# Patient Record
Sex: MALE | Race: WHITE | Employment: UNEMPLOYED | URBAN - NONMETROPOLITAN AREA
[De-identification: names, ages, dates, MRNs, and addresses within clinical notes are randomized per-mention and may not be internally consistent; named-entity substitution may affect disease eponyms.]

---

## 2017-02-10 ENCOUNTER — TELEPHONE (OUTPATIENT)
Dept: FAMILY MEDICINE CLINIC | Age: 47
End: 2017-02-10

## 2017-04-17 ENCOUNTER — TELEPHONE (OUTPATIENT)
Dept: FAMILY MEDICINE CLINIC | Age: 47
End: 2017-04-17

## 2017-04-18 ENCOUNTER — TELEPHONE (OUTPATIENT)
Dept: FAMILY MEDICINE CLINIC | Age: 47
End: 2017-04-18

## 2017-04-21 ENCOUNTER — TELEPHONE (OUTPATIENT)
Dept: FAMILY MEDICINE CLINIC | Age: 47
End: 2017-04-21

## 2017-04-24 ENCOUNTER — OFFICE VISIT (OUTPATIENT)
Dept: FAMILY MEDICINE CLINIC | Age: 47
End: 2017-04-24

## 2017-04-24 VITALS
SYSTOLIC BLOOD PRESSURE: 106 MMHG | HEART RATE: 105 BPM | DIASTOLIC BLOOD PRESSURE: 70 MMHG | OXYGEN SATURATION: 98 % | TEMPERATURE: 98.8 F

## 2017-04-24 DIAGNOSIS — K21.9 CHRONIC GERD: Primary | ICD-10-CM

## 2017-04-24 DIAGNOSIS — R60.9 DEPENDENT EDEMA: ICD-10-CM

## 2017-04-24 PROBLEM — G83.10 MONOPLEGIA OF LOWER EXTREMITY (HCC): Status: ACTIVE | Noted: 2017-02-04

## 2017-04-24 PROBLEM — N31.9 NEUROGENIC BLADDER: Status: ACTIVE | Noted: 2017-02-09

## 2017-04-24 PROBLEM — S27.1XXA HEMOTHORAX, TRAUMATIC: Status: ACTIVE | Noted: 2017-02-04

## 2017-04-24 PROBLEM — S42.109A CLOSED FRACTURE OF SCAPULA: Status: ACTIVE | Noted: 2017-02-04

## 2017-04-24 PROBLEM — S27.0XXA PNEUMOTHORAX, CLOSED, TRAUMATIC: Status: ACTIVE | Noted: 2017-02-04

## 2017-04-24 PROBLEM — S42.023A CLAVICULAR FRACTURE, CLOSED, SHAFT: Status: ACTIVE | Noted: 2017-02-04

## 2017-04-24 PROBLEM — S22.49XA CLOSED FRACTURE OF MULTIPLE RIBS: Status: ACTIVE | Noted: 2017-02-04

## 2017-04-24 PROBLEM — S24.101A: Status: ACTIVE | Noted: 2017-02-09
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
## 2017-04-24 PROBLEM — K59.2 NEUROGENIC BOWEL: Status: ACTIVE | Noted: 2017-02-09

## 2017-04-24 PROBLEM — I60.9 HEMORRHAGE INTO SUBARACHNOID SPACE OF NEURAXIS (HCC): Status: ACTIVE | Noted: 2017-03-08

## 2017-04-24 PROBLEM — S22.009A FRACTURE OF THORACIC SPINE (HCC): Status: ACTIVE | Noted: 2017-02-04

## 2017-04-24 LAB — D DIMER: 942 NG/ML DDU (ref 0–229)

## 2017-04-24 PROCEDURE — 99203 OFFICE O/P NEW LOW 30 MIN: CPT | Performed by: FAMILY MEDICINE

## 2017-04-24 PROCEDURE — 36415 COLL VENOUS BLD VENIPUNCTURE: CPT | Performed by: FAMILY MEDICINE

## 2017-04-24 RX ORDER — ESCITALOPRAM OXALATE 10 MG/1
10 TABLET ORAL DAILY
COMMUNITY
End: 2017-06-14 | Stop reason: SDUPTHER

## 2017-04-24 RX ORDER — PANTOPRAZOLE SODIUM 40 MG/1
40 TABLET, DELAYED RELEASE ORAL DAILY
Qty: 30 TABLET | Refills: 5 | Status: SHIPPED | OUTPATIENT
Start: 2017-04-24 | End: 2017-06-19

## 2017-04-24 RX ORDER — BISACODYL 10 MG
10 SUPPOSITORY, RECTAL RECTAL
COMMUNITY
Start: 2017-02-13 | End: 2017-07-17 | Stop reason: SDUPTHER

## 2017-04-24 RX ORDER — OXYCODONE HYDROCHLORIDE 5 MG/1
5-10 TABLET ORAL
COMMUNITY
Start: 2017-02-13 | End: 2018-06-26 | Stop reason: ALTCHOICE

## 2017-04-24 RX ORDER — POLYETHYLENE GLYCOL 3350 17 G/17G
17 POWDER, FOR SOLUTION ORAL
COMMUNITY
Start: 2017-02-13 | End: 2017-05-10 | Stop reason: ALTCHOICE

## 2017-04-24 ASSESSMENT — PATIENT HEALTH QUESTIONNAIRE - PHQ9
1. LITTLE INTEREST OR PLEASURE IN DOING THINGS: 1
2. FEELING DOWN, DEPRESSED OR HOPELESS: 1
SUM OF ALL RESPONSES TO PHQ9 QUESTIONS 1 & 2: 2
3. TROUBLE FALLING OR STAYING ASLEEP: 0
SUM OF ALL RESPONSES TO PHQ QUESTIONS 1-9: 2

## 2017-04-26 ENCOUNTER — TELEPHONE (OUTPATIENT)
Dept: FAMILY MEDICINE CLINIC | Age: 47
End: 2017-04-26

## 2017-04-27 ENCOUNTER — TELEPHONE (OUTPATIENT)
Dept: FAMILY MEDICINE CLINIC | Age: 47
End: 2017-04-27

## 2017-04-27 ENCOUNTER — HOSPITAL ENCOUNTER (OUTPATIENT)
Dept: ULTRASOUND IMAGING | Age: 47
Discharge: HOME | End: 2017-04-27
Attending: FAMILY MEDICINE | Admitting: FAMILY MEDICINE

## 2017-04-27 DIAGNOSIS — R60.9 DEPENDENT EDEMA: ICD-10-CM

## 2017-05-01 ENCOUNTER — TELEPHONE (OUTPATIENT)
Dept: FAMILY MEDICINE CLINIC | Age: 47
End: 2017-05-01

## 2017-05-10 ENCOUNTER — OFFICE VISIT (OUTPATIENT)
Dept: FAMILY MEDICINE CLINIC | Age: 47
End: 2017-05-10

## 2017-05-10 VITALS — HEART RATE: 122 BPM | SYSTOLIC BLOOD PRESSURE: 116 MMHG | OXYGEN SATURATION: 98 % | DIASTOLIC BLOOD PRESSURE: 68 MMHG

## 2017-05-10 DIAGNOSIS — I82.401 ACUTE DEEP VEIN THROMBOSIS (DVT) OF RIGHT LOWER EXTREMITY, UNSPECIFIED VEIN (HCC): Primary | ICD-10-CM

## 2017-05-10 PROCEDURE — 99213 OFFICE O/P EST LOW 20 MIN: CPT | Performed by: FAMILY MEDICINE

## 2017-05-10 RX ORDER — DIAZEPAM 2 MG/1
2 TABLET ORAL NIGHTLY PRN
COMMUNITY
End: 2017-06-14 | Stop reason: SDUPTHER

## 2017-05-10 RX ORDER — OXYBUTYNIN CHLORIDE 5 MG/1
5 TABLET ORAL 3 TIMES DAILY
COMMUNITY
End: 2017-06-19 | Stop reason: SDUPTHER

## 2017-05-10 RX ORDER — SENNA AND DOCUSATE SODIUM 50; 8.6 MG/1; MG/1
1 TABLET, FILM COATED ORAL DAILY
COMMUNITY
End: 2017-06-14 | Stop reason: SDUPTHER

## 2017-05-10 ASSESSMENT — ENCOUNTER SYMPTOMS
CHEST TIGHTNESS: 0
ANAL BLEEDING: 0
SHORTNESS OF BREATH: 0
BLOOD IN STOOL: 0

## 2017-05-16 ENCOUNTER — TELEPHONE (OUTPATIENT)
Dept: FAMILY MEDICINE CLINIC | Age: 47
End: 2017-05-16

## 2017-05-16 DIAGNOSIS — G82.20 PARAPLEGIA FOLLOWING SPINAL CORD INJURY (HCC): ICD-10-CM

## 2017-05-16 DIAGNOSIS — S24.101A T1-T6 LEVEL SPINAL CORD INJURY (HCC): Primary | ICD-10-CM

## 2017-05-17 ENCOUNTER — TELEPHONE (OUTPATIENT)
Dept: FAMILY MEDICINE CLINIC | Age: 47
End: 2017-05-17

## 2017-05-22 ENCOUNTER — TELEPHONE (OUTPATIENT)
Dept: FAMILY MEDICINE CLINIC | Age: 47
End: 2017-05-22

## 2017-06-05 ENCOUNTER — TELEPHONE (OUTPATIENT)
Dept: FAMILY MEDICINE CLINIC | Age: 47
End: 2017-06-05

## 2017-06-14 RX ORDER — ESCITALOPRAM OXALATE 10 MG/1
TABLET ORAL
Qty: 30 TABLET | Refills: 0 | Status: SHIPPED | OUTPATIENT
Start: 2017-06-14 | End: 2017-06-19 | Stop reason: SDUPTHER

## 2017-06-14 RX ORDER — DIAZEPAM 2 MG/1
TABLET ORAL
Qty: 30 TABLET | Refills: 0 | Status: SHIPPED | OUTPATIENT
Start: 2017-06-14 | End: 2017-06-19 | Stop reason: SDUPTHER

## 2017-06-14 RX ORDER — ASPIRIN 81 MG
TABLET, DELAYED RELEASE (ENTERIC COATED) ORAL
Qty: 30 TABLET | Refills: 0 | Status: SHIPPED | OUTPATIENT
Start: 2017-06-14 | End: 2017-06-19 | Stop reason: SDUPTHER

## 2017-06-14 RX ORDER — BACLOFEN 20 MG/1
TABLET ORAL
Qty: 30 TABLET | Refills: 0 | Status: SHIPPED | OUTPATIENT
Start: 2017-06-14 | End: 2017-06-19 | Stop reason: SDUPTHER

## 2017-06-15 ENCOUNTER — TELEPHONE (OUTPATIENT)
Dept: FAMILY MEDICINE CLINIC | Age: 47
End: 2017-06-15

## 2017-06-15 DIAGNOSIS — S24.101A SPINAL CORD INJURY, T1-T6 (HCC): Primary | ICD-10-CM

## 2017-06-19 ENCOUNTER — OFFICE VISIT (OUTPATIENT)
Dept: FAMILY MEDICINE CLINIC | Age: 47
End: 2017-06-19

## 2017-06-19 VITALS — HEART RATE: 90 BPM | DIASTOLIC BLOOD PRESSURE: 68 MMHG | OXYGEN SATURATION: 98 % | SYSTOLIC BLOOD PRESSURE: 138 MMHG

## 2017-06-19 DIAGNOSIS — S24.101A SPINAL CORD INJURY, T1-T6 (HCC): Primary | ICD-10-CM

## 2017-06-19 DIAGNOSIS — I82.401 ACUTE DEEP VEIN THROMBOSIS (DVT) OF RIGHT LOWER EXTREMITY, UNSPECIFIED VEIN (HCC): ICD-10-CM

## 2017-06-19 PROCEDURE — 99213 OFFICE O/P EST LOW 20 MIN: CPT | Performed by: FAMILY MEDICINE

## 2017-06-19 RX ORDER — ESCITALOPRAM OXALATE 10 MG/1
TABLET ORAL
Qty: 30 TABLET | Refills: 0 | Status: CANCELLED | OUTPATIENT
Start: 2017-06-19

## 2017-06-19 RX ORDER — BACLOFEN 10 MG/1
TABLET ORAL
Qty: 60 TABLET | Refills: 0 | Status: SHIPPED | OUTPATIENT
Start: 2017-06-19 | End: 2017-07-17 | Stop reason: SDUPTHER

## 2017-06-19 RX ORDER — BACLOFEN 20 MG/1
TABLET ORAL
Qty: 30 TABLET | Refills: 0 | Status: CANCELLED | OUTPATIENT
Start: 2017-06-19

## 2017-06-19 RX ORDER — ASPIRIN 81 MG
TABLET, DELAYED RELEASE (ENTERIC COATED) ORAL
Qty: 30 TABLET | Refills: 0 | Status: SHIPPED | OUTPATIENT
Start: 2017-06-19 | End: 2017-08-09 | Stop reason: SDUPTHER

## 2017-06-19 RX ORDER — OXYBUTYNIN CHLORIDE 5 MG/1
TABLET ORAL
Qty: 90 TABLET | Refills: 0 | Status: CANCELLED | OUTPATIENT
Start: 2017-06-19

## 2017-06-19 RX ORDER — DIAZEPAM 2 MG/1
TABLET ORAL
Qty: 30 TABLET | Refills: 5 | Status: SHIPPED | OUTPATIENT
Start: 2017-06-19 | End: 2017-12-19 | Stop reason: SDUPTHER

## 2017-06-19 RX ORDER — BACLOFEN 10 MG/1
10 TABLET ORAL 2 TIMES DAILY
COMMUNITY
End: 2017-09-18

## 2017-06-19 RX ORDER — ESCITALOPRAM OXALATE 10 MG/1
TABLET ORAL
Qty: 30 TABLET | Refills: 5 | Status: SHIPPED | OUTPATIENT
Start: 2017-06-19 | End: 2017-12-19 | Stop reason: SDUPTHER

## 2017-06-19 RX ORDER — BACLOFEN 20 MG/1
TABLET ORAL
Qty: 30 TABLET | Refills: 5 | Status: SHIPPED | OUTPATIENT
Start: 2017-06-19 | End: 2017-10-11 | Stop reason: SDUPTHER

## 2017-06-19 RX ORDER — OXYBUTYNIN CHLORIDE 5 MG/1
5 TABLET ORAL 3 TIMES DAILY
Qty: 90 TABLET | Refills: 5 | Status: SHIPPED | OUTPATIENT
Start: 2017-06-19 | End: 2017-11-24 | Stop reason: SDUPTHER

## 2017-06-19 ASSESSMENT — ENCOUNTER SYMPTOMS
RESPIRATORY NEGATIVE: 1
BLOOD IN STOOL: 0
ANAL BLEEDING: 0

## 2017-08-03 ENCOUNTER — TELEPHONE (OUTPATIENT)
Dept: FAMILY MEDICINE CLINIC | Age: 47
End: 2017-08-03

## 2017-08-03 DIAGNOSIS — N31.9 NEUROGENIC BLADDER: ICD-10-CM

## 2017-08-03 DIAGNOSIS — G82.20 PARALYSIS OF BOTH LOWER LIMBS (HCC): ICD-10-CM

## 2017-08-03 DIAGNOSIS — S24.101A T1-T6 LEVEL SPINAL CORD INJURY (HCC): Primary | ICD-10-CM

## 2017-08-03 DIAGNOSIS — K59.2 NEUROGENIC BOWEL: ICD-10-CM

## 2017-08-04 ENCOUNTER — TELEPHONE (OUTPATIENT)
Dept: FAMILY MEDICINE CLINIC | Age: 47
End: 2017-08-04

## 2017-08-04 RX ORDER — POLYETHYLENE GLYCOL 3350 17 G/17G
17 POWDER, FOR SOLUTION ORAL DAILY
Qty: 510 G | Refills: 0 | Status: SHIPPED | OUTPATIENT
Start: 2017-08-04 | End: 2017-08-31 | Stop reason: SDUPTHER

## 2017-08-04 RX ORDER — DIAPER,BRIEF,ADULT, DISPOSABLE
1 EACH MISCELLANEOUS SEE ADMIN INSTRUCTIONS
Qty: 100 EACH | Refills: 11 | Status: SHIPPED | OUTPATIENT
Start: 2017-08-04

## 2017-08-09 RX ORDER — DOCUSATE SODIUM AND SENNOSIDES 8.6; 5 MG/1; MG/1
TABLET, FILM COATED ORAL
Qty: 30 TABLET | Refills: 0 | Status: SHIPPED | OUTPATIENT
Start: 2017-08-09 | End: 2017-08-31 | Stop reason: SDUPTHER

## 2017-08-11 ENCOUNTER — OFFICE VISIT (OUTPATIENT)
Dept: FAMILY MEDICINE CLINIC | Age: 47
End: 2017-08-11

## 2017-08-11 VITALS
TEMPERATURE: 98.5 F | HEART RATE: 93 BPM | DIASTOLIC BLOOD PRESSURE: 76 MMHG | SYSTOLIC BLOOD PRESSURE: 124 MMHG | OXYGEN SATURATION: 98 %

## 2017-08-11 DIAGNOSIS — S24.101A T1-T6 LEVEL SPINAL CORD INJURY (HCC): Primary | ICD-10-CM

## 2017-08-11 DIAGNOSIS — I82.401 ACUTE DEEP VEIN THROMBOSIS (DVT) OF RIGHT LOWER EXTREMITY, UNSPECIFIED VEIN (HCC): ICD-10-CM

## 2017-08-11 PROCEDURE — 99213 OFFICE O/P EST LOW 20 MIN: CPT | Performed by: FAMILY MEDICINE

## 2017-08-11 ASSESSMENT — ENCOUNTER SYMPTOMS
CONSTIPATION: 1
RESPIRATORY NEGATIVE: 1
ANAL BLEEDING: 0
BLOOD IN STOOL: 0

## 2017-08-16 ENCOUNTER — TELEPHONE (OUTPATIENT)
Dept: FAMILY MEDICINE CLINIC | Age: 47
End: 2017-08-16

## 2017-08-21 ENCOUNTER — HOSPITAL ENCOUNTER (OUTPATIENT)
Dept: ULTRASOUND IMAGING | Age: 47
Discharge: HOME | End: 2017-08-21

## 2017-08-21 DIAGNOSIS — N31.9 NEUROMUSCULAR DYSFUNCTION OF BLADDER: ICD-10-CM

## 2017-08-21 DIAGNOSIS — N31.9 NEUROGENIC BLADDER, NOS: ICD-10-CM

## 2017-08-23 ENCOUNTER — TELEPHONE (OUTPATIENT)
Dept: FAMILY MEDICINE CLINIC | Age: 47
End: 2017-08-23

## 2017-08-31 ENCOUNTER — TELEPHONE (OUTPATIENT)
Dept: FAMILY MEDICINE CLINIC | Age: 47
End: 2017-08-31

## 2017-09-01 RX ORDER — POLYETHYLENE GLYCOL 3350 17 G/17G
POWDER, FOR SOLUTION ORAL
Qty: 527 G | Refills: 5 | Status: SHIPPED | OUTPATIENT
Start: 2017-09-01 | End: 2018-03-20 | Stop reason: SDUPTHER

## 2017-09-01 RX ORDER — DOCUSATE SODIUM AND SENNOSIDES 8.6; 5 MG/1; MG/1
TABLET, FILM COATED ORAL
Qty: 30 TABLET | Refills: 5 | Status: SHIPPED | OUTPATIENT
Start: 2017-09-01 | End: 2018-01-26 | Stop reason: SDUPTHER

## 2017-09-20 ENCOUNTER — TELEPHONE (OUTPATIENT)
Dept: FAMILY MEDICINE CLINIC | Age: 47
End: 2017-09-20

## 2017-09-20 ENCOUNTER — TELEPHONE (OUTPATIENT)
Dept: PULMONOLOGY | Age: 47
End: 2017-09-20

## 2017-09-20 DIAGNOSIS — R93.89 ABNORMAL CT SCAN: Primary | ICD-10-CM

## 2017-09-21 ENCOUNTER — TELEPHONE (OUTPATIENT)
Dept: PULMONOLOGY | Age: 47
End: 2017-09-21

## 2017-09-25 ENCOUNTER — TELEPHONE (OUTPATIENT)
Dept: FAMILY MEDICINE CLINIC | Age: 47
End: 2017-09-25

## 2017-09-28 ENCOUNTER — TELEPHONE (OUTPATIENT)
Dept: FAMILY MEDICINE CLINIC | Age: 47
End: 2017-09-28

## 2017-09-28 NOTE — TELEPHONE ENCOUNTER
Father of patient says that he contacted the insurance company regarding physical therapy.  They told him that Dr. Maria Isabel Martinez would need to order the physical therapy

## 2017-09-29 NOTE — TELEPHONE ENCOUNTER
I got the form from online. Will complete it and fax it over along with additional information to support it.

## 2017-10-03 NOTE — TELEPHONE ENCOUNTER
Checked fax and nothing has come through yet from [employer].

## 2017-10-09 ENCOUNTER — TELEPHONE (OUTPATIENT)
Dept: FAMILY MEDICINE CLINIC | Age: 47
End: 2017-10-09

## 2017-10-09 DIAGNOSIS — S22.008D OTHER CLOSED FRACTURE OF THORACIC VERTEBRA WITH ROUTINE HEALING, UNSPECIFIED THORACIC VERTEBRAL LEVEL, SUBSEQUENT ENCOUNTER: ICD-10-CM

## 2017-10-09 DIAGNOSIS — S27.1XXA: ICD-10-CM

## 2017-10-09 DIAGNOSIS — S24.101A T1-T6 LEVEL SPINAL CORD INJURY (HCC): Primary | ICD-10-CM

## 2017-10-09 DIAGNOSIS — S27.0XXA: ICD-10-CM

## 2017-10-09 NOTE — TELEPHONE ENCOUNTER
Noted - placed order for continuous PT Aquatic Therapy  And faxed to Violet Lockwood at [address]

## 2017-10-11 ENCOUNTER — OFFICE VISIT (OUTPATIENT)
Dept: FAMILY MEDICINE CLINIC | Age: 47
End: 2017-10-11

## 2017-10-11 VITALS
TEMPERATURE: 99.1 F | DIASTOLIC BLOOD PRESSURE: 82 MMHG | SYSTOLIC BLOOD PRESSURE: 112 MMHG | HEART RATE: 88 BPM | RESPIRATION RATE: 20 BRPM | OXYGEN SATURATION: 97 %

## 2017-10-11 DIAGNOSIS — Z86.718 HX OF BLOOD CLOTS: ICD-10-CM

## 2017-10-11 DIAGNOSIS — I26.99 OTHER PULMONARY EMBOLISM WITHOUT ACUTE COR PULMONALE (HCC): Primary | ICD-10-CM

## 2017-10-11 PROCEDURE — 99213 OFFICE O/P EST LOW 20 MIN: CPT | Performed by: FAMILY MEDICINE

## 2017-10-11 ASSESSMENT — ENCOUNTER SYMPTOMS: RESPIRATORY NEGATIVE: 1

## 2017-10-11 NOTE — PATIENT INSTRUCTIONS
Watch for bleeding complicationdiscussed in detail    Call this office after CT of chest done in 3 months. Further follow-up with Dr. Bryn Mejia not necessary. Certainly at your discretion if so desired.

## 2017-10-11 NOTE — PROGRESS NOTES
Subjective:      Patient ID: Errol Cee is a 52 y.o. male. HPI  Chief Complaint   Patient presents with    Follow-Up from Hospital-'s not fit criteria for transition care visit. Reasonno phone call from nurse coordinator. Patient admitted with acute pulmonary embolus bilateral nodular infiltrate in the lung which requires follow-up CT in 3 months. Patient instructed that just call us after he gets the CT. This examiner sees no reason for continued pulmonary consultation. Discussed need for anticoagulation for the duration. That he can still get blood clots. And to look for bleedingmelena, hematochezia or epistaxis ecchymoses hematuria. Patientthis could be fatal and we have no reversible drug. [facility] admitted 9/18/17 for blood clot     Other     Pt rolled back in wheel chair in waiting room today      Chief complaint and present illness: 26-year-old white male paraplegic presents in follow-up to pulmonary emboli 9 2017. Chart from that hospitalization reviewed in detail. Scans reviewed in detail. As noted above. Review of Systems   Respiratory: Negative. Cardiovascular: Negative. Neurological: Negative. Hematological: Negative. Objective:   Physical Exam   Constitutional: He is oriented to person, place, and time. He appears well-developed and well-nourished. No distress. Cardiovascular: Normal rate and regular rhythm. Pulmonary/Chest: Effort normal and breath sounds normal.   Musculoskeletal: He exhibits no edema or tenderness. Neurological: He is alert and oriented to person, place, and time. Skin: Skin is warm. No bruising and no ecchymosis noted. No pallor. Nursing note and vitals reviewed. /82 (Site: Left Arm, Position: Sitting, Cuff Size: Large Adult)   Pulse 88   Temp 99.1 °F (37.3 °C) (Oral)   Resp 20   SpO2 97%     Assessment:      Yvon Gupta was seen today for follow-up from hospital and other.     Diagnoses and all orders for

## 2017-10-31 DIAGNOSIS — I82.401 ACUTE DEEP VEIN THROMBOSIS (DVT) OF RIGHT LOWER EXTREMITY, UNSPECIFIED VEIN (HCC): ICD-10-CM

## 2017-10-31 RX ORDER — RIVAROXABAN 20 MG/1
20 TABLET, FILM COATED ORAL DAILY
Qty: 30 TABLET | Refills: 3 | Status: SHIPPED | OUTPATIENT
Start: 2017-10-31 | End: 2018-01-26 | Stop reason: SDUPTHER

## 2017-11-20 NOTE — TELEPHONE ENCOUNTER
Patients father is calling for more  Catheters KIT,  Needing more. Ne[facility] is it okay to redorder?

## 2017-12-26 ENCOUNTER — TELEPHONE (OUTPATIENT)
Dept: FAMILY MEDICINE CLINIC | Age: 47
End: 2017-12-26

## 2017-12-26 DIAGNOSIS — R91.1 PULMONARY NODULE SEEN ON IMAGING STUDY: Primary | ICD-10-CM

## 2017-12-26 DIAGNOSIS — S24.101A SPINAL CORD INJURY, T1-T6 (HCC): ICD-10-CM

## 2017-12-26 RX ORDER — BACLOFEN 20 MG/1
TABLET ORAL
Qty: 30 TABLET | Refills: 5 | Status: SHIPPED | OUTPATIENT
Start: 2017-12-26 | End: 2018-05-14 | Stop reason: SDUPTHER

## 2017-12-26 NOTE — TELEPHONE ENCOUNTER
I set the ct scan up for patient because his father was having difficulty with the scheduling dept  It is scheduled for Jan 2 for 11 am he needs to [facility] 10:30 [facility] the [facility]

## 2017-12-28 ENCOUNTER — TELEPHONE (OUTPATIENT)
Dept: PULMONOLOGY | Age: 47
End: 2017-12-28

## 2017-12-28 NOTE — TELEPHONE ENCOUNTER
Patient cancelled appointment on 1/2/18 with Dr. Neo Irving for CT/hospital f/u. Reason: pt insurance out of network, so pt is going to have CT chest and f/u with Dr. Cyn Calles. Patient did not reschedule appointment. Appointment rescheduled for .      Hospital consult 9/20/17:    ASSESSMENT:  · Acute hypoxemic respiratory failure   · Acute pulmonary emboli  · Urinary Tract Infection   · Elevated troponin, favor secondary to acute RV overload     PLAN:  · Agree with lovenox SQ.  I now recommend transitioning to Xarelto, as patient previously did well with Lilli Copas lifelong anticoagulation per ACCP guidelines  · CTX D#3  · Supplemental oxygen  · Repeat CT CHEST in 3 months for abnormal CT CHEST, nodular infiltrate  · D/C planning

## 2018-01-02 ENCOUNTER — HOSPITAL ENCOUNTER (OUTPATIENT)
Dept: CT IMAGING | Facility: MEDICAL CENTER | Age: 48
Discharge: HOME | End: 2018-01-02
Attending: FAMILY MEDICINE | Admitting: FAMILY MEDICINE

## 2018-01-02 DIAGNOSIS — R91.1 SOLITARY PULMONARY NODULE: ICD-10-CM

## 2018-01-02 DIAGNOSIS — R91.1 PULMONARY NODULE SEEN ON IMAGING STUDY: ICD-10-CM

## 2018-01-17 ENCOUNTER — OFFICE VISIT (OUTPATIENT)
Dept: FAMILY MEDICINE CLINIC | Age: 48
End: 2018-01-17

## 2018-01-17 VITALS — SYSTOLIC BLOOD PRESSURE: 131 MMHG | DIASTOLIC BLOOD PRESSURE: 90 MMHG | HEART RATE: 102 BPM | OXYGEN SATURATION: 98 %

## 2018-01-17 DIAGNOSIS — Z79.01 ANTICOAGULANT LONG-TERM USE: Primary | ICD-10-CM

## 2018-01-17 DIAGNOSIS — S24.101A T1-T6 LEVEL SPINAL CORD INJURY (HCC): ICD-10-CM

## 2018-01-17 DIAGNOSIS — F32.A DEPRESSION, UNSPECIFIED DEPRESSION TYPE: ICD-10-CM

## 2018-01-17 DIAGNOSIS — I26.99 OTHER ACUTE PULMONARY EMBOLISM WITHOUT ACUTE COR PULMONALE (HCC): ICD-10-CM

## 2018-01-17 PROCEDURE — 1036F TOBACCO NON-USER: CPT | Performed by: FAMILY MEDICINE

## 2018-01-17 PROCEDURE — G8427 DOCREV CUR MEDS BY ELIG CLIN: HCPCS | Performed by: FAMILY MEDICINE

## 2018-01-17 PROCEDURE — G8484 FLU IMMUNIZE NO ADMIN: HCPCS | Performed by: FAMILY MEDICINE

## 2018-01-17 PROCEDURE — G8417 CALC BMI ABV UP PARAM F/U: HCPCS | Performed by: FAMILY MEDICINE

## 2018-01-17 PROCEDURE — 99214 OFFICE O/P EST MOD 30 MIN: CPT | Performed by: FAMILY MEDICINE

## 2018-01-24 DIAGNOSIS — S24.101A SPINAL CORD INJURY, T1-T6 (HCC): ICD-10-CM

## 2018-01-24 RX ORDER — DIAZEPAM 2 MG/1
TABLET ORAL
Qty: 30 TABLET | Refills: 5 | Status: SHIPPED | OUTPATIENT
Start: 2018-01-24 | End: 2018-07-09 | Stop reason: SDUPTHER

## 2018-01-24 RX ORDER — ESCITALOPRAM OXALATE 10 MG/1
TABLET ORAL
Qty: 30 TABLET | Refills: 5 | Status: SHIPPED | OUTPATIENT
Start: 2018-01-24 | End: 2018-05-14 | Stop reason: SDUPTHER

## 2018-01-24 NOTE — TELEPHONE ENCOUNTER
Refill Request for Lexapro and Valium     Last visit- 1/17/18    Told to follow up- 4 months    Pending appointment- 5/14/18    Additional Comments - OARRS RAN 1/4/18

## 2018-01-26 ENCOUNTER — TELEPHONE (OUTPATIENT)
Dept: FAMILY MEDICINE CLINIC | Age: 48
End: 2018-01-26

## 2018-01-26 DIAGNOSIS — I82.401 ACUTE DEEP VEIN THROMBOSIS (DVT) OF RIGHT LOWER EXTREMITY, UNSPECIFIED VEIN (HCC): ICD-10-CM

## 2018-01-26 NOTE — TELEPHONE ENCOUNTER
Refill Request for Xarelto, dok plus     Last visit- 1/17/18    Told to follow up- 4 months    Pending appointment- 5/14/18

## 2018-01-27 RX ORDER — DOCUSATE SODIUM AND SENNOSIDES 8.6; 5 MG/1; MG/1
TABLET, FILM COATED ORAL
Qty: 30 TABLET | Refills: 0 | Status: SHIPPED | OUTPATIENT
Start: 2018-01-27 | End: 2018-03-20 | Stop reason: SDUPTHER

## 2018-01-27 RX ORDER — RIVAROXABAN 20 MG/1
20 TABLET, FILM COATED ORAL DAILY
Qty: 30 TABLET | Refills: 0 | Status: SHIPPED | OUTPATIENT
Start: 2018-01-27 | End: 2018-03-20 | Stop reason: SDUPTHER

## 2018-02-22 ENCOUNTER — TELEPHONE (OUTPATIENT)
Dept: FAMILY MEDICINE CLINIC | Age: 48
End: 2018-02-22

## 2018-03-20 DIAGNOSIS — I82.401 ACUTE DEEP VEIN THROMBOSIS (DVT) OF RIGHT LOWER EXTREMITY, UNSPECIFIED VEIN (HCC): ICD-10-CM

## 2018-03-20 RX ORDER — DOCUSATE SODIUM AND SENNOSIDES 8.6; 5 MG/1; MG/1
TABLET, FILM COATED ORAL
Qty: 30 TABLET | Refills: 0 | Status: SHIPPED | OUTPATIENT
Start: 2018-03-20 | End: 2018-05-08 | Stop reason: SDUPTHER

## 2018-03-20 RX ORDER — RIVAROXABAN 20 MG/1
20 TABLET, FILM COATED ORAL DAILY
Qty: 30 TABLET | Refills: 0 | Status: SHIPPED | OUTPATIENT
Start: 2018-03-20 | End: 2018-04-20 | Stop reason: SDUPTHER

## 2018-03-20 RX ORDER — POLYETHYLENE GLYCOL 3350 17 G/17G
POWDER, FOR SOLUTION ORAL
Qty: 527 G | Refills: 0 | Status: SHIPPED | OUTPATIENT
Start: 2018-03-20 | End: 2021-12-16

## 2018-03-21 ENCOUNTER — TELEPHONE (OUTPATIENT)
Dept: FAMILY MEDICINE CLINIC | Age: 48
End: 2018-03-21

## 2018-03-21 DIAGNOSIS — S24.102S T5 SPINAL CORD INJURY, SEQUELA (HCC): ICD-10-CM

## 2018-03-21 DIAGNOSIS — G82.20 PARALYSIS OF BOTH LOWER LIMBS (HCC): Primary | ICD-10-CM

## 2018-03-21 DIAGNOSIS — K59.2 NEUROGENIC BOWEL: ICD-10-CM

## 2018-03-21 DIAGNOSIS — N31.9 NEUROGENIC BLADDER: ICD-10-CM

## 2018-03-21 PROBLEM — S22.059A T6 VERTEBRAL FRACTURE (HCC): Status: ACTIVE | Noted: 2017-02-04

## 2018-03-21 PROBLEM — S24.102A: Status: ACTIVE | Noted: 2017-02-09

## 2018-03-29 RX ORDER — CATHETER 12 FR
EACH MISCELLANEOUS
Qty: 1 EACH | Refills: 12 | Status: SHIPPED | OUTPATIENT
Start: 2018-03-29

## 2018-03-29 NOTE — TELEPHONE ENCOUNTER
Pt father calling needs an RX for lubrication for catheters. [employer] delivers the equipment. They deliver tomorrow, informed that Dr. Yohannes Jacob is out of office the rest of today and tomorrow.

## 2018-04-20 DIAGNOSIS — I82.401 ACUTE DEEP VEIN THROMBOSIS (DVT) OF RIGHT LOWER EXTREMITY, UNSPECIFIED VEIN (HCC): ICD-10-CM

## 2018-04-20 RX ORDER — RIVAROXABAN 20 MG/1
20 TABLET, FILM COATED ORAL DAILY
Qty: 30 TABLET | Refills: 1 | Status: SHIPPED | OUTPATIENT
Start: 2018-04-20 | End: 2018-05-14 | Stop reason: SDUPTHER

## 2018-05-08 RX ORDER — AMOXICILLIN 250 MG
CAPSULE ORAL
Qty: 30 TABLET | Refills: 1 | Status: SHIPPED | OUTPATIENT
Start: 2018-05-08 | End: 2018-06-06 | Stop reason: SDUPTHER

## 2018-05-14 ENCOUNTER — OFFICE VISIT (OUTPATIENT)
Dept: FAMILY MEDICINE CLINIC | Age: 48
End: 2018-05-14

## 2018-05-14 VITALS — HEART RATE: 92 BPM | OXYGEN SATURATION: 94 % | DIASTOLIC BLOOD PRESSURE: 74 MMHG | SYSTOLIC BLOOD PRESSURE: 115 MMHG

## 2018-05-14 DIAGNOSIS — S24.101A SPINAL CORD INJURY, T1-T6 (HCC): ICD-10-CM

## 2018-05-14 DIAGNOSIS — I82.401 ACUTE DEEP VEIN THROMBOSIS (DVT) OF RIGHT LOWER EXTREMITY, UNSPECIFIED VEIN (HCC): ICD-10-CM

## 2018-05-14 PROCEDURE — 99213 OFFICE O/P EST LOW 20 MIN: CPT | Performed by: FAMILY MEDICINE

## 2018-05-14 PROCEDURE — G8427 DOCREV CUR MEDS BY ELIG CLIN: HCPCS | Performed by: FAMILY MEDICINE

## 2018-05-14 PROCEDURE — 1036F TOBACCO NON-USER: CPT | Performed by: FAMILY MEDICINE

## 2018-05-14 PROCEDURE — G8417 CALC BMI ABV UP PARAM F/U: HCPCS | Performed by: FAMILY MEDICINE

## 2018-05-14 RX ORDER — DIAZEPAM 2 MG/1
2 TABLET ORAL NIGHTLY
COMMUNITY
Start: 2018-04-18 | End: 2019-01-04 | Stop reason: SDUPTHER

## 2018-05-14 RX ORDER — BACLOFEN 10 MG/1
TABLET ORAL
Qty: 60 TABLET | Refills: 11 | Status: SHIPPED | OUTPATIENT
Start: 2018-05-14 | End: 2018-11-05 | Stop reason: SDUPTHER

## 2018-05-14 RX ORDER — ESCITALOPRAM OXALATE 10 MG/1
TABLET ORAL
Qty: 30 TABLET | Refills: 5 | Status: SHIPPED | OUTPATIENT
Start: 2018-05-14 | End: 2018-11-05 | Stop reason: SDUPTHER

## 2018-05-14 RX ORDER — BACLOFEN 20 MG/1
TABLET ORAL
Qty: 30 TABLET | Refills: 11 | Status: SHIPPED | OUTPATIENT
Start: 2018-05-14 | End: 2018-11-05 | Stop reason: SDUPTHER

## 2018-05-14 ASSESSMENT — PATIENT HEALTH QUESTIONNAIRE - PHQ9
SUM OF ALL RESPONSES TO PHQ9 QUESTIONS 1 & 2: 1
2. FEELING DOWN, DEPRESSED OR HOPELESS: 1
1. LITTLE INTEREST OR PLEASURE IN DOING THINGS: 0
SUM OF ALL RESPONSES TO PHQ QUESTIONS 1-9: 1

## 2018-05-14 ASSESSMENT — ENCOUNTER SYMPTOMS: RESPIRATORY NEGATIVE: 1

## 2018-06-06 RX ORDER — AMOXICILLIN 250 MG
CAPSULE ORAL
Qty: 30 TABLET | Refills: 1 | Status: SHIPPED | OUTPATIENT
Start: 2018-06-06 | End: 2018-08-08 | Stop reason: SDUPTHER

## 2018-06-25 ENCOUNTER — TELEPHONE (OUTPATIENT)
Dept: FAMILY MEDICINE CLINIC | Age: 48
End: 2018-06-25

## 2018-06-25 DIAGNOSIS — T14.8XXA WOUND, OPEN: Primary | ICD-10-CM

## 2018-06-26 ENCOUNTER — HOSPITAL ENCOUNTER (OUTPATIENT)
Dept: WOUND CARE | Age: 48
Discharge: HOME | End: 2018-06-27
Attending: NURSE PRACTITIONER | Admitting: NURSE PRACTITIONER

## 2018-06-26 VITALS
DIASTOLIC BLOOD PRESSURE: 69 MMHG | TEMPERATURE: 98.5 F | WEIGHT: 243.9 LBS | RESPIRATION RATE: 20 BRPM | BODY MASS INDEX: 32.32 KG/M2 | SYSTOLIC BLOOD PRESSURE: 117 MMHG | HEIGHT: 73 IN | HEART RATE: 74 BPM

## 2018-06-26 DIAGNOSIS — L89.322 PRESSURE ULCER OF LEFT ISCHIUM, STAGE 2 (HCC): ICD-10-CM

## 2018-06-26 DIAGNOSIS — G82.20 PARAPLEGIA (HCC): ICD-10-CM

## 2018-06-26 RX ORDER — LIDOCAINE 40 MG/G
CREAM TOPICAL ONCE
Status: DISCONTINUED | OUTPATIENT
Start: 2018-06-26 | End: 2018-06-28 | Stop reason: HOSPADM

## 2018-06-26 RX ORDER — NYSTATIN 100000 U/G
CREAM TOPICAL
Qty: 30 G | Refills: 1 | Status: SHIPPED | OUTPATIENT
Start: 2018-06-26 | End: 2018-09-27 | Stop reason: ALTCHOICE

## 2018-06-27 PROBLEM — L89.322 PRESSURE ULCER OF LEFT ISCHIUM, STAGE 2 (HCC): Status: ACTIVE | Noted: 2018-06-27

## 2018-06-27 PROBLEM — G82.20 PARAPLEGIA (HCC): Status: ACTIVE | Noted: 2018-06-27

## 2018-06-27 PROCEDURE — 99202 OFFICE O/P NEW SF 15 MIN: CPT | Performed by: NURSE PRACTITIONER

## 2018-06-27 PROCEDURE — 97597 DBRDMT OPN WND 1ST 20 CM/<: CPT | Performed by: NURSE PRACTITIONER

## 2018-07-03 ENCOUNTER — HOSPITAL ENCOUNTER (OUTPATIENT)
Dept: WOUND CARE | Age: 48
Discharge: HOME | End: 2018-07-03
Attending: NURSE PRACTITIONER | Admitting: NURSE PRACTITIONER

## 2018-07-03 VITALS — TEMPERATURE: 98.4 F | DIASTOLIC BLOOD PRESSURE: 74 MMHG | RESPIRATION RATE: 20 BRPM | SYSTOLIC BLOOD PRESSURE: 136 MMHG

## 2018-07-03 DIAGNOSIS — G82.20 PARAPLEGIA (HCC): ICD-10-CM

## 2018-07-03 DIAGNOSIS — L89.322 PRESSURE ULCER OF LEFT ISCHIUM, STAGE 2 (HCC): ICD-10-CM

## 2018-07-03 PROCEDURE — 97597 DBRDMT OPN WND 1ST 20 CM/<: CPT | Performed by: NURSE PRACTITIONER

## 2018-07-03 RX ORDER — LIDOCAINE 40 MG/G
CREAM TOPICAL ONCE
Status: DISCONTINUED | OUTPATIENT
Start: 2018-07-03 | End: 2018-07-04 | Stop reason: HOSPADM

## 2018-07-09 DIAGNOSIS — S24.101A SPINAL CORD INJURY, T1-T6 (HCC): ICD-10-CM

## 2018-07-09 RX ORDER — DIAZEPAM 2 MG/1
TABLET ORAL
Qty: 30 TABLET | Refills: 5 | Status: SHIPPED | OUTPATIENT
Start: 2018-07-09 | End: 2018-08-08

## 2018-07-10 ENCOUNTER — HOSPITAL ENCOUNTER (OUTPATIENT)
Dept: WOUND CARE | Age: 48
Discharge: HOME | End: 2018-07-10
Attending: NURSE PRACTITIONER | Admitting: NURSE PRACTITIONER

## 2018-07-10 VITALS
HEART RATE: 97 BPM | WEIGHT: 244 LBS | RESPIRATION RATE: 20 BRPM | BODY MASS INDEX: 32.19 KG/M2 | TEMPERATURE: 98.3 F | SYSTOLIC BLOOD PRESSURE: 148 MMHG | DIASTOLIC BLOOD PRESSURE: 86 MMHG

## 2018-07-10 DIAGNOSIS — L89.322 PRESSURE ULCER OF LEFT ISCHIUM, STAGE 2 (HCC): ICD-10-CM

## 2018-07-10 DIAGNOSIS — G82.20 PARAPLEGIA (HCC): ICD-10-CM

## 2018-07-10 PROCEDURE — 99213 OFFICE O/P EST LOW 20 MIN: CPT | Performed by: NURSE PRACTITIONER

## 2018-07-11 NOTE — PROGRESS NOTES
[facility] Progress Note    Jeff Saldana     : 1970    DATE OF VISIT:  7/10/2018    Subjective:     Jeff Saldana is a 50 y.o. male who has a pressure ulcer to left ischium  Wound has resolved with strict offloading. He spends most of day in customized wheelchair he was given by a friend. Pt will need seat cushion for this chair and regularly offload q 1 -2 hrs. Has been educated. Pt denies fever, chills, malaise. Appetite good. Current complaint of pain in this ulcer? No.Pt can feel pressure of palpation only    His current medication list consists of BARDEX LUBRICATH CATHETER, Catheters, DEPEND REAL-FIT BRIEFS FOR MEN, Disposable Gloves, Disposable Liners, baclofen, bisacodyl, diazepam, escitalopram, nystatin, oxybutynin, polyethylene glycol, rivaroxaban, and senna-docusate. Allergies: Patient has no known allergies. Objective:     Vitals:    07/10/18 1447   BP: (!) 148/86   Pulse: 97   Resp: 20   Temp: 98.3 °F (36.8 °C)   TempSrc: Oral   Weight: 244 lb (110.7 kg)       General Appearance: alert and oriented to person, place and time, well developed and well-nourished, in no acute distress  Extremities: no cyanosis, cellulitis, no LE edema  Musculoskeletal: Paraplegia, minimal atrophy  Neurologic: no distal sensation     Wound to Right Ischium resolved  Photos also saved in electronic chart. Assessment:     Patient Active Problem List   Diagnosis Code    Hemothorax, traumatic S27. 1XXA    T6 vertebral fracture ([address] S22.059A    T5 spinal cord injury ([address] S24.102A    SAH [facility]arachnoid hemorrhage) ([facility]) I60.9    Pneumothorax, closed, traumatic S27. 0XXA    Paralysis of lower limb ([address] G83.10    Clavicular fracture, closed, shaft S42.023A    Closed fracture of scapula S42.109A    Fall W19. XXXA    Closed fracture of multiple ribs S22.49XA    Neurogenic bladder N31.9    Neurogenic bowel K59.2    Hx of blood clots Z86.718    Acute hypoxemic

## 2018-07-26 DIAGNOSIS — S24.101A SPINAL CORD INJURY, T1-T6 (HCC): ICD-10-CM

## 2018-07-26 RX ORDER — ESCITALOPRAM OXALATE 10 MG/1
TABLET ORAL
Qty: 30 TABLET | Refills: 0 | OUTPATIENT
Start: 2018-07-26

## 2018-07-26 NOTE — TELEPHONE ENCOUNTER
I spoke with Tracie at the Pharmacy and he said they did not receive the refill in May , so I gave him that refill on #30 with 5 refills

## 2018-08-08 RX ORDER — AMOXICILLIN 250 MG
CAPSULE ORAL
Qty: 30 TABLET | Refills: 1 | Status: SHIPPED | OUTPATIENT
Start: 2018-08-08 | End: 2018-10-09 | Stop reason: SDUPTHER
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
## 2018-09-27 ENCOUNTER — HOSPITAL ENCOUNTER (OUTPATIENT)
Dept: WOUND CARE | Age: 48
Discharge: HOME | End: 2018-09-27
Payer: COMMERCIAL

## 2018-09-27 VITALS
SYSTOLIC BLOOD PRESSURE: 143 MMHG | BODY MASS INDEX: 31.74 KG/M2 | WEIGHT: 239.5 LBS | DIASTOLIC BLOOD PRESSURE: 82 MMHG | HEART RATE: 88 BPM | TEMPERATURE: 98.5 F | HEIGHT: 73 IN | RESPIRATION RATE: 16 BRPM

## 2018-09-27 DIAGNOSIS — L89.322 STAGE II PRESSURE ULCER OF LEFT BUTTOCK (HCC): ICD-10-CM

## 2018-09-27 PROCEDURE — 99212 OFFICE O/P EST SF 10 MIN: CPT | Performed by: NURSE PRACTITIONER

## 2018-09-27 PROCEDURE — 99213 OFFICE O/P EST LOW 20 MIN: CPT

## 2018-09-27 RX ORDER — LIDOCAINE 40 MG/G
CREAM TOPICAL ONCE
Status: DISCONTINUED | OUTPATIENT
Start: 2018-09-27 | End: 2018-09-28 | Stop reason: HOSPADM

## 2018-09-27 NOTE — PLAN OF CARE
Problem: Pressure Ulcer:  Intervention: Infection risk assessment  No wound debridement today, continue offloading of wound, f/u x 1 week,MD orders/D/C instructions reviewed with patient, all questions answered; copy of instructions given to patient

## 2018-10-04 ENCOUNTER — HOSPITAL ENCOUNTER (OUTPATIENT)
Dept: WOUND CARE | Age: 48
Discharge: HOME | End: 2018-10-04
Payer: COMMERCIAL

## 2018-10-04 VITALS
TEMPERATURE: 98.5 F | SYSTOLIC BLOOD PRESSURE: 127 MMHG | HEART RATE: 71 BPM | DIASTOLIC BLOOD PRESSURE: 70 MMHG | HEIGHT: 73 IN | WEIGHT: 244.4 LBS | BODY MASS INDEX: 32.39 KG/M2 | RESPIRATION RATE: 16 BRPM

## 2018-10-04 DIAGNOSIS — L89.322 STAGE II PRESSURE ULCER OF LEFT BUTTOCK (HCC): ICD-10-CM

## 2018-10-04 PROCEDURE — 99213 OFFICE O/P EST LOW 20 MIN: CPT | Performed by: NURSE PRACTITIONER

## 2018-10-04 PROCEDURE — 99213 OFFICE O/P EST LOW 20 MIN: CPT

## 2018-10-04 RX ORDER — LIDOCAINE 40 MG/G
CREAM TOPICAL ONCE
Status: DISCONTINUED | OUTPATIENT
Start: 2018-10-04 | End: 2018-10-05 | Stop reason: HOSPADM

## 2018-10-09 DIAGNOSIS — S24.101A SPINAL CORD INJURY, T1-T6 (HCC): ICD-10-CM

## 2018-10-09 RX ORDER — OXYBUTYNIN CHLORIDE 5 MG/1
5 TABLET ORAL 3 TIMES DAILY
Qty: 90 TABLET | Refills: 0 | Status: SHIPPED | OUTPATIENT
Start: 2018-10-09 | End: 2018-11-05 | Stop reason: SDUPTHER

## 2018-10-09 RX ORDER — DOCUSATE SODIUM AND SENNOSIDES 8.6; 5 MG/1; MG/1
TABLET, FILM COATED ORAL
Qty: 30 TABLET | Refills: 0 | Status: SHIPPED | OUTPATIENT
Start: 2018-10-09 | End: 2018-12-04 | Stop reason: SDUPTHER

## 2018-10-11 ENCOUNTER — HOSPITAL ENCOUNTER (OUTPATIENT)
Dept: WOUND CARE | Age: 48
Discharge: HOME | End: 2018-10-11
Payer: COMMERCIAL

## 2018-10-11 VITALS
HEIGHT: 73 IN | RESPIRATION RATE: 16 BRPM | TEMPERATURE: 98.3 F | SYSTOLIC BLOOD PRESSURE: 142 MMHG | WEIGHT: 243 LBS | HEART RATE: 75 BPM | BODY MASS INDEX: 32.2 KG/M2 | DIASTOLIC BLOOD PRESSURE: 70 MMHG

## 2018-10-11 DIAGNOSIS — L89.322 STAGE II PRESSURE ULCER OF LEFT BUTTOCK (HCC): ICD-10-CM

## 2018-10-11 PROCEDURE — 99213 OFFICE O/P EST LOW 20 MIN: CPT

## 2018-10-11 PROCEDURE — 99213 OFFICE O/P EST LOW 20 MIN: CPT | Performed by: NURSE PRACTITIONER

## 2018-10-11 RX ORDER — LIDOCAINE 40 MG/G
CREAM TOPICAL ONCE
Status: DISCONTINUED | OUTPATIENT
Start: 2018-10-11 | End: 2018-10-12 | Stop reason: HOSPADM

## 2018-10-12 NOTE — PROGRESS NOTES
[facility] Progress Note    Rolanda Vu     : 1970    DATE OF VISIT:  10/11/2018    Subjective:     Rolanda Vu is a 50 y.o. male who has a Pressure ulcer Stage II  located on the L gluteal fold. Other significant symptoms or pertinent ulcer history:Pt's wound has improved. Family very involved in care. Brought in [facility] cushion and was deflated. Spent time educating family about keeping cushion inflated. Additional ulcer(s) noted? no.     Mr. Anuradha Arechiga has a past medical history of DVT of leg (deep venous thrombosis) ([facility] GERD (gastroesophageal reflux disease); Injury, crush, back; and Paraplegia ([facility] He has a past surgical history that includes back surgery and fracture surgery. His family history includes Coronary Art Dis in his maternal grandfather; Dementia in his maternal grandmother; No Known Problems in his father, mother, paternal grandfather, and sister; Tuberculosis in his paternal grandmother. Mr. Anuradha Arechiga reports that he has never smoked. He has never used smokeless tobacco. He reports that he does not drink alcohol or use drugs. His current medication list consists of BARDEX LUBRICATH CATHETER, Catheters, DEPEND REAL-FIT BRIEFS FOR MEN, Disposable Gloves, Disposable Liners, baclofen, bisacodyl, diazepam, escitalopram, oxybutynin, polyethylene glycol, rivaroxaban, and senna-docusate. Allergies: Patient has no known allergies. Pertinent items from the review of systems are discussed in the HPI; the remainder of the ROS was reviewed and is negative.      Objective:     Vitals:    10/11/18 1247   BP: (!) 142/70   Pulse: 75   Resp: 16   Temp: 98.3 °F (36.8 °C)   TempSrc: Oral   Weight: 243 lb (110.2 kg)   Height: 6' 1\" (1.854 m)       General Appearance: alert and oriented to person, place and time, well developed and well-nourished, in no acute distress  Christina-ulcer skin:  Intact, maceration  Ulcer(s):  clean, open, good granulation and

## 2018-10-18 ENCOUNTER — HOSPITAL ENCOUNTER (OUTPATIENT)
Dept: WOUND CARE | Age: 48
Discharge: HOME | End: 2018-10-18
Payer: COMMERCIAL

## 2018-10-18 VITALS
BODY MASS INDEX: 32.55 KG/M2 | DIASTOLIC BLOOD PRESSURE: 76 MMHG | RESPIRATION RATE: 16 BRPM | HEIGHT: 73 IN | HEART RATE: 78 BPM | SYSTOLIC BLOOD PRESSURE: 125 MMHG | WEIGHT: 245.6 LBS | TEMPERATURE: 97.6 F

## 2018-10-18 DIAGNOSIS — L89.322 STAGE II PRESSURE ULCER OF LEFT BUTTOCK (HCC): ICD-10-CM

## 2018-10-18 PROCEDURE — 99213 OFFICE O/P EST LOW 20 MIN: CPT

## 2018-10-18 PROCEDURE — 99212 OFFICE O/P EST SF 10 MIN: CPT | Performed by: NURSE PRACTITIONER

## 2018-10-18 RX ORDER — LIDOCAINE 40 MG/G
CREAM TOPICAL ONCE
Status: DISCONTINUED | OUTPATIENT
Start: 2018-10-18 | End: 2018-10-19 | Stop reason: HOSPADM

## 2018-10-18 NOTE — PLAN OF CARE
Problem: Pressure Ulcer:  Goal: Will show no infection signs and symptoms  Will show no infection signs and symptoms   Wound is healed today,aftercare instructions provided, ROHO care provided, f/u prn, MD orders/D/C instructions reviewed with patient, all questions answered; copy of instructions given to patient

## 2018-10-21 NOTE — PROGRESS NOTES
[address] [facility] Progress Note    Annetta Betts     : 1970    DATE OF VISIT:  10/18/2018    Subjective:     Annetta Betts is a 50 y.o. male who has a Pressure ulcer Stage II  located on the L gluteal fold. Other significant symptoms or pertinent ulcer history:Pt's wound has resolved. Time spent discussion prevention of recurrence. Family very involved in care. Additional ulcer(s) noted? no.     Mr. Stephanie Montgomery has a past medical history of DVT of leg (deep venous thrombosis) ([facility] GERD (gastroesophageal reflux disease); Injury, crush, back; and Paraplegia ([facility] He has a past surgical history that includes back surgery and fracture surgery. His family history includes Coronary Art Dis in his maternal grandfather; Dementia in his maternal grandmother; No Known Problems in his father, mother, paternal grandfather, and sister; Tuberculosis in his paternal grandmother. Mr. Stephanie Montgomery reports that he has never smoked. He has never used smokeless tobacco. He reports that he does not drink alcohol or use drugs. His current medication list consists of BARDEX LUBRICATH CATHETER, Catheters, DEPEND REAL-FIT BRIEFS FOR MEN, Disposable Gloves, Disposable Liners, baclofen, bisacodyl, diazepam, escitalopram, oxybutynin, polyethylene glycol, rivaroxaban, and senna-docusate. Allergies: Patient has no known allergies. Pertinent items from the review of systems are discussed in the HPI; the remainder of the ROS was reviewed and is negative. Objective:     Vitals:    10/18/18 1245   BP: 125/76   Pulse: 78   Resp: 16   Temp: 97.6 °F (36.4 °C)   TempSrc: Oral   Weight: 245 lb 9.6 oz (111.4 kg)   Height: 6' 1\" (1.854 m)       General Appearance: alert and oriented to person, place and time, well developed and well-nourished, in no acute distress  Christina-ulcer skin:  Intact  Ulcer(s): Wound resolved  Photos also saved in electronic chart.     Lab Results   Component Value Date    LABALBU 3.8

## 2018-11-05 ENCOUNTER — OFFICE VISIT (OUTPATIENT)
Dept: FAMILY MEDICINE CLINIC | Age: 48
End: 2018-11-05
Payer: COMMERCIAL

## 2018-11-05 VITALS
SYSTOLIC BLOOD PRESSURE: 123 MMHG | HEART RATE: 105 BPM | TEMPERATURE: 98 F | DIASTOLIC BLOOD PRESSURE: 74 MMHG | OXYGEN SATURATION: 97 %

## 2018-11-05 DIAGNOSIS — G83.10: ICD-10-CM

## 2018-11-05 DIAGNOSIS — G82.20 PARAPLEGIA (HCC): ICD-10-CM

## 2018-11-05 DIAGNOSIS — S24.101A SPINAL CORD INJURY, T1-T6 (HCC): ICD-10-CM

## 2018-11-05 DIAGNOSIS — Z23 NEED FOR INFLUENZA VACCINATION: ICD-10-CM

## 2018-11-05 DIAGNOSIS — I82.401 ACUTE DEEP VEIN THROMBOSIS (DVT) OF RIGHT LOWER EXTREMITY, UNSPECIFIED VEIN (HCC): ICD-10-CM

## 2018-11-05 DIAGNOSIS — S24.101A SPINAL CORD INJURY, T1-T6 (HCC): Primary | ICD-10-CM

## 2018-11-05 PROCEDURE — 99213 OFFICE O/P EST LOW 20 MIN: CPT | Performed by: FAMILY MEDICINE

## 2018-11-05 PROCEDURE — 90688 IIV4 VACCINE SPLT 0.5 ML IM: CPT | Performed by: FAMILY MEDICINE

## 2018-11-05 PROCEDURE — G8482 FLU IMMUNIZE ORDER/ADMIN: HCPCS | Performed by: FAMILY MEDICINE

## 2018-11-05 PROCEDURE — 90471 IMMUNIZATION ADMIN: CPT | Performed by: FAMILY MEDICINE

## 2018-11-05 PROCEDURE — G8417 CALC BMI ABV UP PARAM F/U: HCPCS | Performed by: FAMILY MEDICINE

## 2018-11-05 PROCEDURE — 1036F TOBACCO NON-USER: CPT | Performed by: FAMILY MEDICINE

## 2018-11-05 PROCEDURE — G8427 DOCREV CUR MEDS BY ELIG CLIN: HCPCS | Performed by: FAMILY MEDICINE

## 2018-11-05 RX ORDER — BISACODYL 10 MG
SUPPOSITORY, RECTAL RECTAL
Qty: 30 SUPPOSITORY | Refills: 11 | Status: SHIPPED | OUTPATIENT
Start: 2018-11-05 | End: 2019-11-11 | Stop reason: SDUPTHER

## 2018-11-05 RX ORDER — DOCUSATE SODIUM AND SENNOSIDES 8.6; 5 MG/1; MG/1
TABLET, FILM COATED ORAL
Qty: 30 TABLET | Refills: 0 | OUTPATIENT
Start: 2018-11-05

## 2018-11-05 RX ORDER — ESCITALOPRAM OXALATE 10 MG/1
TABLET ORAL
Qty: 30 TABLET | Refills: 5 | Status: SHIPPED | OUTPATIENT
Start: 2018-11-05 | End: 2019-05-30 | Stop reason: SDUPTHER

## 2018-11-05 RX ORDER — BACLOFEN 10 MG/1
TABLET ORAL
Qty: 60 TABLET | Refills: 11 | Status: SHIPPED | OUTPATIENT
Start: 2018-11-05 | End: 2019-10-29 | Stop reason: SDUPTHER

## 2018-11-05 RX ORDER — BACLOFEN 20 MG/1
TABLET ORAL
Qty: 30 TABLET | Refills: 11 | Status: SHIPPED | OUTPATIENT
Start: 2018-11-05 | End: 2019-10-29 | Stop reason: SDUPTHER

## 2018-11-05 RX ORDER — OXYBUTYNIN CHLORIDE 5 MG/1
5 TABLET ORAL 3 TIMES DAILY
Qty: 90 TABLET | Refills: 0 | OUTPATIENT
Start: 2018-11-05

## 2018-11-05 RX ORDER — OXYBUTYNIN CHLORIDE 5 MG/1
5 TABLET ORAL 3 TIMES DAILY
Qty: 90 TABLET | Refills: 11 | Status: SHIPPED | OUTPATIENT
Start: 2018-11-05 | End: 2019-10-29 | Stop reason: SDUPTHER

## 2018-11-05 RX ORDER — RIVAROXABAN 20 MG/1
TABLET, FILM COATED ORAL
Qty: 30 TABLET | Refills: 0 | OUTPATIENT
Start: 2018-11-05

## 2018-11-05 ASSESSMENT — ENCOUNTER SYMPTOMS
GASTROINTESTINAL NEGATIVE: 1
RESPIRATORY NEGATIVE: 1

## 2018-12-04 RX ORDER — DOCUSATE SODIUM AND SENNOSIDES 8.6; 5 MG/1; MG/1
TABLET, FILM COATED ORAL
Qty: 30 TABLET | Refills: 11 | Status: SHIPPED | OUTPATIENT
Start: 2018-12-04 | End: 2019-11-11 | Stop reason: SDUPTHER

## 2019-01-04 DIAGNOSIS — M62.838 MUSCLE SPASMS OF BOTH LOWER EXTREMITIES: Primary | ICD-10-CM

## 2019-01-04 RX ORDER — DIAZEPAM 2 MG/1
TABLET ORAL
Qty: 30 TABLET | Refills: 0 | Status: SHIPPED | OUTPATIENT
Start: 2019-01-04 | End: 2019-02-26 | Stop reason: SDUPTHER

## 2019-03-07 ENCOUNTER — TELEPHONE (OUTPATIENT)
Dept: FAMILY MEDICINE CLINIC | Age: 49
End: 2019-03-07

## 2019-05-03 ENCOUNTER — OFFICE VISIT (OUTPATIENT)
Dept: FAMILY MEDICINE CLINIC | Age: 49
End: 2019-05-03
Payer: COMMERCIAL

## 2019-05-03 VITALS — SYSTOLIC BLOOD PRESSURE: 128 MMHG | DIASTOLIC BLOOD PRESSURE: 68 MMHG | OXYGEN SATURATION: 98 % | HEART RATE: 82 BPM

## 2019-05-03 DIAGNOSIS — N31.9 NEUROGENIC BLADDER: ICD-10-CM

## 2019-05-03 DIAGNOSIS — F32.A DEPRESSION, UNSPECIFIED DEPRESSION TYPE: ICD-10-CM

## 2019-05-03 DIAGNOSIS — S24.101A SPINAL CORD INJURY, T1-T6 (HCC): Primary | ICD-10-CM

## 2019-05-03 DIAGNOSIS — I26.99 OTHER PULMONARY EMBOLISM WITHOUT ACUTE COR PULMONALE, UNSPECIFIED CHRONICITY (HCC): ICD-10-CM

## 2019-05-03 PROCEDURE — G8427 DOCREV CUR MEDS BY ELIG CLIN: HCPCS | Performed by: FAMILY MEDICINE

## 2019-05-03 PROCEDURE — 1036F TOBACCO NON-USER: CPT | Performed by: FAMILY MEDICINE

## 2019-05-03 PROCEDURE — G8417 CALC BMI ABV UP PARAM F/U: HCPCS | Performed by: FAMILY MEDICINE

## 2019-05-03 PROCEDURE — 99213 OFFICE O/P EST LOW 20 MIN: CPT | Performed by: FAMILY MEDICINE

## 2019-05-03 RX ORDER — DIAZEPAM 2 MG/1
2 TABLET ORAL NIGHTLY PRN
COMMUNITY
End: 2020-01-20

## 2019-05-03 ASSESSMENT — ENCOUNTER SYMPTOMS: RESPIRATORY NEGATIVE: 1

## 2019-05-03 NOTE — PROGRESS NOTES
Subjective:      Patient ID: Milo Weiss is a 50 y.o. male. HPI  Chief Complaint   Patient presents with    Spinal Injury     paralyzed     Pulmonary Embolism     on xarelto     Depression    Medication Refill     does need refills at this time they will call when he needs them      Chief complaint present illness: 44-year-old white male comes in for follow-up-depression, spinal injury with paraplegia, status post DVT and pulmonary embolus. Patient is not having any more trouble with his legs. He does occasionally feel things in his legs. Avel Connors hope. Depression seizure. Well controlled. He is alert and very active. Nice excessive bleeding or bruising    Review of Systems   Constitutional: Negative. Respiratory: Negative. Cardiovascular: Negative. Neurological: Negative. Psychiatric/Behavioral: Negative. background/entire past medical,social and family history obtained and reviewed/updated today   Objective:   Physical Exam   Constitutional: He appears well-developed and well-nourished. No distress. Cardiovascular: Normal rate and normal heart sounds. Pulmonary/Chest: Effort normal and breath sounds normal.   Musculoskeletal: He exhibits no edema. Nursing note and vitals reviewed. /68   Pulse 82   SpO2 98%     Assessment:      Guru Ruvalcaba was seen today for spinal injury, pulmonary embolism, depression and medication refill. Diagnoses and all orders for this visit:    Spinal cord injury, T1-T6 ([address]    Depression, unspecified depression type    Other pulmonary embolism without acute cor pulmonale, unspecified chronicity ([address]    Neurogenic bladder           Plan:      Return in about 6 months (around 11/3/2019). There are no Patient Instructions on file for this visit.          Gopal Wharton MA

## 2019-05-30 DIAGNOSIS — S24.101A SPINAL CORD INJURY, T1-T6 (HCC): ICD-10-CM

## 2019-05-30 DIAGNOSIS — I82.401 ACUTE DEEP VEIN THROMBOSIS (DVT) OF RIGHT LOWER EXTREMITY, UNSPECIFIED VEIN (HCC): ICD-10-CM

## 2019-05-31 RX ORDER — ESCITALOPRAM OXALATE 10 MG/1
TABLET ORAL
Qty: 30 TABLET | Refills: 0 | Status: SHIPPED | OUTPATIENT
Start: 2019-05-31 | End: 2019-06-25 | Stop reason: SDUPTHER

## 2019-05-31 RX ORDER — RIVAROXABAN 20 MG/1
TABLET, FILM COATED ORAL
Qty: 30 TABLET | Refills: 0 | Status: SHIPPED | OUTPATIENT
Start: 2019-05-31 | End: 2019-06-25 | Stop reason: SDUPTHER

## 2019-06-25 DIAGNOSIS — S24.101A SPINAL CORD INJURY, T1-T6 (HCC): ICD-10-CM

## 2019-06-25 DIAGNOSIS — I82.401 ACUTE DEEP VEIN THROMBOSIS (DVT) OF RIGHT LOWER EXTREMITY, UNSPECIFIED VEIN (HCC): ICD-10-CM

## 2019-06-25 RX ORDER — RIVAROXABAN 20 MG/1
TABLET, FILM COATED ORAL
Qty: 30 TABLET | Refills: 3 | Status: SHIPPED | OUTPATIENT
Start: 2019-06-25 | End: 2019-10-29 | Stop reason: SDUPTHER

## 2019-06-25 RX ORDER — ESCITALOPRAM OXALATE 10 MG/1
TABLET ORAL
Qty: 30 TABLET | Refills: 3 | Status: SHIPPED | OUTPATIENT
Start: 2019-06-25 | End: 2019-10-17 | Stop reason: SDUPTHER

## 2019-08-19 DIAGNOSIS — M62.838 MUSCLE SPASMS OF BOTH LOWER EXTREMITIES: ICD-10-CM

## 2019-08-19 RX ORDER — DIAZEPAM 2 MG/1
TABLET ORAL
Qty: 30 TABLET | Refills: 5 | Status: SHIPPED | OUTPATIENT
Start: 2019-08-19 | End: 2019-09-19

## 2019-08-19 NOTE — TELEPHONE ENCOUNTER
Refill Request for diazepam (VALIUM) 2 MG tablet     Last visit- 5/3/19    Told to follow up- 6 months    Pending appointment- 10/29/19    Additional Comments -

## 2019-10-17 DIAGNOSIS — S24.101A SPINAL CORD INJURY, T1-T6 (HCC): ICD-10-CM

## 2019-10-17 RX ORDER — ESCITALOPRAM OXALATE 10 MG/1
TABLET ORAL
Qty: 30 TABLET | Refills: 0 | Status: SHIPPED | OUTPATIENT
Start: 2019-10-17 | End: 2019-10-29 | Stop reason: SDUPTHER

## 2019-10-29 ENCOUNTER — OFFICE VISIT (OUTPATIENT)
Dept: FAMILY MEDICINE CLINIC | Age: 49
End: 2019-10-29
Payer: COMMERCIAL

## 2019-10-29 VITALS
SYSTOLIC BLOOD PRESSURE: 120 MMHG | TEMPERATURE: 98.1 F | DIASTOLIC BLOOD PRESSURE: 60 MMHG | HEART RATE: 99 BPM | OXYGEN SATURATION: 98 %

## 2019-10-29 DIAGNOSIS — S24.101A SPINAL CORD INJURY, T1-T6 (HCC): ICD-10-CM

## 2019-10-29 DIAGNOSIS — I82.401 ACUTE DEEP VEIN THROMBOSIS (DVT) OF RIGHT LOWER EXTREMITY, UNSPECIFIED VEIN (HCC): ICD-10-CM

## 2019-10-29 DIAGNOSIS — Z23 NEED FOR INFLUENZA VACCINATION: Primary | ICD-10-CM

## 2019-10-29 PROCEDURE — G8421 BMI NOT CALCULATED: HCPCS | Performed by: FAMILY MEDICINE

## 2019-10-29 PROCEDURE — G8482 FLU IMMUNIZE ORDER/ADMIN: HCPCS | Performed by: FAMILY MEDICINE

## 2019-10-29 PROCEDURE — 1036F TOBACCO NON-USER: CPT | Performed by: FAMILY MEDICINE

## 2019-10-29 PROCEDURE — 99214 OFFICE O/P EST MOD 30 MIN: CPT | Performed by: FAMILY MEDICINE

## 2019-10-29 PROCEDURE — 90688 IIV4 VACCINE SPLT 0.5 ML IM: CPT | Performed by: FAMILY MEDICINE

## 2019-10-29 PROCEDURE — G8427 DOCREV CUR MEDS BY ELIG CLIN: HCPCS | Performed by: FAMILY MEDICINE

## 2019-10-29 PROCEDURE — 90471 IMMUNIZATION ADMIN: CPT | Performed by: FAMILY MEDICINE

## 2019-10-29 RX ORDER — ESCITALOPRAM OXALATE 10 MG/1
10 TABLET ORAL DAILY
Qty: 30 TABLET | Refills: 5 | Status: SHIPPED | OUTPATIENT
Start: 2019-10-29 | End: 2020-03-16 | Stop reason: SDUPTHER

## 2019-10-29 RX ORDER — BACLOFEN 10 MG/1
TABLET ORAL
Qty: 60 TABLET | Refills: 5 | Status: SHIPPED | OUTPATIENT
Start: 2019-10-29 | End: 2020-04-27

## 2019-10-29 RX ORDER — BACLOFEN 20 MG/1
TABLET ORAL
Qty: 30 TABLET | Refills: 5 | Status: SHIPPED | OUTPATIENT
Start: 2019-10-29 | End: 2020-03-16 | Stop reason: SDUPTHER

## 2019-10-29 RX ORDER — OXYBUTYNIN CHLORIDE 5 MG/1
5 TABLET ORAL 3 TIMES DAILY
Qty: 90 TABLET | Refills: 5 | Status: SHIPPED | OUTPATIENT
Start: 2019-10-29 | End: 2020-03-16 | Stop reason: SDUPTHER

## 2019-10-29 ASSESSMENT — ENCOUNTER SYMPTOMS
BLOOD IN STOOL: 0
RESPIRATORY NEGATIVE: 1

## 2020-01-03 ENCOUNTER — TELEPHONE (OUTPATIENT)
Dept: FAMILY MEDICINE CLINIC | Age: 50
End: 2020-01-03

## 2020-01-13 NOTE — TELEPHONE ENCOUNTER
Want to know if you will write for another stool softener. Insurance will not cover DOK PLUS 50-8.6 MG per tablet. Pt got a letter in the mail today letting him know. Pt uses [address] Please Advise.  Thank You

## 2020-01-20 RX ORDER — DIAZEPAM 2 MG/1
TABLET ORAL
Qty: 30 TABLET | Refills: 5 | Status: SHIPPED | OUTPATIENT
Start: 2020-01-20 | End: 2020-03-16 | Stop reason: SDUPTHER

## 2020-03-16 RX ORDER — AMOXICILLIN 250 MG
CAPSULE ORAL
Qty: 30 TABLET | Refills: 11 | Status: SHIPPED | OUTPATIENT
Start: 2020-03-16 | End: 2021-03-30 | Stop reason: SDUPTHER

## 2020-03-16 RX ORDER — BACLOFEN 20 MG/1
TABLET ORAL
Qty: 30 TABLET | Refills: 5 | Status: SHIPPED | OUTPATIENT
Start: 2020-03-16 | End: 2020-10-16 | Stop reason: SDUPTHER

## 2020-03-16 RX ORDER — BISACODYL 10 MG
SUPPOSITORY, RECTAL RECTAL
Qty: 30 SUPPOSITORY | Refills: 11 | Status: SHIPPED | OUTPATIENT
Start: 2020-03-16 | End: 2021-03-18

## 2020-03-16 RX ORDER — ESCITALOPRAM OXALATE 10 MG/1
10 TABLET ORAL DAILY
Qty: 30 TABLET | Refills: 5 | Status: SHIPPED | OUTPATIENT
Start: 2020-03-16 | End: 2020-10-21 | Stop reason: SDUPTHER

## 2020-03-16 RX ORDER — DIAZEPAM 2 MG/1
TABLET ORAL
Qty: 30 TABLET | Refills: 5 | Status: SHIPPED | OUTPATIENT
Start: 2020-03-16 | End: 2020-05-16

## 2020-03-16 RX ORDER — OXYBUTYNIN CHLORIDE 5 MG/1
5 TABLET ORAL 3 TIMES DAILY
Qty: 90 TABLET | Refills: 5 | Status: SHIPPED | OUTPATIENT
Start: 2020-03-16 | End: 2020-09-17

## 2020-04-28 RX ORDER — BACLOFEN 10 MG/1
TABLET ORAL
Qty: 60 TABLET | Refills: 5 | Status: SHIPPED | OUTPATIENT
Start: 2020-04-28 | End: 2020-11-02

## 2020-06-17 ENCOUNTER — OFFICE VISIT (OUTPATIENT)
Dept: FAMILY MEDICINE CLINIC | Age: 50
End: 2020-06-17
Payer: COMMERCIAL

## 2020-06-17 VITALS
BODY MASS INDEX: 32.4 KG/M2 | HEART RATE: 70 BPM | OXYGEN SATURATION: 98 % | SYSTOLIC BLOOD PRESSURE: 122 MMHG | DIASTOLIC BLOOD PRESSURE: 68 MMHG | HEIGHT: 73 IN

## 2020-06-17 LAB
A/G RATIO: 1.5 (ref 1.1–2.2)
ALBUMIN SERPL-MCNC: 4.4 G/DL (ref 3.4–5)
ALP BLD-CCNC: 103 U/L (ref 40–129)
ALT SERPL-CCNC: 36 U/L (ref 10–40)
ANION GAP SERPL CALCULATED.3IONS-SCNC: 10 MMOL/L (ref 3–16)
AST SERPL-CCNC: 26 U/L (ref 15–37)
BASOPHILS ABSOLUTE: 0 K/UL (ref 0–0.2)
BASOPHILS RELATIVE PERCENT: 0.5 %
BILIRUB SERPL-MCNC: <0.2 MG/DL (ref 0–1)
BUN BLDV-MCNC: 16 MG/DL (ref 7–20)
CALCIUM SERPL-MCNC: 9.6 MG/DL (ref 8.3–10.6)
CHLORIDE BLD-SCNC: 99 MMOL/L (ref 99–110)
CHOLESTEROL, TOTAL: 171 MG/DL (ref 0–199)
CO2: 28 MMOL/L (ref 21–32)
CREAT SERPL-MCNC: 0.7 MG/DL (ref 0.9–1.3)
EOSINOPHILS ABSOLUTE: 0.1 K/UL (ref 0–0.6)
EOSINOPHILS RELATIVE PERCENT: 1.9 %
GFR AFRICAN AMERICAN: >60
GFR NON-AFRICAN AMERICAN: >60
GLOBULIN: 3 G/DL
GLUCOSE BLD-MCNC: 109 MG/DL (ref 70–99)
HCT VFR BLD CALC: 45.3 % (ref 40.5–52.5)
HDLC SERPL-MCNC: 24 MG/DL (ref 40–60)
HEMOGLOBIN: 15.1 G/DL (ref 13.5–17.5)
LDL CHOLESTEROL CALCULATED: 87 MG/DL
LYMPHOCYTES ABSOLUTE: 1 K/UL (ref 1–5.1)
LYMPHOCYTES RELATIVE PERCENT: 18.5 %
MCH RBC QN AUTO: 31.9 PG (ref 26–34)
MCHC RBC AUTO-ENTMCNC: 33.3 G/DL (ref 31–36)
MCV RBC AUTO: 95.9 FL (ref 80–100)
MONOCYTES ABSOLUTE: 0.5 K/UL (ref 0–1.3)
MONOCYTES RELATIVE PERCENT: 9.7 %
NEUTROPHILS ABSOLUTE: 3.6 K/UL (ref 1.7–7.7)
NEUTROPHILS RELATIVE PERCENT: 69.4 %
PDW BLD-RTO: 13.4 % (ref 12.4–15.4)
PLATELET # BLD: 226 K/UL (ref 135–450)
PMV BLD AUTO: 9 FL (ref 5–10.5)
POTASSIUM SERPL-SCNC: 4.7 MMOL/L (ref 3.5–5.1)
RBC # BLD: 4.73 M/UL (ref 4.2–5.9)
SODIUM BLD-SCNC: 137 MMOL/L (ref 136–145)
TOTAL PROTEIN: 7.4 G/DL (ref 6.4–8.2)
TRIGL SERPL-MCNC: 300 MG/DL (ref 0–150)
VLDLC SERPL CALC-MCNC: 60 MG/DL
WBC # BLD: 5.2 K/UL (ref 4–11)

## 2020-06-17 PROCEDURE — 99214 OFFICE O/P EST MOD 30 MIN: CPT | Performed by: FAMILY MEDICINE

## 2020-06-17 PROCEDURE — G8417 CALC BMI ABV UP PARAM F/U: HCPCS | Performed by: FAMILY MEDICINE

## 2020-06-17 PROCEDURE — 36415 COLL VENOUS BLD VENIPUNCTURE: CPT | Performed by: FAMILY MEDICINE

## 2020-06-17 PROCEDURE — 1036F TOBACCO NON-USER: CPT | Performed by: FAMILY MEDICINE

## 2020-06-17 PROCEDURE — 3017F COLORECTAL CA SCREEN DOC REV: CPT | Performed by: FAMILY MEDICINE

## 2020-06-17 PROCEDURE — G8427 DOCREV CUR MEDS BY ELIG CLIN: HCPCS | Performed by: FAMILY MEDICINE

## 2020-06-17 RX ORDER — DIAZEPAM 2 MG/1
2 TABLET ORAL NIGHTLY
COMMUNITY
Start: 2020-05-28 | End: 2020-09-17

## 2020-06-17 ASSESSMENT — ENCOUNTER SYMPTOMS
CONSTIPATION: 0
DIARRHEA: 0
SHORTNESS OF BREATH: 0

## 2020-06-18 ENCOUNTER — TELEPHONE (OUTPATIENT)
Dept: FAMILY MEDICINE CLINIC | Age: 50
End: 2020-06-18

## 2020-10-16 RX ORDER — BACLOFEN 20 MG/1
TABLET ORAL
Qty: 30 TABLET | Refills: 5 | Status: SHIPPED | OUTPATIENT
Start: 2020-10-16 | End: 2021-03-30 | Stop reason: SDUPTHER

## 2020-10-21 RX ORDER — ESCITALOPRAM OXALATE 10 MG/1
10 TABLET ORAL DAILY
Qty: 30 TABLET | Refills: 5 | Status: SHIPPED | OUTPATIENT
Start: 2020-10-21 | End: 2021-03-30 | Stop reason: SDUPTHER

## 2020-11-02 RX ORDER — BACLOFEN 10 MG/1
TABLET ORAL
Qty: 60 TABLET | Refills: 5 | Status: SHIPPED | OUTPATIENT
Start: 2020-11-02 | End: 2021-03-30 | Stop reason: SDUPTHER

## 2020-11-02 NOTE — TELEPHONE ENCOUNTER
Future appt scheduled 0 scheduled             Last appt 06/17/2020      Last Written 10/16/2020    baclofen (LIORESAL) 20 MG tablet  #30  5 RF

## 2020-11-02 NOTE — TELEPHONE ENCOUNTER
I called pharmacy to confirm, [facility] 1 10[facility] at Breakfast,  [facility] and 20mg at Bedtime.

## 2020-11-02 NOTE — TELEPHONE ENCOUNTER
Dad called us back. He actually has 2 Baclofen prescriptions. One for 10mg 1 at breakfast and 1 at lunch. Then the 20mg script is for 1 at bedtime.

## 2020-11-17 RX ORDER — DIAZEPAM 2 MG/1
TABLET ORAL
Qty: 30 TABLET | Refills: 0 | Status: SHIPPED | OUTPATIENT
Start: 2020-11-20 | End: 2020-12-15

## 2020-12-15 RX ORDER — DIAZEPAM 2 MG/1
TABLET ORAL
Qty: 30 TABLET | Refills: 0 | Status: SHIPPED | OUTPATIENT
Start: 2020-12-15 | End: 2021-01-12

## 2020-12-15 NOTE — TELEPHONE ENCOUNTER
Future appt scheduled 0 appt scheduled           Last appt 06/17/2020      Last Written 11/20/2020    diazePAM (VALIUM) 2 MG tablet  #30  0 RF

## 2021-01-12 DIAGNOSIS — S24.101A SPINAL CORD INJURY, T1-T6 (HCC): ICD-10-CM

## 2021-01-12 DIAGNOSIS — F32.A DEPRESSION, UNSPECIFIED DEPRESSION TYPE: ICD-10-CM

## 2021-01-12 RX ORDER — DIAZEPAM 2 MG/1
TABLET ORAL
Qty: 30 TABLET | Refills: 0 | Status: SHIPPED | OUTPATIENT
Start: 2021-01-12 | End: 2021-02-09

## 2021-01-12 RX ORDER — OXYBUTYNIN CHLORIDE 5 MG/1
5 TABLET ORAL 3 TIMES DAILY
Qty: 90 TABLET | Refills: 0 | Status: SHIPPED | OUTPATIENT
Start: 2021-01-12 | End: 2021-02-11

## 2021-01-12 NOTE — LETTER
Wing Hasmukh MD        January 12, 2021    [address] [address]      Dear Alda Aleman: We recently received a refill request for your medications and upon review you are due for a routine check up. Please contact our office to schedule. If you have any questions or concerns, please don't hesitate to call.     Sincerely,        ANTONIO Maya

## 2021-02-09 DIAGNOSIS — F32.A DEPRESSION, UNSPECIFIED DEPRESSION TYPE: ICD-10-CM

## 2021-02-09 RX ORDER — DIAZEPAM 2 MG/1
TABLET ORAL
Qty: 30 TABLET | Refills: 0 | Status: SHIPPED | OUTPATIENT
Start: 2021-02-09 | End: 2021-03-10

## 2021-02-09 NOTE — TELEPHONE ENCOUNTER
Date of last refill of this med was 1/12/2021, # of pills given 30 and # of refills given 0. Their next appointment is 0, the last date patient was seen was 6/17/2020. Does patient have medication agreement on file? No  Has drug screen been done in last 12 months if needed?  no

## 2021-02-09 NOTE — LETTER
[address] [address]  Phone: [phone]  Fax: [phone]    Omero Roger MD        February 9, 2021    [address]  [facility] [address]      Dear Jo-Ann Granados: We recently received a refill request for your medications and upon review you are due for a routine check up. Please contact our office to schedule. If you have any questions or concerns, please don't hesitate to call.     Sincerely,        ANTONIO Trevino

## 2021-02-11 DIAGNOSIS — S24.101A SPINAL CORD INJURY, T1-T6 (HCC): ICD-10-CM

## 2021-02-11 RX ORDER — OXYBUTYNIN CHLORIDE 5 MG/1
5 TABLET ORAL 3 TIMES DAILY
Qty: 90 TABLET | Refills: 0 | Status: SHIPPED | OUTPATIENT
Start: 2021-02-11 | End: 2021-03-26 | Stop reason: SDUPTHER

## 2021-03-10 DIAGNOSIS — F32.A DEPRESSION, UNSPECIFIED DEPRESSION TYPE: ICD-10-CM

## 2021-03-10 RX ORDER — DIAZEPAM 2 MG/1
TABLET ORAL
Qty: 30 TABLET | Refills: 0 | Status: SHIPPED | OUTPATIENT
Start: 2021-03-10 | End: 2021-04-06

## 2021-03-10 NOTE — LETTER
[address] [address]  Phone: [phone]  Fax: [phone]    Rizwan Blankenship MD        March 10, 2021    [place] [facility] [address]        Dear Solitario Suárez: We recently received a refill request for your medications and upon review you are due for a routine check up. Please contact our office to schedule. If you have any questions or concerns, please don't hesitate to call.     Sincerely,        ANTONIO Rock

## 2021-03-18 RX ORDER — BISACODYL 10 MG
SUPPOSITORY, RECTAL RECTAL
Qty: 30 SUPPOSITORY | Refills: 0 | Status: SHIPPED | OUTPATIENT
Start: 2021-03-18 | End: 2021-03-30 | Stop reason: SDUPTHER

## 2021-03-26 DIAGNOSIS — S24.101A SPINAL CORD INJURY, T1-T6 (HCC): ICD-10-CM

## 2021-03-26 RX ORDER — OXYBUTYNIN CHLORIDE 5 MG/1
5 TABLET ORAL 3 TIMES DAILY
Qty: 90 TABLET | Refills: 0 | Status: SHIPPED | OUTPATIENT
Start: 2021-03-26 | End: 2021-03-30 | Stop reason: SDUPTHER

## 2021-03-26 NOTE — TELEPHONE ENCOUNTER
Dad called about needing refill on oxybutynin 5mg tid, instructed dad that medication was denied due to needing appointment, dad scheduled appt for Tuesday 3/30, states they will be out of medication tomorrow would like to know if we can send script in for them since appt is scheduled

## 2021-03-30 ENCOUNTER — OFFICE VISIT (OUTPATIENT)
Dept: FAMILY MEDICINE CLINIC | Age: 51
End: 2021-03-30
Payer: COMMERCIAL

## 2021-03-30 VITALS
HEART RATE: 100 BPM | SYSTOLIC BLOOD PRESSURE: 126 MMHG | HEIGHT: 73 IN | DIASTOLIC BLOOD PRESSURE: 72 MMHG | OXYGEN SATURATION: 97 % | BODY MASS INDEX: 32.4 KG/M2 | TEMPERATURE: 97.1 F

## 2021-03-30 DIAGNOSIS — S24.101A SPINAL CORD INJURY, T1-T6 (HCC): Primary | ICD-10-CM

## 2021-03-30 DIAGNOSIS — F32.A DEPRESSION, UNSPECIFIED DEPRESSION TYPE: ICD-10-CM

## 2021-03-30 DIAGNOSIS — G82.20 PARAPLEGIA (HCC): ICD-10-CM

## 2021-03-30 DIAGNOSIS — Z86.718 PERSONAL HISTORY OF DVT (DEEP VEIN THROMBOSIS): ICD-10-CM

## 2021-03-30 PROCEDURE — G8484 FLU IMMUNIZE NO ADMIN: HCPCS | Performed by: FAMILY MEDICINE

## 2021-03-30 PROCEDURE — G8417 CALC BMI ABV UP PARAM F/U: HCPCS | Performed by: FAMILY MEDICINE

## 2021-03-30 PROCEDURE — 3017F COLORECTAL CA SCREEN DOC REV: CPT | Performed by: FAMILY MEDICINE

## 2021-03-30 PROCEDURE — 99214 OFFICE O/P EST MOD 30 MIN: CPT | Performed by: FAMILY MEDICINE

## 2021-03-30 PROCEDURE — 1036F TOBACCO NON-USER: CPT | Performed by: FAMILY MEDICINE

## 2021-03-30 PROCEDURE — G8427 DOCREV CUR MEDS BY ELIG CLIN: HCPCS | Performed by: FAMILY MEDICINE

## 2021-03-30 RX ORDER — BISACODYL 10 MG
SUPPOSITORY, RECTAL RECTAL
Qty: 90 SUPPOSITORY | Refills: 3 | Status: SHIPPED | OUTPATIENT
Start: 2021-03-30 | End: 2022-04-25

## 2021-03-30 RX ORDER — OXYBUTYNIN CHLORIDE 5 MG/1
5 TABLET ORAL 3 TIMES DAILY
Qty: 270 TABLET | Refills: 3 | Status: SHIPPED | OUTPATIENT
Start: 2021-03-30 | End: 2022-03-30

## 2021-03-30 RX ORDER — BACLOFEN 20 MG/1
TABLET ORAL
Qty: 90 TABLET | Refills: 3 | Status: SHIPPED | OUTPATIENT
Start: 2021-03-30 | End: 2022-04-22

## 2021-03-30 RX ORDER — AMOXICILLIN 250 MG
CAPSULE ORAL
Qty: 90 TABLET | Refills: 3 | Status: SHIPPED | OUTPATIENT
Start: 2021-03-30 | End: 2022-04-13

## 2021-03-30 RX ORDER — BACLOFEN 10 MG/1
10 TABLET ORAL 2 TIMES DAILY
Qty: 180 TABLET | Refills: 3 | Status: SHIPPED | OUTPATIENT
Start: 2021-03-30 | End: 2022-01-13

## 2021-03-30 RX ORDER — ESCITALOPRAM OXALATE 10 MG/1
10 TABLET ORAL DAILY
Qty: 90 TABLET | Refills: 3 | Status: SHIPPED | OUTPATIENT
Start: 2021-03-30 | End: 2022-02-23

## 2021-03-30 ASSESSMENT — ENCOUNTER SYMPTOMS
BLOOD IN STOOL: 0
SHORTNESS OF BREATH: 0
DIARRHEA: 0
CONSTIPATION: 0

## 2021-03-30 NOTE — PROGRESS NOTES
Chief Complaint   Patient presents with    Check-Up       HPI:  Leslie Koch is a 48 y.o. (: 1970) here today   for check up. HPI  Has new wheelchair. Motorized. Currently w/ some issues. In process of being repaired. lexapro doing well w/ mood. Currently taking valium at bedtime. Helps w/ relaxation. Takes oxybutynin for bladder spasms. Has been on xarelto due to hx of PE and DVT. Doing well w/ med. Has home equipment to help w/ movement and dec clot risk. Patient's medications, allergies, past medical, surgical, social and family histories were reviewed and updated as appropriate. ROS:  Review of Systems   Constitutional: Negative for fever. Respiratory: Negative for shortness of breath. Gastrointestinal: Negative for blood in stool, constipation and diarrhea.    Genitourinary:        Self cath           Microscopic Examination (no units)   Date Value   2017 YES     LDL Calculated (mg/dL)   Date Value   2020 87       Past Medical History:   Diagnosis Date    DVT of leg (deep venous thrombosis) ([address] 2017    RLE    GERD (gastroesophageal reflux disease)     Injury, crush, back     Paraplegia ([address] 2017    tree fell on patient       Family History   Problem Relation Age of Onset    No Known Problems Mother     No Known Problems Father     No Known Problems Sister     Dementia Maternal Grandmother     Coronary Art Dis Maternal Grandfather     Tuberculosis Paternal Grandmother     No Known Problems Paternal Grandfather        Social History     Socioeconomic History    Marital status:      Spouse name: Not on file    Number of children: Not on file    Years of education: Not on file    Highest education level: Not on file   Occupational History    Occupation:    Social Needs    Financial resource strain: Not on file    Food insecurity     Worry: Not on file     Inability: Not on file   Laiyaoyao needs     Medical: Not on file     Non-medical: Not on file   Tobacco Use    Smoking status: Never Smoker    Smokeless tobacco: Never Used   Substance and Sexual Activity    Alcohol use: No    Drug use: No    Sexual activity: Not Currently     Partners: Female   Lifestyle    Physical activity     Days per week: Not on file     Minutes per session: Not on file    Stress: Not on file   Relationships    Social connections     Talks on phone: Not on file     Gets together: Not on file     Attends Jew service: Not on file     Active member of club or organization: Not on file     Attends meetings of clubs or organizations: Not on file     Relationship status: Not on file    Intimate partner violence     Fear of current or ex partner: Not on file     Emotionally abused: Not on file     Physically abused: Not on file     Forced sexual activity: Not on file   Other Topics Concern    Not on file   Social History Narrative    Not on file       Prior to Visit Medications    Medication Sig Taking? Authorizing Provider   oxybutynin (DITROPAN) 5 MG tablet Take 1 tablet by mouth 3 times daily Yes Bev Osorio MD   baclofen (LIORESAL) 10 MG tablet Take 1 tablet by mouth 2 times daily Yes Bev Osorio MD   bisacodyl (DULCOLAX) 10 MG suppository USe one daily as needed Yes Bev Osorio MD   rivaroxaban (XARELTO) 20 MG TABS tablet TAKE 1 TABLET BY MOUTH DAILY Yes Bev Osorio MD   escitalopram (LEXAPRO) 10 MG tablet Take 1 tablet by mouth daily Yes Bev Osorio MD   baclofen (LIORESAL) 20 MG tablet TAKE 1 TABLET BY MOUTH EVERY NIGHT AT BEDTIME Yes Bev Osorio MD   senna-docusate (DOK PLUS) 8.6-50 MG per tablet TAKE 1 TABLET BY MOUTH EVERY NIGHT AT BEDTIME Yes Bev Osorio MD   diazePAM (VALIUM) 2 MG tablet TAKE 1 TABLET BY MOUTH EVERY NIGHT AT BEDTIME Yes Bev Osorio MD   Catheters ([address] [place] [address]) [facility] Please dispense lubrication for use of catheter.  Yes Pascale Chun MD   Disposable Gloves MISC Size large dispense 2 boxes per month Yes Winnie Guadarrama MD   Incontinence Supply Disposable (DISPOSABLE LINERS) MISC Dispense 1 box per month of Disposable Bed pads/chucks Yes Winnie Guadarrama MD   polyethylene glycol (GLYCOLAX) powder Take 17 grams mixed in liquid by mouth daily Yes Winnie Guadarrama MD   Catheters KIT 1 each by Does not apply route every 3 hours Yes Winnie Guadarrama MD   Incontinence Supply Disposable (DEPEND REAL-FIT BRIEFS FOR MEN) MISC 1 each by Does not apply route See Admin Instructions Use for urinary and stool incontinence Yes Winnie Guadarrama MD       No Known Allergies    OBJECTIVE:    /72   Pulse 100   Temp 97.1 °F (36.2 °C)   Ht 6' 1\" (1.854 m)   SpO2 97%   BMI 32.40 kg/m²     BP Readings from Last 2 Encounters:   03/30/21 126/72   06/17/20 122/68       Wt Readings from Last 3 Encounters:   10/18/18 245 lb 9.6 oz (111.4 kg)   10/11/18 243 lb (110.2 kg)   10/04/18 244 lb 6.4 oz (110.9 kg)       Physical Exam  Constitutional:       Appearance: Normal appearance. Comments: In wheelchair   HENT:      Head: Normocephalic and atraumatic. Eyes:      Extraocular Movements: Extraocular movements intact. Cardiovascular:      Rate and Rhythm: Normal rate and regular rhythm. Pulmonary:      Effort: Pulmonary effort is normal.      Breath sounds: Normal breath sounds. Abdominal:      Palpations: Abdomen is soft. Tenderness: There is no abdominal tenderness. Skin:     General: Skin is warm and dry. Neurological:      Mental Status: He is alert and oriented to person, place, and time. Psychiatric:         Mood and Affect: Mood normal.         Behavior: Behavior normal.           ASSESSMENT/PLAN:    1. Spinal cord injury, T1-T6 ([facility]  No new issues. Near baseline. Refill meds as below. Refill valium when due.    - oxybutynin (DITROPAN) 5 MG tablet; Take 1 tablet by mouth 3 times daily  Dispense: 270 tablet;  Refill: 3  - baclofen (LIORESAL) 10 MG tablet; Take 1 tablet by mouth 2 times daily  Dispense: 180 tablet; Refill: 3  - bisacodyl (DULCOLAX) 10 MG suppository; USe one daily as needed  Dispense: 90 suppository; Refill: 3  - escitalopram (LEXAPRO) 10 MG tablet; Take 1 tablet by mouth daily  Dispense: 90 tablet; Refill: 3  - baclofen (LIORESAL) 20 MG tablet; TAKE 1 TABLET BY MOUTH EVERY NIGHT AT BEDTIME  Dispense: 90 tablet; Refill: 3  - senna-docusate (DOK PLUS) 8.6-50 MG per tablet; TAKE 1 TABLET BY MOUTH EVERY NIGHT AT BEDTIME  Dispense: 90 tablet; Refill: 3    2. Paraplegia ([address]  See above. 3. Personal history of DVT (deep vein thrombosis)  Cont xarelto. Annual cbc  - rivaroxaban (XARELTO) 20 MG TABS tablet; TAKE 1 TABLET BY MOUTH DAILY  Dispense: 90 tablet; Refill: 3    4. Depression, unspecified depression type  The current medical regimen is effective;  continue present plan and medications. - escitalopram (LEXAPRO) 10 MG tablet; Take 1 tablet by mouth daily  Dispense: 90 tablet;  Refill: 3    Rpt labs and f/u in approx 6 mo

## 2021-04-06 DIAGNOSIS — F32.A DEPRESSION, UNSPECIFIED DEPRESSION TYPE: ICD-10-CM

## 2021-04-06 RX ORDER — DIAZEPAM 2 MG/1
TABLET ORAL
Qty: 30 TABLET | Refills: 0 | Status: SHIPPED | OUTPATIENT
Start: 2021-04-06 | End: 2021-05-04

## 2021-04-06 NOTE — TELEPHONE ENCOUNTER
Date of last refill of this med was 2/9/2021, # of pills given 30 and # of refills given 0. Their next appointment is 9/21/2021, the last date patient was seen was 3/30/2021. Does patient have medication agreement on file? No  Has drug screen been done in last 12 months if needed?  no

## 2021-05-04 DIAGNOSIS — F32.A DEPRESSION, UNSPECIFIED DEPRESSION TYPE: ICD-10-CM

## 2021-05-04 RX ORDER — DIAZEPAM 2 MG/1
TABLET ORAL
Qty: 30 TABLET | Refills: 0 | Status: SHIPPED | OUTPATIENT
Start: 2021-05-04 | End: 2021-06-03

## 2021-05-04 NOTE — TELEPHONE ENCOUNTER
Future appt scheduled 09/21/2021       Last appt 03/30/2021      Last Written 04/06/2021    diazePAM (VALIUM) 2 MG tablet  #30  0 RF

## 2021-06-07 DIAGNOSIS — G83.10: Primary | ICD-10-CM

## 2021-06-07 RX ORDER — DIAZEPAM 2 MG/1
TABLET ORAL
Qty: 30 TABLET | Refills: 0 | Status: SHIPPED | OUTPATIENT
Start: 2021-06-07 | End: 2021-07-01

## 2021-06-07 NOTE — TELEPHONE ENCOUNTER
Date of last refill of this med was 5/4/2021, # of pills given 30 and # of refills given 0. Their next appointment is 9/21/2021, the last date patient was seen was 3/30/2021. Does patient have medication agreement on file? No  Has drug screen been done in last 12 months if needed?  no

## 2021-08-26 DIAGNOSIS — G83.10: Primary | ICD-10-CM

## 2021-08-26 NOTE — TELEPHONE ENCOUNTER
Date of last refill of this med was 7/3/2021, # of pills given 30 and # of refills given 1. Their next appointment is 9/21/2021, the last date patient was seen was 3/30/2021. Does patient have medication agreement on file? No  Has drug screen been done in last 12 months if needed?  no

## 2021-08-27 RX ORDER — DIAZEPAM 2 MG/1
TABLET ORAL
Qty: 30 TABLET | Refills: 2 | Status: SHIPPED | OUTPATIENT
Start: 2021-08-27 | End: 2021-09-27

## 2021-11-29 ENCOUNTER — TELEPHONE (OUTPATIENT)
Dept: FAMILY MEDICINE CLINIC | Age: 51
End: 2021-11-29

## 2021-11-29 NOTE — TELEPHONE ENCOUNTER
They have no transportation. Every time he uses the transfer board, it makes it worse. He really needs someone to come into the home. Virtual Visit?

## 2021-11-29 NOTE — TELEPHONE ENCOUNTER
Virtual visit would be better than nothing, but he really needs to have this wound looked at and addressed. I would still recommend trying to arrange transportation for wound clinic visit at [facility]. In the interim, a virtual visit could be done.

## 2021-11-29 NOTE — TELEPHONE ENCOUNTER
Patient dad calling in, dad gets home care his nurse looked at pt wound today and said it needs taken care of asap. However pt dad wants to know if we could just send a nurse or something there to look at it because the slide board is how pt transfers and every transfer makes wound worse. Pt does not receive any home care services.  Wound is on buttocks

## 2021-11-30 ENCOUNTER — VIRTUAL VISIT (OUTPATIENT)
Dept: FAMILY MEDICINE CLINIC | Age: 51
End: 2021-11-30
Payer: COMMERCIAL

## 2021-11-30 DIAGNOSIS — L89.329 PRESSURE INJURY OF SKIN OF LEFT BUTTOCK, UNSPECIFIED INJURY STAGE: Primary | ICD-10-CM

## 2021-11-30 PROCEDURE — 99422 OL DIG E/M SVC 11-20 MIN: CPT

## 2021-11-30 ASSESSMENT — ENCOUNTER SYMPTOMS
GASTROINTESTINAL NEGATIVE: 1
ROS SKIN COMMENTS: LEFT BUTTOCK
RESPIRATORY NEGATIVE: 1

## 2021-11-30 NOTE — PROGRESS NOTES
2021    TELEHEALTH EVALUATION -- Audio/Visual (During NYK-29 public health emergency)    HPI: Patient doing a video visit today to inquire about getting home care assistance within his home. Patient is in need of wound care management. Has a pressure ulcer on left buttock. Has not had his wound evaluated. Needs in home assistance because he does not have a  to transport him to the hospital or wound care center. Patient is paraplegic below the waist.  Pt states what his causing his wound is friction sheer moving with a wooden sliding board to his wheelchair to and and from bed or chairs. Patient father who was in the room who can visually assessed the wound states the wound is been present for roughly 2 months and has been worsening. States that is becoming larger. Patient and father state there is no blood-tinged drainage but there is noted clear drainage from the wound. Patient father states the wound is roughly 2 inches wide and 2 inches top to bottom. Patient states Thanksgiving day he started to run and on and off fever but he never actually check his temperature to know an exact result. Patient was lying in bed unable to actually show me the wound on the video visit today. Masha Blackwood (:  1970) has requested an audio/video evaluation for the following concern(s): Wound to left buttocks that is worsening. Review of Systems   Constitutional: Positive for diaphoresis and fever. On and off fever but did not check temp. Started Thanksgiving day. HENT: Negative. Respiratory: Negative. Cardiovascular: Negative. Negative for chest pain and palpitations. Gastrointestinal: Negative. Genitourinary:        Uses strait cath kits   Skin: Positive for wound. Left buttock   Neurological: Negative. Psychiatric/Behavioral: Negative. Prior to Visit Medications    Medication Sig Taking?  Authorizing Provider   oxybutynin (DITROPAN) 5 MG tablet Take 1 tablet by mouth 3 times daily Yes Chely Walker MD   baclofen (LIORESAL) 10 MG tablet Take 1 tablet by mouth 2 times daily Yes Chely Walker MD   bisacodyl (DULCOLAX) 10 MG suppository USe one daily as needed Yes Chely Walker MD   rivaroxaban (XARELTO) 20 MG TABS tablet TAKE 1 TABLET BY MOUTH DAILY Yes Chely Walker MD   escitalopram (LEXAPRO) 10 MG tablet Take 1 tablet by mouth daily Yes Chely Walker MD   baclofen (LIORESAL) 20 MG tablet TAKE 1 TABLET BY MOUTH EVERY NIGHT AT BEDTIME Yes Chely Walker MD   senna-docusate (DOK PLUS) 8.6-50 MG per tablet TAKE 1 TABLET BY MOUTH EVERY NIGHT AT BEDTIME Yes Chely Walker MD   Catheters ([address] [place] [address]) [facility] Please dispense lubrication for use of catheter.  Yes Haroldo Rosa MD   Disposable Gloves MISC Size large dispense 2 boxes per month Yes Haroldo Rosa MD   Incontinence Supply Disposable (DISPOSABLE LINERS) MISC Dispense 1 box per month of Disposable Bed pads/chucks Yes Haroldo Rosa MD   polyethylene glycol (GLYCOLAX) powder Take 17 grams mixed in liquid by mouth daily Yes Haroldo Rosa MD   Catheters KIT 1 each by Does not apply route every 3 hours Yes Haroldo Rosa MD   Incontinence Supply Disposable (DEPEND REAL-FIT BRIEFS FOR MEN) MISC 1 each by Does not apply route See Admin Instructions Use for urinary and stool incontinence Yes Haroldo Rosa MD       Social History     Tobacco Use    Smoking status: Never Smoker    Smokeless tobacco: Never Used   Vaping Use    Vaping Use: Never used   Substance Use Topics    Alcohol use: No    Drug use: No        No Known Allergies,   Past Medical History:   Diagnosis Date    DVT of leg (deep venous thrombosis) ([facility] 04/2017    RLE    GERD (gastroesophageal reflux disease)     Injury, crush, back     Paraplegia ([facility] 02/2017    tree fell on patient       PHYSICAL EXAMINATION:  [ INSTRUCTIONS:  \"[x]\" Indicates a positive item  \"[]\" Indicates a negative item  -- DELETE ALL ITEMS NOT EXAMINED]  Vital Signs: (As obtained by patient/caregiver or practitioner observation)    Blood pressure-N/A heart rate-N/A   respiratory rate-N/A   temperature-N/A pulse oximetry-N/A    Constitutional: [x] Appears well-developed and well-nourished [x] No apparent distress      [] Abnormal-   Mental status  [x] Alert and awake  [x] Oriented to person/place/time []Able to follow commands      Eyes:  EOM    [x]  Normal  [] Abnormal-  Sclera  []  Normal  [] Abnormal -         Discharge [x]  None visible  [] Abnormal -    HENT:   [x] Normocephalic, atraumatic. [] Abnormal   [] Mouth/Throat: Mucous membranes are moist.     External Ears [] Normal  [] Abnormal-     Neck: [x] No visualized mass     Pulmonary/Chest: [x] Respiratory effort normal.  [x] No visualized signs of difficulty breathing or respiratory distress        [] Abnormal-      Musculoskeletal:   [] Normal gait with no signs of ataxia         [x] Normal range of motion of neck        [] Abnormal-       Neurological:        [x] No Facial Asymmetry (Cranial nerve 7 motor function) (limited exam to video visit)          [x] No gaze palsy        [] Abnormal-         Skin:        [x] No significant exanthematous lesions or discoloration noted on facial skin         [] Abnormal-            Psychiatric:       [x] Normal Affect [] No Hallucinations        [] Abnormal-     Other pertinent observable physical exam findings-none on this visit    Due to this being a TeleHealth encounter, evaluation of the following organ systems is limited: Vitals/Constitutional/EENT/Resp/CV/GI//MS/Neuro/Skin/Heme-Lymph-Imm. ASSESSMENT/PLAN:  1. Pressure injury of skin of left buttock, unspecified injury stage  Patient father state wound is been present x2 months roughly. It was stated the wound on the left buttocks is roughly 2 inches wide by 2 inches tall at the bottom. Wound is becoming worse in regards to growing in size and draining clear fluid. I would recommend an home care by wound care to assess, treat, and evaluate the patient's wound to left buttocks. Referral placed to [facility]. - External Referral To Home Health      I told the patient we will place referral and follow back up with him in regards to the next steps to getting home care in his home for wound treatment. Patient states he had a fever that started on Thanksgiving day but is unsure of exact temperature result. The patient symptoms worsen I may go ahead and prophylactically treat with oral antibiotics given the fact this wound could be related to his fever. There is a possibility the patient could become septic if the wound is infected and left untreated. Patient will follow back up if symptoms worsen or fail to improve. No follow-ups on file. An  electronic signature was used to authenticate this note. --Maurita Denver, APRN - CNP on 11/30/2021 at 1:36 PM        Pursuant to the emergency declaration under the [facility], [facility] waiver authority and the [employer] Act, this Virtual  Visit was conducted, with patient's consent, to reduce the patient's risk of exposure to COVID-19 and provide continuity of care for an established patient. Services were provided through a video synchronous discussion virtually to substitute for in-person clinic visit.

## 2021-12-02 ENCOUNTER — TELEPHONE (OUTPATIENT)
Dept: FAMILY MEDICINE CLINIC | Age: 51
End: 2021-12-02

## 2021-12-02 NOTE — TELEPHONE ENCOUNTER
Called and spoke to Dieudonne Reeves again at [employer] they are sending someone out tomorrow.

## 2021-12-02 NOTE — TELEPHONE ENCOUNTER
Spoke with [facility]. Informed him that they will be out tomorrow  Gave him [employer] phone number ([phone].

## 2021-12-06 ENCOUNTER — HOSPITAL ENCOUNTER (INPATIENT)
Age: 51
LOS: 3 days | Discharge: HOME HEALTH | DRG: 364 | End: 2021-12-09
Attending: EMERGENCY MEDICINE | Admitting: INTERNAL MEDICINE
Payer: COMMERCIAL

## 2021-12-06 ENCOUNTER — APPOINTMENT (OUTPATIENT)
Dept: CT IMAGING | Age: 51
DRG: 364 | End: 2021-12-06
Payer: COMMERCIAL

## 2021-12-06 ENCOUNTER — TELEPHONE (OUTPATIENT)
Dept: FAMILY MEDICINE CLINIC | Age: 51
End: 2021-12-06

## 2021-12-06 DIAGNOSIS — L89.90 INFECTED DECUBITUS ULCER, UNSPECIFIED ULCER STAGE: Primary | ICD-10-CM

## 2021-12-06 DIAGNOSIS — L08.9 INFECTED DECUBITUS ULCER, UNSPECIFIED ULCER STAGE: Primary | ICD-10-CM

## 2021-12-06 PROBLEM — L89.95 DECUBITUS ULCER, UNSTAGEABLE WITH INFECTION (HCC): Status: ACTIVE | Noted: 2021-12-06

## 2021-12-06 LAB
A/G RATIO: 0.9 (ref 1.1–2.2)
ALBUMIN SERPL-MCNC: 4.1 G/DL (ref 3.4–5)
ALP BLD-CCNC: 147 U/L (ref 40–129)
ALT SERPL-CCNC: 63 U/L (ref 10–40)
ANION GAP SERPL CALCULATED.3IONS-SCNC: 11 MMOL/L (ref 3–16)
AST SERPL-CCNC: 34 U/L (ref 15–37)
BASOPHILS ABSOLUTE: 0.1 K/UL (ref 0–0.2)
BASOPHILS RELATIVE PERCENT: 0.6 %
BILIRUB SERPL-MCNC: 0.3 MG/DL (ref 0–1)
BUN BLDV-MCNC: 12 MG/DL (ref 7–20)
CALCIUM SERPL-MCNC: 9.7 MG/DL (ref 8.3–10.6)
CHLORIDE BLD-SCNC: 97 MMOL/L (ref 99–110)
CO2: 27 MMOL/L (ref 21–32)
CREAT SERPL-MCNC: 0.8 MG/DL (ref 0.9–1.3)
EOSINOPHILS ABSOLUTE: 0.1 K/UL (ref 0–0.6)
EOSINOPHILS RELATIVE PERCENT: 0.4 %
GFR AFRICAN AMERICAN: >60
GFR NON-AFRICAN AMERICAN: >60
GLUCOSE BLD-MCNC: 89 MG/DL (ref 70–99)
HCT VFR BLD CALC: 47.6 % (ref 40.5–52.5)
HEMOGLOBIN: 15.5 G/DL (ref 13.5–17.5)
LACTIC ACID, SEPSIS: 1.2 MMOL/L (ref 0.4–1.9)
LYMPHOCYTES ABSOLUTE: 1.3 K/UL (ref 1–5.1)
LYMPHOCYTES RELATIVE PERCENT: 10.9 %
MCH RBC QN AUTO: 31.3 PG (ref 26–34)
MCHC RBC AUTO-ENTMCNC: 32.5 G/DL (ref 31–36)
MCV RBC AUTO: 96.4 FL (ref 80–100)
MONOCYTES ABSOLUTE: 0.5 K/UL (ref 0–1.3)
MONOCYTES RELATIVE PERCENT: 4.5 %
NEUTROPHILS ABSOLUTE: 9.6 K/UL (ref 1.7–7.7)
NEUTROPHILS RELATIVE PERCENT: 83.6 %
PDW BLD-RTO: 13.6 % (ref 12.4–15.4)
PLATELET # BLD: 414 K/UL (ref 135–450)
PMV BLD AUTO: 7.3 FL (ref 5–10.5)
POTASSIUM SERPL-SCNC: 4 MMOL/L (ref 3.5–5.1)
RBC # BLD: 4.94 M/UL (ref 4.2–5.9)
SARS-COV-2, NAAT: NOT DETECTED
SEDIMENTATION RATE, ERYTHROCYTE: 78 MM/HR (ref 0–20)
SODIUM BLD-SCNC: 135 MMOL/L (ref 136–145)
TOTAL PROTEIN: 8.7 G/DL (ref 6.4–8.2)
WBC # BLD: 11.5 K/UL (ref 4–11)

## 2021-12-06 PROCEDURE — 96365 THER/PROPH/DIAG IV INF INIT: CPT

## 2021-12-06 PROCEDURE — 85025 COMPLETE CBC W/AUTO DIFF WBC: CPT

## 2021-12-06 PROCEDURE — 86140 C-REACTIVE PROTEIN: CPT

## 2021-12-06 PROCEDURE — 6360000002 HC RX W HCPCS: Performed by: EMERGENCY MEDICINE

## 2021-12-06 PROCEDURE — 2580000003 HC RX 258: Performed by: NURSE PRACTITIONER

## 2021-12-06 PROCEDURE — 36415 COLL VENOUS BLD VENIPUNCTURE: CPT

## 2021-12-06 PROCEDURE — 2580000003 HC RX 258: Performed by: EMERGENCY MEDICINE

## 2021-12-06 PROCEDURE — 72193 CT PELVIS W/DYE: CPT

## 2021-12-06 PROCEDURE — 1200000000 HC SEMI PRIVATE

## 2021-12-06 PROCEDURE — 84145 PROCALCITONIN (PCT): CPT

## 2021-12-06 PROCEDURE — 85652 RBC SED RATE AUTOMATED: CPT

## 2021-12-06 PROCEDURE — 87635 SARS-COV-2 COVID-19 AMP PRB: CPT

## 2021-12-06 PROCEDURE — 80053 COMPREHEN METABOLIC PANEL: CPT

## 2021-12-06 PROCEDURE — 87077 CULTURE AEROBIC IDENTIFY: CPT

## 2021-12-06 PROCEDURE — 83605 ASSAY OF LACTIC ACID: CPT

## 2021-12-06 PROCEDURE — 87205 SMEAR GRAM STAIN: CPT

## 2021-12-06 PROCEDURE — 99284 EMERGENCY DEPT VISIT MOD MDM: CPT

## 2021-12-06 PROCEDURE — 6360000004 HC RX CONTRAST MEDICATION: Performed by: EMERGENCY MEDICINE

## 2021-12-06 PROCEDURE — 87040 BLOOD CULTURE FOR BACTERIA: CPT

## 2021-12-06 PROCEDURE — 87070 CULTURE OTHR SPECIMN AEROBIC: CPT

## 2021-12-06 PROCEDURE — 6360000002 HC RX W HCPCS: Performed by: NURSE PRACTITIONER

## 2021-12-06 RX ADMIN — VANCOMYCIN HYDROCHLORIDE 1500 MG: 10 INJECTION, POWDER, LYOPHILIZED, FOR SOLUTION INTRAVENOUS at 21:29

## 2021-12-06 RX ADMIN — PIPERACILLIN SODIUM AND TAZOBACTAM SODIUM 3375 MG: 3; .375 INJECTION, POWDER, LYOPHILIZED, FOR SOLUTION INTRAVENOUS at 16:46

## 2021-12-06 RX ADMIN — IOPAMIDOL 75 ML: 755 INJECTION, SOLUTION INTRAVENOUS at 17:30

## 2021-12-06 ASSESSMENT — ENCOUNTER SYMPTOMS
BLOOD IN STOOL: 0
ABDOMINAL PAIN: 0
DIARRHEA: 0
COUGH: 0
NAUSEA: 0
BACK PAIN: 0
VOMITING: 0
RHINORRHEA: 0
EYE PAIN: 0
SORE THROAT: 0
SHORTNESS OF BREATH: 0

## 2021-12-06 ASSESSMENT — PAIN SCALES - GENERAL: PAINLEVEL_OUTOF10: 0

## 2021-12-06 NOTE — ED PROVIDER NOTES
ED Attending Attestation Note    This patient was seen by the advanced practice provider. I have seen and examined the patient. I agree with the workup, evaluation, management, and diagnosis. The care plan has been discussed. 46 y.o. male with history of paraplegia following a remote history of thoracic spine fracture presents for evaluation of decubitus ulcer of left buttock, now with purulent drainage. Complains of subjective fever, hasn't measured a temperature. Focused exam:   Gen: 46 y.o. male, NAD  HEENT: NCAT. PERRL. EOMI. CV: RRR w/o MRG  Lungs: CTAB. No incr WOB. Skin: Left sacral ulcer with small amount of foul smelling purulent drainage, mild surrounding erythema, no extension to perineum      Sed rate is elevated. Culture of wound obtained. Vanc/Zosyn started. CT PELVIS W CONTRAST Additional Contrast? None   Final Result   Soft tissue ulceration with a 4 cm fluid collection containing gas in the   subcutaneous soft tissues adjacent to the ulceration suggesting an abscess. Presentation inconsistent with Mary's gangrene. Will admit to hospital medicine for further evaluation and treatment. Impression  1. Infected decubitus ulcer, unspecified ulcer stage        For further details of the patient's emergency department visit, please see the CLAUDY's documentation. Moises Abraham MD     This report has been produced using speech recognition software and may contain errors related to that system including errors in grammar, punctuation, and spelling, as well as words and phrases that may be inappropriate. If there are any questions or concerns please feel free to contact the dictating provider for clarification.        Moises Abraham MD  12/06/21 2003

## 2021-12-07 PROBLEM — S42.023A CLAVICULAR FRACTURE, CLOSED, SHAFT: Status: RESOLVED | Noted: 2017-02-04 | Resolved: 2021-12-07

## 2021-12-07 PROBLEM — L89.324 PRESSURE ULCER OF ISCHIUM, LEFT, STAGE IV (HCC): Status: ACTIVE | Noted: 2021-12-06

## 2021-12-07 PROBLEM — L03.317 CELLULITIS AND ABSCESS OF BUTTOCK: Status: ACTIVE | Noted: 2021-12-07

## 2021-12-07 PROBLEM — S22.059A T6 VERTEBRAL FRACTURE (HCC): Status: RESOLVED | Noted: 2017-02-04 | Resolved: 2021-12-07

## 2021-12-07 PROBLEM — S27.1XXA HEMOTHORAX, TRAUMATIC: Status: RESOLVED | Noted: 2017-02-04 | Resolved: 2021-12-07

## 2021-12-07 PROBLEM — L89.322 PRESSURE ULCER OF LEFT ISCHIUM, STAGE 2 (HCC): Status: RESOLVED | Noted: 2018-06-27 | Resolved: 2021-12-07

## 2021-12-07 PROBLEM — S22.49XA CLOSED FRACTURE OF MULTIPLE RIBS: Status: RESOLVED | Noted: 2017-02-04 | Resolved: 2021-12-07

## 2021-12-07 PROBLEM — G83.10 PARALYSIS OF LOWER LIMB (HCC): Status: RESOLVED | Noted: 2017-02-04 | Resolved: 2021-12-07

## 2021-12-07 PROBLEM — L89.322 STAGE II PRESSURE ULCER OF LEFT BUTTOCK (HCC): Status: RESOLVED | Noted: 2018-09-27 | Resolved: 2021-12-07

## 2021-12-07 PROBLEM — S27.0XXA PNEUMOTHORAX, CLOSED, TRAUMATIC: Status: RESOLVED | Noted: 2017-02-04 | Resolved: 2021-12-07

## 2021-12-07 PROBLEM — S42.109A CLOSED FRACTURE OF SCAPULA: Status: RESOLVED | Noted: 2017-02-04 | Resolved: 2021-12-07

## 2021-12-07 PROBLEM — L02.31 CELLULITIS AND ABSCESS OF BUTTOCK: Status: ACTIVE | Noted: 2021-12-07

## 2021-12-07 PROBLEM — I60.9 SAH (SUBARACHNOID HEMORRHAGE) (HCC): Status: RESOLVED | Noted: 2017-03-08 | Resolved: 2021-12-07

## 2021-12-07 PROBLEM — K21.9 GERD (GASTROESOPHAGEAL REFLUX DISEASE): Status: ACTIVE | Noted: 2021-12-07

## 2021-12-07 PROBLEM — L89.90 PRESSURE INJURY OF SKIN WITH INFECTION: Status: ACTIVE | Noted: 2021-12-06

## 2021-12-07 LAB
A/G RATIO: 0.8 (ref 1.1–2.2)
ALBUMIN SERPL-MCNC: 3.2 G/DL (ref 3.4–5)
ALP BLD-CCNC: 119 U/L (ref 40–129)
ALT SERPL-CCNC: 51 U/L (ref 10–40)
ANION GAP SERPL CALCULATED.3IONS-SCNC: 13 MMOL/L (ref 3–16)
AST SERPL-CCNC: 28 U/L (ref 15–37)
BASOPHILS ABSOLUTE: 0 K/UL (ref 0–0.2)
BASOPHILS RELATIVE PERCENT: 0.3 %
BILIRUB SERPL-MCNC: 0.4 MG/DL (ref 0–1)
BILIRUBIN URINE: NEGATIVE
BLOOD, URINE: NEGATIVE
BUN BLDV-MCNC: 12 MG/DL (ref 7–20)
C-REACTIVE PROTEIN: 42.9 MG/L (ref 0–5.1)
CALCIUM SERPL-MCNC: 8.8 MG/DL (ref 8.3–10.6)
CHLORIDE BLD-SCNC: 100 MMOL/L (ref 99–110)
CLARITY: ABNORMAL
CO2: 24 MMOL/L (ref 21–32)
COLOR: YELLOW
CREAT SERPL-MCNC: 0.9 MG/DL (ref 0.9–1.3)
EOSINOPHILS ABSOLUTE: 0.1 K/UL (ref 0–0.6)
EOSINOPHILS RELATIVE PERCENT: 1.1 %
GFR AFRICAN AMERICAN: >60
GFR NON-AFRICAN AMERICAN: >60
GLUCOSE BLD-MCNC: 90 MG/DL (ref 70–99)
GLUCOSE URINE: NEGATIVE MG/DL
HCT VFR BLD CALC: 42.3 % (ref 40.5–52.5)
HEMOGLOBIN: 13.6 G/DL (ref 13.5–17.5)
INR BLD: 1.56 (ref 0.88–1.12)
KETONES, URINE: NEGATIVE MG/DL
LEUKOCYTE ESTERASE, URINE: NEGATIVE
LYMPHOCYTES ABSOLUTE: 1.3 K/UL (ref 1–5.1)
LYMPHOCYTES RELATIVE PERCENT: 11.1 %
MCH RBC QN AUTO: 31.1 PG (ref 26–34)
MCHC RBC AUTO-ENTMCNC: 32.1 G/DL (ref 31–36)
MCV RBC AUTO: 96.8 FL (ref 80–100)
MICROSCOPIC EXAMINATION: ABNORMAL
MONOCYTES ABSOLUTE: 0.6 K/UL (ref 0–1.3)
MONOCYTES RELATIVE PERCENT: 4.9 %
NEUTROPHILS ABSOLUTE: 9.5 K/UL (ref 1.7–7.7)
NEUTROPHILS RELATIVE PERCENT: 82.6 %
NITRITE, URINE: NEGATIVE
PDW BLD-RTO: 13.6 % (ref 12.4–15.4)
PH UA: 5 (ref 5–8)
PLATELET # BLD: 386 K/UL (ref 135–450)
PMV BLD AUTO: 7.3 FL (ref 5–10.5)
POTASSIUM REFLEX MAGNESIUM: 4.2 MMOL/L (ref 3.5–5.1)
PROCALCITONIN: 0.05 NG/ML (ref 0–0.15)
PROTEIN UA: NEGATIVE MG/DL
PROTHROMBIN TIME: 18 SEC (ref 9.9–12.7)
RBC # BLD: 4.37 M/UL (ref 4.2–5.9)
SODIUM BLD-SCNC: 137 MMOL/L (ref 136–145)
SPECIFIC GRAVITY UA: >=1.03 (ref 1–1.03)
TOTAL PROTEIN: 7.3 G/DL (ref 6.4–8.2)
URINE REFLEX TO CULTURE: ABNORMAL
URINE TYPE: ABNORMAL
UROBILINOGEN, URINE: 0.2 E.U./DL
VANCOMYCIN RANDOM: 6.3 UG/ML
WBC # BLD: 11.5 K/UL (ref 4–11)

## 2021-12-07 PROCEDURE — 81003 URINALYSIS AUTO W/O SCOPE: CPT

## 2021-12-07 PROCEDURE — 1200000000 HC SEMI PRIVATE

## 2021-12-07 PROCEDURE — 11043 DBRDMT MUSC&/FSCA 1ST 20/<: CPT | Performed by: INTERNAL MEDICINE

## 2021-12-07 PROCEDURE — 0KBP0ZZ EXCISION OF LEFT HIP MUSCLE, OPEN APPROACH: ICD-10-PCS | Performed by: INTERNAL MEDICINE

## 2021-12-07 PROCEDURE — 6370000000 HC RX 637 (ALT 250 FOR IP): Performed by: INTERNAL MEDICINE

## 2021-12-07 PROCEDURE — 85610 PROTHROMBIN TIME: CPT

## 2021-12-07 PROCEDURE — 6360000002 HC RX W HCPCS: Performed by: INTERNAL MEDICINE

## 2021-12-07 PROCEDURE — 51702 INSERT TEMP BLADDER CATH: CPT

## 2021-12-07 PROCEDURE — 80202 ASSAY OF VANCOMYCIN: CPT

## 2021-12-07 PROCEDURE — 80053 COMPREHEN METABOLIC PANEL: CPT

## 2021-12-07 PROCEDURE — 36415 COLL VENOUS BLD VENIPUNCTURE: CPT

## 2021-12-07 PROCEDURE — 99254 IP/OBS CNSLTJ NEW/EST MOD 60: CPT | Performed by: INTERNAL MEDICINE

## 2021-12-07 PROCEDURE — 2580000003 HC RX 258: Performed by: INTERNAL MEDICINE

## 2021-12-07 PROCEDURE — 99232 SBSQ HOSP IP/OBS MODERATE 35: CPT | Performed by: INTERNAL MEDICINE

## 2021-12-07 PROCEDURE — 85025 COMPLETE CBC W/AUTO DIFF WBC: CPT

## 2021-12-07 RX ORDER — SODIUM CHLORIDE 0.9 % (FLUSH) 0.9 %
5-40 SYRINGE (ML) INJECTION PRN
Status: DISCONTINUED | OUTPATIENT
Start: 2021-12-07 | End: 2021-12-09 | Stop reason: HOSPADM

## 2021-12-07 RX ORDER — ACETAMINOPHEN 325 MG/1
650 TABLET ORAL EVERY 6 HOURS PRN
Status: DISCONTINUED | OUTPATIENT
Start: 2021-12-07 | End: 2021-12-09 | Stop reason: HOSPADM

## 2021-12-07 RX ORDER — POTASSIUM CHLORIDE 7.45 MG/ML
10 INJECTION INTRAVENOUS PRN
Status: DISCONTINUED | OUTPATIENT
Start: 2021-12-07 | End: 2021-12-09 | Stop reason: HOSPADM

## 2021-12-07 RX ORDER — SODIUM CHLORIDE 9 MG/ML
INJECTION, SOLUTION INTRAVENOUS CONTINUOUS
Status: DISCONTINUED | OUTPATIENT
Start: 2021-12-07 | End: 2021-12-09 | Stop reason: HOSPADM

## 2021-12-07 RX ORDER — SODIUM CHLORIDE 9 MG/ML
25 INJECTION, SOLUTION INTRAVENOUS PRN
Status: DISCONTINUED | OUTPATIENT
Start: 2021-12-07 | End: 2021-12-09 | Stop reason: HOSPADM

## 2021-12-07 RX ORDER — SODIUM CHLORIDE 0.9 % (FLUSH) 0.9 %
5-40 SYRINGE (ML) INJECTION EVERY 12 HOURS SCHEDULED
Status: DISCONTINUED | OUTPATIENT
Start: 2021-12-07 | End: 2021-12-09 | Stop reason: HOSPADM

## 2021-12-07 RX ORDER — BACLOFEN 10 MG/1
20 TABLET ORAL NIGHTLY
Status: DISCONTINUED | OUTPATIENT
Start: 2021-12-07 | End: 2021-12-09 | Stop reason: HOSPADM

## 2021-12-07 RX ORDER — OXYBUTYNIN CHLORIDE 5 MG/1
5 TABLET ORAL 3 TIMES DAILY
Status: DISCONTINUED | OUTPATIENT
Start: 2021-12-07 | End: 2021-12-09 | Stop reason: HOSPADM

## 2021-12-07 RX ORDER — ONDANSETRON 2 MG/ML
4 INJECTION INTRAMUSCULAR; INTRAVENOUS EVERY 6 HOURS PRN
Status: DISCONTINUED | OUTPATIENT
Start: 2021-12-07 | End: 2021-12-09 | Stop reason: HOSPADM

## 2021-12-07 RX ORDER — ESCITALOPRAM OXALATE 10 MG/1
10 TABLET ORAL DAILY
Status: DISCONTINUED | OUTPATIENT
Start: 2021-12-07 | End: 2021-12-09 | Stop reason: HOSPADM

## 2021-12-07 RX ORDER — MAGNESIUM SULFATE IN WATER 40 MG/ML
2000 INJECTION, SOLUTION INTRAVENOUS PRN
Status: DISCONTINUED | OUTPATIENT
Start: 2021-12-07 | End: 2021-12-09 | Stop reason: HOSPADM

## 2021-12-07 RX ORDER — POTASSIUM CHLORIDE 20 MEQ/1
40 TABLET, EXTENDED RELEASE ORAL PRN
Status: DISCONTINUED | OUTPATIENT
Start: 2021-12-07 | End: 2021-12-09 | Stop reason: HOSPADM

## 2021-12-07 RX ORDER — POLYETHYLENE GLYCOL 3350 17 G/17G
17 POWDER, FOR SOLUTION ORAL DAILY PRN
Status: DISCONTINUED | OUTPATIENT
Start: 2021-12-07 | End: 2021-12-09 | Stop reason: HOSPADM

## 2021-12-07 RX ORDER — METRONIDAZOLE 250 MG/1
500 TABLET ORAL EVERY 8 HOURS SCHEDULED
Status: DISCONTINUED | OUTPATIENT
Start: 2021-12-07 | End: 2021-12-09 | Stop reason: HOSPADM

## 2021-12-07 RX ORDER — ACETAMINOPHEN 650 MG/1
650 SUPPOSITORY RECTAL EVERY 6 HOURS PRN
Status: DISCONTINUED | OUTPATIENT
Start: 2021-12-07 | End: 2021-12-09 | Stop reason: HOSPADM

## 2021-12-07 RX ORDER — ONDANSETRON 4 MG/1
4 TABLET, ORALLY DISINTEGRATING ORAL EVERY 8 HOURS PRN
Status: DISCONTINUED | OUTPATIENT
Start: 2021-12-07 | End: 2021-12-09 | Stop reason: HOSPADM

## 2021-12-07 RX ADMIN — ESCITALOPRAM OXALATE 10 MG: 10 TABLET ORAL at 10:38

## 2021-12-07 RX ADMIN — CEFEPIME 2000 MG: 2 INJECTION, POWDER, FOR SOLUTION INTRAVENOUS at 01:13

## 2021-12-07 RX ADMIN — METRONIDAZOLE 500 MG: 250 TABLET ORAL at 22:54

## 2021-12-07 RX ADMIN — ENOXAPARIN SODIUM 30 MG: 100 INJECTION SUBCUTANEOUS at 22:53

## 2021-12-07 RX ADMIN — OXYBUTYNIN CHLORIDE 5 MG: 5 TABLET ORAL at 22:54

## 2021-12-07 RX ADMIN — BACLOFEN 20 MG: 10 TABLET ORAL at 22:54

## 2021-12-07 RX ADMIN — METRONIDAZOLE 500 MG: 250 TABLET ORAL at 14:09

## 2021-12-07 RX ADMIN — OXYBUTYNIN CHLORIDE 5 MG: 5 TABLET ORAL at 10:38

## 2021-12-07 RX ADMIN — SODIUM CHLORIDE: 9 INJECTION, SOLUTION INTRAVENOUS at 17:34

## 2021-12-07 RX ADMIN — SODIUM CHLORIDE: 9 INJECTION, SOLUTION INTRAVENOUS at 01:13

## 2021-12-07 RX ADMIN — VANCOMYCIN HYDROCHLORIDE 1500 MG: 10 INJECTION, POWDER, LYOPHILIZED, FOR SOLUTION INTRAVENOUS at 15:14

## 2021-12-07 RX ADMIN — ENOXAPARIN SODIUM 30 MG: 100 INJECTION SUBCUTANEOUS at 10:39

## 2021-12-07 RX ADMIN — CEFEPIME 2000 MG: 2 INJECTION, POWDER, FOR SOLUTION INTRAVENOUS at 14:09

## 2021-12-07 RX ADMIN — OXYBUTYNIN CHLORIDE 5 MG: 5 TABLET ORAL at 14:09

## 2021-12-07 ASSESSMENT — PAIN SCALES - GENERAL: PAINLEVEL_OUTOF10: 0

## 2021-12-07 NOTE — CONSULTS
Pharmacy to Dose Vancomycin    Dx: 46 y.o. male with history of paraplegia following a remote history of thoracic spine fracture presents for evaluation of decubitus ulcer of left buttock. Goal trough = 10-20  Pt wt = 102.3 kg  Recent Labs     12/06/21  1631   CREATININE 0.8*     Recent Labs     12/06/21  1631   WBC 11.5*     Vanc 1500 mg x1 in ED. Mean elimination rate constant was significantly smaller and mean elimination half-life was significantly longer in spinal cord injured patients. The longer dosing interval predicted in spinal cord injured patients trended toward statistical significance. We conclude that with chronic spinal cord injury, 1) the elimination half-life of vancomycin is increased and these patients may require longer dosing intervals and 2) distribution volume and predicted vancomycin doses are unaltered compared with controls. Will Pulse Dose Vanc.   Vancomycin random 12/7 [address] [place] [address] 12/7/2021 3:03 AM

## 2021-12-07 NOTE — PROGRESS NOTES
Pharmacy Vancomycin Consult     Vancomycin Day: 1  Current Dosing: Pulse Dose  Current indication: SSTI    Temp max:  99.1    Recent Labs     12/06/21  1631 12/07/21  0512   BUN 12 12   CREATININE 0.8* 0.9   WBC 11.5* 11.5*       Intake/Output Summary (Last 24 hours) at 12/7/2021 1344  Last data filed at 12/7/2021 1343  Gross per 24 hour   Intake 1139.66 ml   Output 350 ml   Net 789.66 ml     Culture Date      Source                       Results  Blood and Wound: NGTD    Ht Readings from Last 1 Encounters:   12/06/21 6' 1\" (1.854 m)        Wt Readings from Last 1 Encounters:   12/06/21 225 lb 9.6 oz (102.3 kg)       Body mass index is 29.76 kg/m². Estimated Creatinine Clearance: 122 mL/min (based on SCr of 0.9 mg/dL). Random : 6.3    Assessment/Plan:  Vanc 1500mg times one, Level tomorrow at noon.  Continue to pulse dose   Racquel ARRIOLA 12/7/20211:46 PM  .

## 2021-12-07 NOTE — CARE COORDINATION
Case Management Assessment  Initial Evaluation      Patient Name: Barbara Zepeda  YOB: 1970  Diagnosis: Decubitus ulcer, unstageable with infection ([facility] [L89.95, L08.9]  Infected decubitus ulcer, unspecified ulcer stage [L89.90, L08.9]  Date / Time: 12/6/2021  1:33 PM    Admission status/Date:inpt  Chart Reviewed: Yes      Patient Interviewed: No   Family Interviewed:  Yes - pt's father      Hospitalization in the last 30 days:  No      Health Care Decision Maker :     (CM - must 1st enter selection under Navigator - emergency contact- Health Care Decision Maker Relationship and pick relationship)   Who do you trust or have selected to make healthcare decisions for you      Met with: pt's father via TC  Interview conducted  (bedside/phone):    Current PCP:   Aster Donovan MD      Financial  Commercial  Precert required for SNF : Y, N          3 night stay required - Y, N    ADLS  Support Systems/Care Needs: Parent, Friends/Neighbors  Transportation: family    Meal Preparation: family    Housing  Living Arrangements: home with parents  Steps: ramp  Intent for return to present living arrangements: Yes  Identified Issues: no    Home Care Information  Active with [address] : No Agency:(Services)  Type of Home Care Services: Nursing Services  Passport/Waiver : No  :                      Phone Number:    Passport/Waiver Services: no          Durable Medical Equiptment   DME Provider:   Equipment:   Walker___Cane___RTS___ BSC___Shower Chair___Hospital Bed___W/C____Other___electric scooter_____  02 at ____Liter(s)---wears(frequency)_______ HHN ___ CPAP___ BiPap___   N/A____      Home O2 Use :  No    If No for home O2---if presently on O2 during hospitalization:  No  if yes CM to follow for potential DC O2 need  Informed of need for care provider to bring portable home O2 tank on day of discharge for nursing to connect prior to leaving:   Not Indicated  Verbalized agreement/Understanding:   Not Indicated    Community Service Affiliation  Dialysis:  No    · Agency:  · Location:  · Dialysis Schedule:  · Phone:   · Fax: Other Community Services: (ex:PT/OT,Mental Health,Wound Clinic, Cardio/Pul [address])    DISCHARGE PLAN: Explained Case Management role/services. Reviewed chart. Unable to reach pt via TC. Writer spoke with pt's father for initial interview. Pt from home with parents and plan return. Pt with hx of paraplegia since 2017. Per father pt uses electric scooter around the house. Following.

## 2021-12-07 NOTE — PROGRESS NOTES
Consult has been called to Dr. Alireza Goode on 12/7/21. Spoke with perfect served dr Alireza Goode.  8:12 AM    Anson Maderar  12/7/2021

## 2021-12-07 NOTE — H&P
Hospital Medicine History & Physical      PCP: Yane Fitzgerald MD    Date of Service: Pt seen/examined on 12/6/21 and admitted on 12/6/21 to Inpatient    Chief Complaint   Patient presents with    Wound Check     pt brought in by EMS for wound to L flank area that has been progressively gotten worse over the last 2 months. History Of Present Illness: The patient is a 46 y.o. male with PMH below, presents with would to L buttock draining blood and foul smelling fluid. He reports the wound to this area has been worsening ove rthe last 2 mos. He has hx of paraplegia below ~T10, neurogenic bladder 2/2 remote spinal injury. He is WC bound at baseline. He denies pain to the are bc of his hx of paraplegia. Past Medical History:        Diagnosis Date    DVT of leg (deep venous thrombosis) ([facility] 04/2017    RLE    GERD (gastroesophageal reflux disease)     Injury, crush, back     Paraplegia ([facility] 02/2017    tree fell on patient       Past Surgical History:        Procedure Laterality Date    BACK SURGERY      spine, collar bone, rib fx repairs    FRACTURE SURGERY         Medications Prior to Admission:    Prior to Admission medications    Medication Sig Start Date End Date Taking?  Authorizing Provider   oxybutynin (DITROPAN) 5 MG tablet Take 1 tablet by mouth 3 times daily 3/30/21   Yane Fitzgerald MD   baclofen (LIORESAL) 10 MG tablet Take 1 tablet by mouth 2 times daily 3/30/21   Yane Fitzgerald MD   bisacodyl (DULCOLAX) 10 MG suppository USe one daily as needed 3/30/21   Yane Fitzgerald MD   rivaroxaban (XARELTO) 20 MG TABS tablet TAKE 1 TABLET BY MOUTH DAILY 3/30/21   Yane Fitzgerald MD   escitalopram (LEXAPRO) 10 MG tablet Take 1 tablet by mouth daily 3/30/21   Yane Fitzgerald MD   baclofen (LIORESAL) 20 MG tablet TAKE 1 TABLET BY MOUTH EVERY NIGHT AT BEDTIME 3/30/21   Yane Fitzgerald MD   senna-docusate (DOK PLUS) 8.6-50 MG per tablet TAKE 1 TABLET BY MOUTH EVERY NIGHT AT BEDTIME 3/30/21   Diego Horn MD   Catheters ([address] [place] [address]) MISC Please dispense lubrication for use of catheter. 3/29/18   Javad Hoang MD   Disposable Gloves MISC Size large dispense 2 boxes per month 3/21/18   Javad Hoang MD   Incontinence Supply Disposable (DISPOSABLE LINERS) MISC Dispense 1 box per month of Disposable Bed pads/chucks 3/21/18   Javad Hoang MD   polyethylene glycol [facility]) powder Take 17 grams mixed in liquid by mouth daily 3/20/18   Javad Hoang MD   Catheters KIT 1 each by Does not apply route every 3 hours 11/20/17   Javad Haong MD   Incontinence Supply Disposable (DEPEND REAL-FIT BRIEFS FOR MEN) MISC 1 each by Does not apply route See Admin Instructions Use for urinary and stool incontinence 8/4/17   Javad Hoang MD       Allergies:  Patient has no known allergies. Social History:    TOBACCO:   reports that he has never smoked. He has never used smokeless tobacco.  ETOH:   reports no history of alcohol use. Family History:  Reviewed in detail and negative for DM, Early CAD, Cancer (except as below). Positive as follows:        Problem Relation Age of Onset    No Known Problems Mother     No Known Problems Father     No Known Problems Sister     Dementia Maternal Grandmother     Coronary Art Dis Maternal Grandfather     Tuberculosis Paternal Grandmother     No Known Problems Paternal Grandfather        REVIEW OF SYSTEMS:   Pertinent positives/negatives as follows: L buttock draining blood and foul smelling fluid, paraplegia, neurogenic bladder, and as discussed in HPI, otherwise a complete ROS performed and all other systems are negative  PHYSICAL EXAM PERFORMED:    /69   Pulse 79   Temp 97.9 °F (36.6 °C) (Oral)   Resp 16   Ht 6' 1\" (1.854 m)   Wt 220 lb (99.8 kg)   SpO2 97%   BMI 29.03 kg/m²     GEN:  A&Ox3, NAD. HEENT:  NC/AT,EOMI, MMM, no erythema/exudates or visible masses. CVS:  Normal S1,S2. RRR. Without M/G/R.   LUNG:   CTA-B. no wheezes, rales or rhonchi  ABD:  Soft, ND/NT, BS+ x4. Without G/R.  EXT: 2+ pulses, no c/c/e. Brisk cap refill. PSY:  Thought process intact, affect appropriate. ABRIL:  CN III-XII intact, lack of sensation/motor below ~ umbilicus. Above this area intact. SKIN: L gluteal decub (unstageable) foul smelling purulent/blood discharge. Chart review shows recent radiographs:  CT PELVIS W CONTRAST Additional Contrast? None    Result Date: 12/6/2021  EXAMINATION: CT OF THE PELVIS WITH CONTRAST 12/6/2021 5:32 pm TECHNIQUE: CT of the pelvis was performed with the administration of intravenous contrast. Multiplanar reformatted images are provided for review. Dose modulation, iterative reconstruction, and/or weight based adjustment of the mA/kV was utilized to reduce the radiation dose to as low as reasonably achievable. COMPARISON: None. HISTORY: ORDERING SYSTEM PROVIDED HISTORY: Pressure ulcer of the left buttock with drainage. TECHNOLOGIST PROVIDED HISTORY: Reason for exam:->Pressure ulcer of the left buttock with drainage. Additional Contrast?->None Decision Support Exception - unselect if not a suspected or confirmed emergency medical condition->Emergency Medical Condition (MA) Reason for Exam: Pressure ulcer of the left buttock with drainage. FINDINGS: The bowel loops visualized appear unremarkable. The bladder appears unremarkable. No pelvic masses or free pelvic fluid. There is a fluid collection with gas in the posterior subcutaneous soft tissues measuring 4 cm in diameter. There is infiltration of the fat surrounding this. Soft tissue ulceration with a 4 cm fluid collection containing gas in the subcutaneous soft tissues adjacent to the ulceration suggesting an abscess.      CBC:  Recent Labs     12/06/21  1631   WBC 11.5*   HGB 15.5   HCT 47.6           RENAL  Recent Labs     12/06/21  1631   *   K 4.0   CL 97*   CO2 27   BUN 12   CREATININE 0.8* GLUCOSE 89       LFT'S:  Recent Labs     12/06/21  1631   AST 34   ALT 63*   BILITOT 0.3   ALKPHOS 147*       CARDIAC ENZYMES:   Lab Results   Component Value Date    PROBNP 148 (H) 09/18/2017       LACTIC ACID:  Recent Labs     12/06/21  1631   LACTSEPSIS 1.2       PHYSICIAN CERTIFICATION  I certify that Sendy Hartley is expected to be hospitalized for 2 midnights based on the following assessment and plan:    ASSESSMENT/PLAN:  1. Decubitis ulcer w/ infection and 4 cm abscess. Hx chronic ulcer(s) 2/2 paraplegia. IV vanco and cefepime. WC and ID c/s.  GS c/s for I&D. Does not meet SIRS qSOFA. Lactic normal.  Chk PCT. 2. Leukocytosis, mild at 11.5. Repeat in am.  3. Anticoagulated on Xarelto, held for probable I&D. 4. Hx paraplegia, chronic, T10 nn level and below affected. Supportive measures. DVT Prophylaxis: Lx  Diet: NPO  Code Status: Full Code   PT/OT Eval Status: Will order if needed and as patient condition allows  Dispo - Admit to inpatient     Crystal Prajapati MD    Thank you Waylon Castro MD for the opportunity to be involved in this patient's care. If you have any questions or concerns please feel free to contact me via the [employer] at [phone]. This chart was generated using the [address] [place] [address] [address]  dictation system. I created this record but it may contain dictation errors given the limitations of this technology.

## 2021-12-07 NOTE — PROGRESS NOTES
4 Eyes Skin Assessment     The patient is being assess for   Admission    I agree that 2 RN's have performed a thorough Head to Toe Skin Assessment on the patient. ALL assessment sites listed below have been assessed. Areas assessed for pressure by both nurses:   [x]   Head, Face, and Ears   [x]   Shoulders, Back, and Chest, Abdomen  [x]   Arms, Elbows, and Hands   [x]   Coccyx, Sacrum, and Ischium  [x]   Legs, Feet, and Heels        Decubitus ulcer to left lower lateral buttock, structural muscle exposed, partial slough noted scattered abrasions to BLE. Skin Assessed Under all Medical Devices by both nurses:  garcia              All Mepilex Borders were peeled back and area peeked at by both nurses:  Yes  Please list where Mepilex Borders are located:  Bilateral heels              **SHARE this note so that the co-signing nurse is able to place an eSignature**    Co-signer eSignature: Electronically signed by Russell Tam RN on 12/7/21 at 7:05 AM EST    Does the Patient have Skin Breakdown related to pressure?   Yes [facility] WOUND CARE was Initiated documentation include the Christina-wound, Wound Assessment, Measurements, Dressing Treatment, Drainage, and Color\",                 Solomon Prevention initiated:  No   Wound Care Orders initiated:  No      WOC nurse consulted for Pressure Injury (Stage 3,4, Unstageable, DTI, NWPT, Complex wounds)and New or Established Ostomies:  Yes      Primary Nurse eSignature: Electronically signed by Gudelia Nicholson RN on 12/7/21 at 3:11 AM EST

## 2021-12-07 NOTE — FLOWSHEET NOTE
Patient admitted to room 207-2 from ER. Patient oriented to room, call light, bed rails, phone, lights and bathroom. Patient instructed about the schedule of the day including: vital sign frequency, lab draws, possible tests, frequency of MD and staff rounds, I &O's, and prescribed diet. Bed alarm on. Bed locked, in lowest position, side rails up 2/4, call light within reach.          12/06/21 2345   Vital Signs   Temp 98.4 °F (36.9 °C)   Temp Source Oral   Pulse 84   Heart Rate Source Monitor   Resp 18   /84   BP Location Left upper arm   Patient Position Semi fowlers   Level of Consciousness Alert (0)   MEWS Score 1   Patient Currently in Pain Denies   Oxygen Therapy   SpO2 98 %   O2 Device None (Room air)   Height and Weight   Height 6' 1\" (1.854 m)   Weight 225 lb 9.6 oz (102.3 kg)   Weight Method Actual; Bed scale   BSA (Calculated - sq m) 2.3 sq meters   BMI (Calculated) 29.8

## 2021-12-07 NOTE — CONSULTS
there were thoughts of possible surgical debridement today (and the surgery team was actually just here to see him at the end of my visit, but we'll defer on their consult for now (I didn't realize they had been called, and I did a bit of bedside debridement myself; see below)). No other concerning symptoms at home, dizziness, SOB, CP, vomiting, etc; one episode of diarrhea. Mr. Lenin Nicole has a past medical history of Clavicular fracture, closed, shaft, Closed fracture of multiple ribs, Closed fracture of scapula, DVT of leg (deep venous thrombosis) ([address] Hemothorax, traumatic, Injury, crush, back, Pneumothorax, closed, traumatic, Pulmonary embolism without acute cor pulmonale ([address] SAH (subarachnoid hemorrhage) ([address] Spinal cord injury, thoracic (T1-T6) ([address] Stage II pressure ulcer of left buttock ([address] and T6 vertebral fracture ([address] He has a past surgical history that includes back surgery and fracture surgery. His family history includes Coronary Art Dis in his maternal grandfather; Dementia in his maternal grandmother; No Known Problems in his father, mother, paternal grandfather, and sister; Tuberculosis in his paternal grandmother. Mr. Lenin Nicole reports that he has never smoked. He has never used smokeless tobacco. He reports that he does not drink alcohol and does not use drugs.     Current Facility-Administered Medications: cefepime (MAXIPIME) 2000 mg IVPB minibag, 2,000 mg, IntraVENous, Q12H  baclofen (LIORESAL) tablet 20 mg, 20 mg, Oral, Nightly  escitalopram (LEXAPRO) tablet 10 mg, 10 mg, Oral, Daily  oxybutynin (DITROPAN) tablet 5 mg, 5 mg, Oral, TID  0.9 % sodium chloride infusion, , IntraVENous, Continuous  sodium chloride flush 0.9 % injection 5-40 mL, 5-40 mL, IntraVENous, 2 times per day  sodium chloride flush 0.9 % injection 5-40 mL, 5-40 mL, IntraVENous, PRN  0.9 % sodium chloride infusion, 25 mL, IntraVENous, PRN  enoxaparin (LOVENOX) injection 30 mg, 30 mg, SubCUTAneous, BID  ondansetron (ZOFRAN-ODT) disintegrating tablet 4 mg, 4 mg, Oral, Q8H PRN **OR** ondansetron (ZOFRAN) injection 4 mg, 4 mg, IntraVENous, Q6H PRN  polyethylene glycol (GLYCOLAX) packet 17 g, 17 g, Oral, Daily PRN  acetaminophen (TYLENOL) tablet 650 mg, 650 mg, Oral, Q6H PRN **OR** acetaminophen (TYLENOL) suppository 650 mg, 650 mg, Rectal, Q6H PRN  potassium chloride (KLOR-CON M) extended release tablet 40 mEq, 40 mEq, Oral, PRN **OR** potassium bicarb-citric acid (EFFER-K) effervescent tablet 40 mEq, 40 mEq, Oral, PRN **OR** potassium chloride 10 mEq/100 mL IVPB (Peripheral Line), 10 mEq, IntraVENous, PRN  magnesium sulfate 2000 mg in 50 mL IVPB premix, 2,000 mg, IntraVENous, PRN  influenza quadrivalent split vaccine (FLUZONE;FLUARIX;FLULAVAL;AFLURIA) injection 0.5 mL, 0.5 mL, IntraMUSCular, Prior to discharge  vancomycin (VANCOCIN) intermittent dosing (placeholder), , Other, RX Placeholder     This is day 2 of vancomycin and cefepime. No recent ABx at home. Allergies: Patient has no known allergies. Pertinent items from the review of systems are discussed in the HPI; the remainder of the ROS was reviewed and is negative.      Objective:     Vital signs over the last 24 hours:  Temp  Av.2 °F (36.8 °C)  Min: 97.9 °F (36.6 °C)  Max: 98.4 °F (36.9 °C)  Pulse  Av  Min: 75  Max: 84  Systolic (69SGL), IKF:960 , Min:126 , NJR:589   Diastolic (62YLA), WVF:69, Min:67, Max:84  Resp  Av.5  Min: 16  Max: 18  SpO2  Av.2 %  Min: 97 %  Max: 100 %    Constitutional:  well-developed, well-nourished, NAD, conversant, overweight, a bit fatigued  Psychiatric:  oriented to person, place and time; mood and affect appropriate for the situation   Eyes:  pupils equal, round and reactive to light; sclerae anicteric, conjunctivae not pale  ENT:  atraumatic; oral mucosa moist, no thrush or ulcers  Resp:  lungs clear to auscultation BL; no use of accessory muscles or other signs of resp distress  Cardiovascular: heart regular, no gallop, no murmur; no lower extremity edema; no IV phlebitis  GI:  abdomen soft, NT, ND, normal bowel sounds, no palpable masses or organomegaly  Musculoskeletal:  no clubbing, cyanosis or petechiae; extremities with no gross effusions, joint misalignment or acute arthritis - no clear acute arthritis or bursitis at left hip  Skin: warm, dry, normal turgor; no rash; at the left ischial region is an unstageable pressure ulcer, with some lysing eschar, some fat necrosis, bloody-purulent drainage, malodor, at least some inferior undermining. See photos in Media tab for initial appearance last night; around that is a bit of redness, warmth, induration, but not severe.   ______________________________    Recent Labs     12/07/21  0512 12/06/21  1631   WBC 11.5* 11.5*   HGB 13.6 15.5   HCT 42.3 47.6   MCV 96.8 96.4    414     Lab Results   Component Value Date    CREATININE 0.9 12/07/2021     Lab Results   Component Value Date    LABALBU 3.2 (L) 12/07/2021     Lab Results   Component Value Date    ALT 51 (H) 12/07/2021    AST 28 12/07/2021    ALKPHOS 119 12/07/2021    BILITOT 0.4 12/07/2021      Other recent pertinent labs: Anion gap from 11 to 13. Lactate 1.2, PCT only 0.05. WBC diff with mild neutrophilia; ESR 78.   ______________________________    Recent pertinent micro results:  Two sets of BCx pending. Superficial WCx pending, Gram stain with 1+ WBCs, GPC, GPR, GNR. .  ______________________________    Recent imaging results (last 7 days):     CT PELVIS W CONTRAST Additional Contrast? None    Result Date: 12/6/2021  Soft tissue ulceration with a 4 cm fluid collection containing gas in the subcutaneous soft tissues adjacent to the ulceration suggesting an abscess.  [on my review, the soft tissue inflammation does extend right up to the ischial tuberosity, but there are no clear changes of osteomyelitis at this point.]     Assessment:     Patient Active Problem List Diagnosis Code    T5 spinal cord injury ([facility] S24.102A    Neurogenic bladder N31.9    Neurogenic bowel K59.2    Depression F32. A    Paraplegia ([facility]) G82.20    Pressure ulcer of ischium, left, stage IV ([facility]) L89.324    GERD (gastroesophageal reflux disease) K21.9    Cellulitis and abscess of buttock L02.31, L03.317     Assessment of today's active condition(s):     -- Background of spinal cord injury, paraplegia, neurogenic bowel and bladder. -- One prior stage 2 buttock-ischial pressure ulcer a few years ago. -- New unstageable (at least prior to debridement) pressure ulcer of the left ischial region now, with some mild cellulitis, abscess, at least a risk of osteomyelitis. Not severely systemically ill, but a good deal of local tissue necrosis and infection. Infection polymicrobial by Gram stain, which is not surprising, and definitely with some malodor (fat necrosis, anaerobes, maybe something like Proteus too?). Treatment recs:     Continue cefepime and vancomycin for now. Add some oral Flagyl. Await final cultures. Discussed the importance of protein intake and especially offloading (for now, as much time in bed as possible, turning side to side; when he must sit at home, be sure to use his ROHO). I'll see him in the wound care center after discharge, and we can see whether he needs upgraded offloading surfaces at that time. Daily dressings for now with Triad, silver alginate rope, gauze, and a bordered foam (starting tonight). I would anticipate that he might need to be here for a couple of days, to make sure that the wound is starting to improve, and to have at least some culture data back. I don't currently anticipate that he will need IV Abx after discharge.      He might need OR debridement at some point, probably only if bone is involved, but for now I think we can hold off on that, and allow him to eat; D/W Dr. [employer], Alexander Manning and his RN.  ______________________    I did recommend a bedside debridement today, and he agreed. Obtained verbal consent for the procedure, explained anticipated benefits and also risks, including pain, bleeding, damage to deeper structures, and he agreed to proceed. Procedure note:     No topical anesthesia used, given his spinal cord injury and lack of sensation there    Using a scissors and forceps, I sharply debrided the left ischium ulcer(s) down through and including the removal of muscle/fascia. The type(s) of tissue debrided included slough and necrotic/eschar. Total Surface Area Debrided: approx 10 sq cm. I was able to get rid of most of that eschar, along with some sloughy fat and fascial necrosis beneath it; there is undermining both proximally (where some purulent drainage was found) and distally (where the wound bed was already partly granular); did not remove all of the necrotic tissue, but made some decent progress; no bone encountered. The ulcers were then irrigated with normal saline solution. The procedure was completed with a moderate amount of bleeding, and hemostasis was with pressure. The patient tolerated the procedure well, with no significant complications. No complaints of pain. Wound re-dressed with Triad, silver alginate rope, 4x4s, bordered foam dressing. Photo from after debridement just now --        I'll see him again tomorrow, likely toward the end of the day, hoping for a micro update by that time.      Electronically signed by Albania Lowry MD on 12/7/2021 at 11:06 AM

## 2021-12-07 NOTE — PROGRESS NOTES
Placed indwelling catheter per order via sterile technique. Yellow, sediment urine returned. Pt tolerated well. No complaints.

## 2021-12-08 LAB
GRAM STAIN RESULT: ABNORMAL
ORGANISM: ABNORMAL
VANCOMYCIN RANDOM: 6.9 UG/ML
WOUND/ABSCESS: ABNORMAL
WOUND/ABSCESS: ABNORMAL

## 2021-12-08 PROCEDURE — 6370000000 HC RX 637 (ALT 250 FOR IP): Performed by: INTERNAL MEDICINE

## 2021-12-08 PROCEDURE — 36415 COLL VENOUS BLD VENIPUNCTURE: CPT

## 2021-12-08 PROCEDURE — 99232 SBSQ HOSP IP/OBS MODERATE 35: CPT | Performed by: INTERNAL MEDICINE

## 2021-12-08 PROCEDURE — 6360000002 HC RX W HCPCS: Performed by: INTERNAL MEDICINE

## 2021-12-08 PROCEDURE — 80202 ASSAY OF VANCOMYCIN: CPT

## 2021-12-08 PROCEDURE — 1200000000 HC SEMI PRIVATE

## 2021-12-08 PROCEDURE — 2580000003 HC RX 258: Performed by: INTERNAL MEDICINE

## 2021-12-08 RX ORDER — AMOXICILLIN AND CLAVULANATE POTASSIUM 875; 125 MG/1; MG/1
1 TABLET, FILM COATED ORAL 2 TIMES DAILY
Qty: 14 TABLET | Refills: 0 | Status: SHIPPED | OUTPATIENT
Start: 2021-12-08 | End: 2021-12-16

## 2021-12-08 RX ADMIN — SODIUM CHLORIDE: 9 INJECTION, SOLUTION INTRAVENOUS at 21:44

## 2021-12-08 RX ADMIN — SODIUM CHLORIDE: 9 INJECTION, SOLUTION INTRAVENOUS at 06:38

## 2021-12-08 RX ADMIN — ESCITALOPRAM OXALATE 10 MG: 10 TABLET ORAL at 10:46

## 2021-12-08 RX ADMIN — METRONIDAZOLE 500 MG: 250 TABLET ORAL at 13:38

## 2021-12-08 RX ADMIN — OXYBUTYNIN CHLORIDE 5 MG: 5 TABLET ORAL at 13:38

## 2021-12-08 RX ADMIN — CEFEPIME 2000 MG: 2 INJECTION, POWDER, FOR SOLUTION INTRAVENOUS at 13:43

## 2021-12-08 RX ADMIN — CEFEPIME 2000 MG: 2 INJECTION, POWDER, FOR SOLUTION INTRAVENOUS at 01:37

## 2021-12-08 RX ADMIN — METRONIDAZOLE 500 MG: 250 TABLET ORAL at 21:36

## 2021-12-08 RX ADMIN — BACLOFEN 20 MG: 10 TABLET ORAL at 21:36

## 2021-12-08 RX ADMIN — Medication 10 ML: at 10:50

## 2021-12-08 RX ADMIN — ENOXAPARIN SODIUM 30 MG: 100 INJECTION SUBCUTANEOUS at 21:36

## 2021-12-08 RX ADMIN — ENOXAPARIN SODIUM 30 MG: 100 INJECTION SUBCUTANEOUS at 10:46

## 2021-12-08 RX ADMIN — OXYBUTYNIN CHLORIDE 5 MG: 5 TABLET ORAL at 21:36

## 2021-12-08 RX ADMIN — METRONIDAZOLE 500 MG: 250 TABLET ORAL at 06:38

## 2021-12-08 RX ADMIN — OXYBUTYNIN CHLORIDE 5 MG: 5 TABLET ORAL at 10:46

## 2021-12-08 ASSESSMENT — PAIN SCALES - GENERAL: PAINLEVEL_OUTOF10: 0

## 2021-12-08 NOTE — PROGRESS NOTES
Pt A/Ox4. VSS. Pt unlabored, respirations even & easy. No distress noted. Shift assessment complete. See flowsheet. PM meds given. See MAR. Turned onto R side. Hayes output yellow & clear. Denies needs at this time. Bed in low position. Bed alarm on. Call light within reach.

## 2021-12-08 NOTE — CARE COORDINATION
INTERDISCIPLINARY PLAN OF CARE CONFERENCE    Date/Time: 12/8/2021 12:26 PM  Completed by: Aleks Brandt RN, Case Management      Patient Name:  Yoav Chase  YOB: 1970  Admitting Diagnosis: Decubitus ulcer, unstageable with infection ([facility] [L89.95, L08.9]  Infected decubitus ulcer, unspecified ulcer stage [L89.90, L08.9]     Admit Date/Time:  12/6/2021  1:33 PM    Chart reviewed. Interdisciplinary team contacted or reviewed plan related to patient progress and discharge plans. Disciplines included Case Management, Nursing, and Dietitian. Current Status:INPT   PT/OT recommendation for discharge plan of care: n/a    Expected D/C Disposition:  Home  Confirmed plan with patient and/or family Yes confirmed with: (name) with pt  Met with:pt at bedside. Discharge Plan Comments: Pt cont to plan to go home at discharge. Pt states that he would like a referral to [facility] for wound care at home. Referral called at this time to [facility] with [facility] at this time. Will follow.      Home O2 in place on admit: No  Pt informed of need to bring portable home O2 tank on day of discharge for nursing to connect prior to leaving:  Not Indicated  Verbalized agreement/Understanding:  Not Indicated

## 2021-12-08 NOTE — PROGRESS NOTES
[facility] Infectious Disease Progress Note      Lori Middleton     : 1970    DATE OF VISIT:  2021  DATE OF ADMISSION:  2021       Subjective:     Lori Middleton is a 46 y.o. male whom I've been seeing for an infected left ischial pressure ulcer. Since I last saw him, he's generally feeling well. No F/C/D, no sore throat or mouth from ABx, no IV site pain, no rash or pruritus, no N/V/D. Being turned in bed, had his dressing changed last night. Mr. Bernadine Howard has a past medical history of Clavicular fracture, closed, shaft, Closed fracture of multiple ribs, Closed fracture of scapula, DVT of leg (deep venous thrombosis) ([address] Hemothorax, traumatic, Injury, crush, back, Pneumothorax, closed, traumatic, Pulmonary embolism without acute cor pulmonale ([address] SAH (subarachnoid hemorrhage) ([address] Spinal cord injury, thoracic (T1-T6) ([address] Stage II pressure ulcer of left buttock ([address] and T6 vertebral fracture ([address]     Current Facility-Administered Medications: cefepime (MAXIPIME) 2000 mg IVPB minibag, 2,000 mg, IntraVENous, Q12H  baclofen (LIORESAL) tablet 20 mg, 20 mg, Oral, Nightly  escitalopram (LEXAPRO) tablet 10 mg, 10 mg, Oral, Daily  oxybutynin (DITROPAN) tablet 5 mg, 5 mg, Oral, TID  0.9 % sodium chloride infusion, , IntraVENous, Continuous  sodium chloride flush 0.9 % injection 5-40 mL, 5-40 mL, IntraVENous, 2 times per day  sodium chloride flush 0.9 % injection 5-40 mL, 5-40 mL, IntraVENous, PRN  0.9 % sodium chloride infusion, 25 mL, IntraVENous, PRN  enoxaparin (LOVENOX) injection 30 mg, 30 mg, SubCUTAneous, BID  ondansetron (ZOFRAN-ODT) disintegrating tablet 4 mg, 4 mg, Oral, Q8H PRN **OR** ondansetron (ZOFRAN) injection 4 mg, 4 mg, IntraVENous, Q6H PRN  polyethylene glycol (GLYCOLAX) packet 17 g, 17 g, Oral, Daily PRN  acetaminophen (TYLENOL) tablet 650 mg, 650 mg, Oral, Q6H PRN **OR** acetaminophen (TYLENOL) suppository 650 mg, 650 mg, Rectal, Q6H PRN  potassium chloride (KLOR-CON M) extended release tablet 40 mEq, 40 mEq, Oral, PRN **OR** potassium bicarb-citric acid (EFFER-K) effervescent tablet 40 mEq, 40 mEq, Oral, PRN **OR** potassium chloride 10 mEq/100 mL IVPB (Peripheral Line), 10 mEq, IntraVENous, PRN  magnesium sulfate 2000 mg in 50 mL IVPB premix, 2,000 mg, IntraVENous, PRN  influenza quadrivalent split vaccine (FLUZONE;FLUARIX;FLULAVAL;AFLURIA) injection 0.5 mL, 0.5 mL, IntraMUSCular, Prior to discharge  vancomycin (VANCOCIN) intermittent dosing (placeholder), , Other, RX Placeholder  metroNIDAZOLE (FLAGYL) tablet 500 mg, 500 mg, Oral, 3 times per day     This is day 3 of vancomycin and cefepime, day 2 of Flagyl. Allergies: Patient has no known allergies. Pertinent items from the review of systems are discussed in the HPI; the remainder of the ROS was reviewed and is negative.      Objective:     Vital signs over the last 24 hours:  Temp  Av.4 °F (36.9 °C)  Min: 97.7 °F (36.5 °C)  Max: 99.4 °F (37.4 °C)  Pulse  Av  Min: 63  Max: 91  Systolic (28BTK), XKN:346 , Min:112 , FHP:310   Diastolic (58IYU), BP, Min:67, Max:79  Resp  Av  Min: 16  Max: 18  SpO2  Av.5 %  Min: 97 %  Max: 99 %    Constitutional:  well-developed, well-nourished, NAD, conversant, overweight  Psychiatric:  oriented to person, place and time; mood and affect appropriate for the situation   Eyes:  pupils equal, round and reactive to light; sclerae anicteric, conjunctivae not pale  ENT:  atraumatic; oral mucosa moist, no thrush or ulcers  Cardiovascular:  heart regular, no gallop, no murmur; no lower extremity edema; no IV phlebitis  GI:  abdomen soft, NT, ND, normal bowel sounds, no palpable masses or organomegaly  Musculoskeletal:  no clubbing, cyanosis or petechiae; extremities with no gross effusions, joint misalignment or acute arthritis - no clear acute arthritis or bursitis at left hip  Skin: warm, dry, normal turgor; no rash; left ischial pressure ulcer a bit less inflamed, a bit more red tissue, still some sloughy fat necrosis and fascial exposure, undermining, not as much purulent drainage, definitely not as much malodor, no exposed bone.  ______________________________    Recent Labs     12/07/21  0512 12/06/21  1631   WBC 11.5* 11.5*   HGB 13.6 15.5   HCT 42.3 47.6   MCV 96.8 96.4    414     Lab Results   Component Value Date    CREATININE 0.9 12/07/2021     Lab Results   Component Value Date    LABALBU 3.2 (L) 12/07/2021     Lab Results   Component Value Date    ALT 51 (H) 12/07/2021    AST 28 12/07/2021    ALKPHOS 119 12/07/2021    BILITOT 0.4 12/07/2021      Other recent pertinent labs: Anion gap 13. WBC diff with mild neutrophilia.  ______________________________    Recent pertinent micro results:  BCx (-) x 2 sets. WCx with Group C Strep and mixed enteric rdaha.   ______________________________    Recent imaging results (last 7 days):     CT PELVIS W CONTRAST Additional Contrast? None    Result Date: 12/6/2021  Soft tissue ulceration with a 4 cm fluid collection containing gas in the subcutaneous soft tissues adjacent to the ulceration suggesting an abscess. Assessment:     Patient Active Problem List   Diagnosis Code    T5 spinal cord injury ([address] S24.102A    Neurogenic bladder N31.9    Neurogenic bowel K59.2    Depression F32. A    Paraplegia ([address] G82.20    Pressure injury of skin with infection L89.90, L08.9    GERD (gastroesophageal reflux disease) K21.9    Cellulitis and abscess of buttock L02.31, L03.317     Assessment of today's active condition(s):      --          Background of spinal cord injury, paraplegia, neurogenic bowel and bladder.     --          One prior stage 2 buttock-ischial pressure ulcer a few years ago.     --          New stage 4 pressure ulcer of the left ischial region now, with some mild cellulitis, abscess, at least a risk of osteomyelitis.  Not severely systemically ill, but a good deal of local

## 2021-12-08 NOTE — FLOWSHEET NOTE
Cleansed wound, applied new drsg to left buttock. 12/08/21 0420   Cardiac Monitor   Telemetry Monitor On No   Wound 12/06/21 Buttocks Left; Lateral   Date First Assessed/Time First Assessed: 12/06/21 2320   Present on Hospital Admission: Yes  Primary Wound Type: Pressure Injury  Location: Buttocks  Wound Location Orientation: Left; Lateral   Wound Etiology Pressure Stage  3  (post debridement)   Dressing Status New dressing applied; Clean; Dry; Intact   Wound Cleansed Cleansed with saline   Dressing/Treatment Gauze dressing/dressing sponge; Alginate with Ag;  Foam  (triad cream)   Wound Assessment Exposed structure muscle   Drainage Amount Moderate   Drainage Description Sanguinous   Christina-wound Assessment Non-blanchable erythema   Margins Defined edges

## 2021-12-08 NOTE — CARE COORDINATION
Critical access hospital  Received referral regarding [facility], [facility]. services with dressing change/wound care from Shawna Clayton. Sent request to [facility] for [facility] coverage with a possibility of dc home in next 1-2 days. Critical access hospital is able to service for [facility], [facility].. Plan for [facility] 12/11. Spoke with June Meléndez in follow up.       Electronically signed by Alex Sheldon RN on 12/8/2021 at 12:32 PM

## 2021-12-08 NOTE — PROGRESS NOTES
Progress Note    Admit Date:  12/6/2021    Subjective:  Mr. Tiffany Peng has a history of paraplegia from a back dating 2017 remarkable fractures in the thoracic spinal cord injury. He had a history of a lot of pressure ulcers. He is admitted to hospital with a left ischial pressure ulcer. This is probably been going on for a couple of months. They are not a subjective complaint of fever headache and drainage of the pressure sore. He had mild leukocytosis. Physical exam the ED showed necrosis and infection. He was admitted to hospital IV antibiotics. Today he has been seen by wound care/infectious disease and a bedside debridement was done. 12/8- no fever. No new issues. Culture showed Beta Strep Group C. Objective:   /79   Pulse 64   Temp 97.7 °F (36.5 °C) (Oral)   Resp 18   Ht 6' 1\" (1.854 m)   Wt 225 lb 9.6 oz (102.3 kg)   SpO2 99%   BMI 29.76 kg/m²          Intake/Output Summary (Last 24 hours) at 12/8/2021 1201  Last data filed at 12/8/2021 0940  Gross per 24 hour   Intake 2012.02 ml   Output 1575 ml   Net 437.02 ml       Physical Exam:    General appearance: alert, appears stated age and cooperative  Head: Normocephalic, without obvious abnormality, atraumatic  Eyes: conjunctivae/corneas clear. PERRL, EOM's intact.   Neck: no adenopathy, no carotid bruit, no JVD, supple, symmetrical, trachea midline and thyroid not enlarged, symmetric, no tenderness/mass/nodules  Lungs: clear to auscultation bilaterally  Heart: regular rate and rhythm, S1, S2 normal, no murmur, click, rub or gallop  Detail exam of decubitus ulcer per ID/wound care I did not examine the area    Scheduled Meds:   vancomycin  1,500 mg IntraVENous Once    cefepime  2,000 mg IntraVENous Q12H    baclofen  20 mg Oral Nightly    escitalopram  10 mg Oral Daily    oxybutynin  5 mg Oral TID    sodium chloride flush  5-40 mL IntraVENous 2 times per day    enoxaparin  30 mg SubCUTAneous BID    influenza virus vaccine  0.5 mL IntraMUSCular Prior to discharge    vancomycin (VANCOCIN) intermittent dosing (placeholder)   Other RX Placeholder    metroNIDAZOLE  500 mg Oral 3 times per day       Continuous Infusions:   sodium chloride 75 mL/hr at 12/08/21 0638    sodium chloride         PRN Meds:  sodium chloride flush, sodium chloride, ondansetron **OR** ondansetron, polyethylene glycol, acetaminophen **OR** acetaminophen, potassium chloride **OR** potassium alternative oral replacement **OR** potassium chloride, magnesium sulfate      Data:  CBC:   Recent Labs     12/06/21  1631 12/07/21  0512   WBC 11.5* 11.5*   HGB 15.5 13.6   HCT 47.6 42.3   MCV 96.4 96.8    386     BMP:   Recent Labs     12/06/21  1631 12/07/21  0512   * 137   K 4.0 4.2   CL 97* 100   CO2 27 24   BUN 12 12   CREATININE 0.8* 0.9     LIVER PROFILE:   Recent Labs     12/06/21  1631 12/07/21  0512   AST 34 28   ALT 63* 51*   BILITOT 0.3 0.4   ALKPHOS 147* 119     PT/INR:   Recent Labs     12/07/21  0512   PROTIME 18.0*   INR 1.56*     Cultures:    0 Result Notes    Component 12/6/21 1817 Resulting Agency   WOUND/ABSCESS  Abnormal   Mixed enteric radha  Light growth   No further workup   [facility]   Gram Stain Result  Abnormal   1+ WBC's (Polymorphonuclear)   1+ Gram positive cocci   1+ Gram positive rods   1+ Gram negative rods   [address] [facility]   WOUND/ABSCESS Moderate growth   Susceptibility testing of penicillin and other beta lactams is   not necessary for beta hemolytic Streptococci since resistant   strains have not been identified. (CLSI M100)              Results for Birtha Factor (MRN [id number]) as of 12/7/2021 12:01   Ref.  Range 12/6/2021 16:52   SARS-CoV-2, NAAT Latest Ref Range: Not Detected  Not Detected     CT PELVIS W CONTRAST Additional Contrast? None   Final Result   Soft tissue ulceration with a 4 cm fluid collection containing gas in the subcutaneous soft tissues adjacent to the ulceration suggesting an abscess. Assessment:  Principal Problem:    Cellulitis and abscess of buttock  Active Problems:    T5 spinal cord injury ([address]    Neurogenic bladder    Neurogenic bowel    Depression    Paraplegia (HCC)    Pressure injury of skin with infection    GERD (gastroesophageal reflux disease)  Resolved Problems:    * No resolved hospital problems. *      Plan:    #Cellulitis and abscess of the back. Admitted to hospital.  Started on IV vancomycin and cefepime. Oral Flagyl added by ID. Bedside debridement done by ID. Culture noted. IV antibiotics per ID. Daily dressings per ID. #Paraplegia from a prior injury. #Neurogenic bowel and bladder. Nba Devin #GERD stable. #Depression at baseline. Lovenox for DVT prophylaxis.     Laci Mckeon MD, MD 12/8/2021 12:01 PM

## 2021-12-08 NOTE — PROGRESS NOTES
Physician Progress Note      PATIENT:               Petar Santamaria  CSN #:                  [id number]  :                       1970  ADMIT DATE:       2021 1:33 PM  [address] [place] DATE:  RESPONDING  PROVIDER #:        Enedelia Santos MD          QUERY TEXT:    Patient admitted with Unstageable Pressure ulcer to left ischial region. Per   progress note dates  documentation of debridement. To accurately reflect   the procedure performed please document if debridement was excisional or   non-excisional:    The medical record reflects the following:  Risk Factors: paraplegic, pressure ulcer  Clinical Indicators: per PN: Using a scissors and forceps, I sharply debrided   the left ischium ulcer(s) down through and including the removal of   muscle/fascia. Treatment: beside debridement    Thank you for your assistance,  Bandar Yin RN,BSN,CCDS,CRCR  Options provided:  -- Non-excisional debridement of muscle/fascia  -- Excisional debridement of muscle/fascia  -- Other - I will add my own diagnosis  -- Disagree - Not applicable / Not valid  -- Disagree - Clinically unable to determine / Unknown  -- Refer to Clinical Documentation Reviewer    PROVIDER RESPONSE TEXT:    Excisional debridement of muscle/fascia of left ischial region was performed   during procedure on .     Query created by: Ophelia Canales on 2021 12:15 PM      Electronically signed by:  Eneedlia Santos MD 2021 12:54 PM

## 2021-12-08 NOTE — DISCHARGE INSTR - COC
Continuity of Care Form    Patient Name: Radha Olivia   :  1970  MRN:  [id number]    Admit date:  2021  Discharge date:  21    Code Status Order: Full Code   Advance Directives:      Admitting Physician:  Genoveva Gómez MD  PCP: Lucas Swift MD    Discharging Nurse: [facility] Unit/Room#: 0207/0207-02  Discharging Unit Phone Number: ***    Emergency Contact:   Extended Emergency Contact Information  Primary Emergency Contact: Cherie Velasquezyn TERESITA  Address: [address] [facility] [address] [facility]           [address] [facility] [address] [facility] Phone: [phone]  Mobile Phone: [phone]  Relation: Parent  Secondary Emergency Contact: Sharan Velasquez  Address: [address] [facility]           [address], [address] [facility] [address] [facility] Phone: [phone]  Relation: Parent    Past Surgical History:  Past Surgical History:   Procedure Laterality Date    BACK SURGERY      spine, collar bone, rib fx repairs    FRACTURE SURGERY         Immunization History:   Immunization History   Administered Date(s) Administered    Influenza Virus Vaccine 2017    Influenza, MDCK Quadv, IM, PF (Flucelvax 2 yrs and older) 2017    Influenza, Quadv, IM, (6 mo and older Fluzone, Flulaval, Fluarix and 3 yrs and older Afluria) 2018, 10/29/2019    Tdap (Boostrix, Adacel) 2014       Active Problems:  Patient Active Problem List   Diagnosis Code    T5 spinal cord injury ([address] S24.102A    Neurogenic bladder N31.9    Neurogenic bowel K59.2    Depression F32. A    Paraplegia ([facility]) G82.20    Pressure injury of skin with infection L89.90, L08.9    GERD (gastroesophageal reflux disease) K21.9    Cellulitis and abscess of buttock L02.31, L03.317       Isolation/Infection:   Isolation            No Isolation          Patient Infection Status       None to display            Nurse Assessment:  Last Vital Signs: /68   Pulse 91   Temp 98.3 °F (36.8 °C) (Oral)   Resp 18   Ht 6' 1\" (1.854 m)   Wt 225 lb 9.6 oz (102.3 kg)   SpO2 97%   BMI 29.76 kg/m²     Last documented pain score (0-10 scale): Pain Level: 0  Last Weight:   Wt Readings from Last 1 Encounters:   12/06/21 225 lb 9.6 oz (102.3 kg)     Mental Status:  {IP PT MENTAL STATUS:08802}    IV Access:  { LILIANA IV ACCESS:959760379}    Nursing Mobility/ADLs:  Walking   {CHP DME ZLLE:444563593}  Transfer  {CHP DME GKBT:606282257}  Bathing  {CHP DME UFDW:487725921}  Dressing  {CHP DME QMUB:358418109}  Toileting  {CHP DME OVZH:395366049}  Feeding  {CHP DME WKZM:477782279}  Med Admin  {P DME CLLO:326620550}  Med Delivery   { LILIANA MED Delivery:464679374}    Wound Care Documentation and Therapy:  Wound 09/27/18 #2 Left Gluteal Fold, Pressure Ulcer, Stage 2, onset 9/19/18, Pressure (Active)   Number of days: 1168       Wound 12/06/21 Buttocks Left; Lateral (Active)   Wound Image   12/06/21 2349   Wound Etiology Pressure Stage  3 12/08/21 0730   Dressing Status Dry; Intact; New drainage noted 12/08/21 0730   Wound Cleansed Cleansed with saline 12/08/21 0420   Dressing/Treatment Gauze dressing/dressing sponge; Alginate with Ag; Foam 12/08/21 0420   Wound Length (cm) 5.3 cm 12/06/21 2349   Wound Width (cm) 3.5 cm 12/06/21 2349   Wound Depth (cm) 2.4 cm 12/06/21 2349   Wound Surface Area (cm^2) 18.55 cm^2 12/06/21 2349   Wound Volume (cm^3) 44.52 cm^3 12/06/21 2349   Wound Assessment Exposed structure muscle 12/08/21 0730   Drainage Amount Small 12/08/21 0730   Drainage Description Sanguinous 12/08/21 0730   Odor Mild 12/06/21 2349   Christina-wound Assessment Non-blanchable erythema 12/08/21 0730   Margins Defined edges 12/08/21 0730   Number of days: 1        Elimination:  Continence:    Bowel: {YES / PC:29554}  Bladder: {YES / ML:21215}  Urinary Catheter: {Urinary Catheter:641078410}   Colostomy/Ileostomy/Ileal Conduit: {YES / CE:70725}       Date of Last BM: ***    Intake/Output Summary (Last 24 hours) at 12/8/2021 12/11  Recommendations for wound care to left ischium: cleanse with saline or wound cleanser, apply Triad Dressing to wound edges and any discolored tissue, gently pack the wound with silver alginate, add 4x4 gauze to fill in any additional wound volume if needed, then apply an absorptive cover dressing; change once daily. Be sure to stay off the wound as much as possible (spend more time in bed for now and less time seated if possible; when seated, be sure to use offloading cushion, and change positions frequently). Be sure to get plenty of protein in your diet. Electronically signed by Lázaro Arriaza MD on 12/8/2021 at 6:22 PM.     Physician Certification: I certify the above information and transfer of Ki Pace  is necessary for the continuing treatment of the diagnosis listed and that he requires [address] for less 30 days.      Update Admission H&P: No change in H&P    PHYSICIAN SIGNATURE:  Electronically signed by HOLLIS Xie RN on 12/9/21 at 10:34 AM EST

## 2021-12-08 NOTE — FLOWSHEET NOTE
12/08/21 0715   Handoff   Communication Given Shift Handoff   Oncoming Nurse/Offgoing Nurse Jose E/Rylie   Handoff Communication Face to Face;  At bedside   Time Handoff Given 2950   End of Shift Check Performed Yes

## 2021-12-08 NOTE — PROGRESS NOTES
Pharmacy Vancomycin Consult     Vancomycin Day: 2  Current Dosing: Pulse  Current indication: SSTI    Temp max:      Recent Labs     12/06/21  1631 12/07/21  0512   BUN 12 12   CREATININE 0.8* 0.9   WBC 11.5* 11.5*       Intake/Output Summary (Last 24 hours) at 12/8/2021 0743  Last data filed at 12/8/2021 0038  Gross per 24 hour   Intake 2012.02 ml   Output 1575 ml   Net 437.02 ml     Culture Date      Source                       Results  Wound: Mixed growth    Ht Readings from Last 1 Encounters:   12/06/21 6' 1\" (1.854 m)        Wt Readings from Last 1 Encounters:   12/06/21 225 lb 9.6 oz (102.3 kg)       Body mass index is 29.76 kg/m². Estimated Creatinine Clearance: 122 mL/min (based on SCr of 0.9 mg/dL).     Random, 6.9    Assessment/Plan:  Vanc 1500mg now, level 12 hours post. May tolerate 12 hour interval  Laz Ashley Pharm D 12/8/20217:45 AM  .

## 2021-12-09 VITALS
DIASTOLIC BLOOD PRESSURE: 71 MMHG | SYSTOLIC BLOOD PRESSURE: 144 MMHG | WEIGHT: 225.6 LBS | HEIGHT: 73 IN | BODY MASS INDEX: 29.9 KG/M2 | HEART RATE: 62 BPM | RESPIRATION RATE: 16 BRPM | OXYGEN SATURATION: 96 % | TEMPERATURE: 98.4 F

## 2021-12-09 PROCEDURE — G0008 ADMIN INFLUENZA VIRUS VAC: HCPCS | Performed by: INTERNAL MEDICINE

## 2021-12-09 PROCEDURE — 90686 IIV4 VACC NO PRSV 0.5 ML IM: CPT | Performed by: INTERNAL MEDICINE

## 2021-12-09 PROCEDURE — 6360000002 HC RX W HCPCS: Performed by: INTERNAL MEDICINE

## 2021-12-09 PROCEDURE — 2580000003 HC RX 258: Performed by: INTERNAL MEDICINE

## 2021-12-09 PROCEDURE — 6370000000 HC RX 637 (ALT 250 FOR IP): Performed by: INTERNAL MEDICINE

## 2021-12-09 PROCEDURE — 99238 HOSP IP/OBS DSCHRG MGMT 30/<: CPT | Performed by: INTERNAL MEDICINE

## 2021-12-09 RX ORDER — BISACODYL 10 MG
10 SUPPOSITORY, RECTAL RECTAL DAILY PRN
Status: DISCONTINUED | OUTPATIENT
Start: 2021-12-09 | End: 2021-12-09 | Stop reason: HOSPADM

## 2021-12-09 RX ADMIN — METRONIDAZOLE 500 MG: 250 TABLET ORAL at 13:21

## 2021-12-09 RX ADMIN — CEFEPIME 2000 MG: 2 INJECTION, POWDER, FOR SOLUTION INTRAVENOUS at 00:28

## 2021-12-09 RX ADMIN — INFLUENZA A VIRUS A/VICTORIA/2570/2019 IVR-215 (H1N1) ANTIGEN (PROPIOLACTONE INACTIVATED), INFLUENZA A VIRUS A/CAMBODIA/E0826360/2020 IVR-224 (H3N2) ANTIGEN (PROPIOLACTONE INACTIVATED), INFLUENZA B VIRUS B/VICTORIA/705/2018 BVR-11 ANTIGEN (PROPIOLACTONE INACTIVATED), INFLUENZA B VIRUS B/PHUKET/3073/2013 BVR-1B ANTIGEN (PROPIOLACTONE INACTIVATED) 0.5 ML: 15; 15; 15; 15 INJECTION, SUSPENSION INTRAMUSCULAR at 13:37

## 2021-12-09 RX ADMIN — OXYBUTYNIN CHLORIDE 5 MG: 5 TABLET ORAL at 13:21

## 2021-12-09 RX ADMIN — POLYETHYLENE GLYCOL (3350) 17 G: 17 POWDER, FOR SOLUTION ORAL at 10:36

## 2021-12-09 RX ADMIN — RIVAROXABAN 20 MG: 20 TABLET, FILM COATED ORAL at 10:22

## 2021-12-09 RX ADMIN — ESCITALOPRAM OXALATE 10 MG: 10 TABLET ORAL at 10:22

## 2021-12-09 RX ADMIN — BISACODYL 10 MG: 10 SUPPOSITORY RECTAL at 13:21

## 2021-12-09 RX ADMIN — OXYBUTYNIN CHLORIDE 5 MG: 5 TABLET ORAL at 10:22

## 2021-12-09 RX ADMIN — METRONIDAZOLE 500 MG: 250 TABLET ORAL at 05:00

## 2021-12-09 ASSESSMENT — PAIN SCALES - GENERAL
PAINLEVEL_OUTOF10: 0
PAINLEVEL_OUTOF10: 0

## 2021-12-09 NOTE — PROGRESS NOTES
BP (!) 144/71   Pulse 62   Temp 98.4 °F (36.9 °C) (Oral)   Resp 16   Ht 6' 1\" (1.854 m)   Wt 225 lb 9.6 oz (102.3 kg)   SpO2 96%   BMI 29.76 kg/m²     Assessment complete. Meds passed. Pt denies needs at this time. Pt to be d.c rn to change dressing. Hayes removed. Bedside Mobility Assessment Tool (BMAT):     Assessment Level 1- Sit and Shake    1. From a semi-reclined position, ask patient to sit up and rotate to a seated position at the side of the bed. Can use the bedrail. 2. Ask patient to reach out and grab your hand and shake making sure patient reaches across his/her midline. Fail- Patient is unable to perform tasks, patient is MOBILITY LEVEL 1. Assessment Level 2- Stretch and Point   1. With patient in seated position at the side of the bed, have patient place both feet on the floor (or stool) with knees no higher than hips. 2. Ask patient to stretch one leg and straighten the knee, then bend the ankle/flex and point the toes. If appropriate, repeat with the other leg. Fail- Patient is unable to complete task. Patient is MOBILITY LEVEL 2. Assessment Level 3- Stand   1. Ask patient to elevate off the bed or chair (seated to standing) using an assistive device (cane, bedrail). 2. Patient should be able to raise buttocks off be and hold for a count of five. May repeat once. Fail- Patient unable to demonstrate standing stability. Patient is MOBILITY LEVEL 3. Assessment Level 4- Walk   1. Ask patient to march in place at bedside. 2. Then ask patient to advance step and return each foot. Some medical conditions may render a patient from stepping backwards, use your best clinical judgement. Fail- Patient not able to complete tasks OR requires use of assistive device. Patient is MOBILITY LEVEL 3.        Mobility Level- 1

## 2021-12-09 NOTE — CARE COORDINATION
DISCHARGE ORDER  Date/Time 2021 10:43 AM  Completed by: Vandana Andres RN, Case Management    Patient Name: Eli Horn      : 1970  Admitting Diagnosis: Decubitus ulcer, unstageable with infection ([facility] [address] [L89.95, L08.9]  Infected decubitus ulcer, unspecified ulcer stage [L89.90, L08.9]      Admit order Date and Status:INPT 21  (verify MD's last order for status of admission)      Noted discharge order. If applicable PT/OT recommendation at Discharge: n/a  DME recommendation by PT/OT:n/a  Confirmed discharge plan : Yes  with whom__with pt_____________  If pt confirmed DC plan does family need to be contacted by CM Yes if yes who_pt father aware of discharge home today_____  Discharge Plan: Pt cont to plan to return home at discharge with [facility] to follow for wound care. [facility] Dr. Abida Trevino next week. JEANNETTE spoke with Karina Cramer with [facility] who is aware of pt discharge today. JEANNETTE spoke with pt nurse, Sherryle Decent, re [facility] request to send pt home with wound care supplies for 5 days. Karly to obtain orders and needed documentation from Epic. Reviewed chart. Role of discharge planner explained and patient verbalized understanding. Discharge order is noted. Has Home O2 in place on admit:  No  Informed of need to bring portable home O2 tank on day of discharge for nursing to connect prior to leaving:   Not Indicated  Verbalized agreement/Understanding:   Not Indicated  Pt is being d/c'd to home today. Pt's O2 sats are 96% on roomair. Discharge timeout done with Jose Carlos Rainey. All discharge needs and concerns addressed.

## 2021-12-09 NOTE — PROGRESS NOTES
Comprehensive Nutrition Assessment    Type and Reason for Visit:  Initial, Wound, Positive Nutrition Screen    Nutrition Recommendations/Plan:   1. Continue Regular diet  2. Added ensure HP TID     Nutrition Assessment:    Pt. nutritionally compromised AEB pt admitted with a New stage 4 pressure ulcer of the left ischial region now, with some mild cellulitis, abscess. At risk for further nutrition compromise r/t he is paraplegic and has difficulty  off loading presure . Will add ensure HP to melas; provided written material on suggestion for HP supps at home recommend 30g/serving, verbally counseled him on the importance of increasing protein to promote healing of pressure area     Malnutrition Assessment:  Malnutrition Status:  No malnutrition    Context:  Acute Illness     Findings of the 6 clinical characteristics of malnutrition:  Energy Intake:  No significant decrease in energy intake  Weight Loss:  No significant weight loss     Body Fat Loss:  No significant body fat loss Orbital   Muscle Mass Loss:  No significant muscle mass loss Temples (temporalis)  Fluid Accumulation:  Unable to assess     Strength:  Not Performed    Estimated Daily Nutrient Needs:  Energy (kcal):  4537-4570 based ~ 15-18 kcal/kg cbw; Weight Used for Energy Requirements:  Current     Protein (g):  133-150 based ~ 1.6-1.8 gr/kg cbw; Weight Used for Protein Requirements:  Ideal        Fluid (ml/day):  2406-7204; Method Used for Fluid Requirements:  1 ml/kcal      Nutrition Related Findings:  middled aged male lying in bed having dinner; requested assitance to adjust him up in the bed so he could sit up; appetiet is very good; New stage 4 pressure ulcer of the left ischial region now, with some mild cellulitis, abscess      Wounds:  Pressure Injury, Stage IV       Current Nutrition Therapies:    ADULT DIET;  Regular    Anthropometric Measures:  · Height: 6' 1\" (185.4 cm)  · Current Body Weight: 225 lb (102.1 kg)   · Admission Body Weight: 225 lb (102.1 kg)    · Usual Body Weight: 225 lb (102.1 kg)     · Ideal Body Weight: 184 lbs; % Ideal Body Weight 122.3 %   · BMI: 29.7  · Adjusted Body Weight: 241.9; Paraplegia   · Adjusted BMI: 31.9    · BMI Categories: Obese Class 1 (BMI 30.0-34. 9)       Nutrition Diagnosis:   · Increased nutrient needs related to inadequate protein-energy intake as evidenced by wounds      Nutrition Interventions:   Food and/or Nutrient Delivery:  Continue Current Diet, Start Oral Nutrition Supplement  Nutrition Education/Counseling:  No recommendation at this time   Coordination of Nutrition Care:  Continue to monitor while inpatient    Goals:  pt will increse his intake of protein by conuming > 75% of meals and 100% of ensure HP to promote adequate healing       Nutrition Monitoring and Evaluation:   Behavioral-Environmental Outcomes:  None Identified   Food/Nutrient Intake Outcomes:  Food and Nutrient Intake, Supplement Intake  Physical Signs/Symptoms Outcomes:  Weight, Skin, Biochemical Data     Discharge Planning:     Too soon to determine     Electronically signed by Altagracia Salazar RD, LD on 12/8/21 at 7:01 PM EST    Contact: [phone]

## 2021-12-09 NOTE — FLOWSHEET NOTE
12/08/21 2050   Vital Signs   Temp 98.7 °F (37.1 °C)   Temp Source Oral   Pulse 61   Heart Rate Source Monitor   Resp 18   BP (!) 142/76   BP Location Left upper arm   Patient Position Semi fowlers   Level of Consciousness Alert (0)   MEWS Score 1   Patient Currently in Pain No   Pain Assessment   Pain Assessment 0-10   Pain Level 0   Oxygen Therapy   SpO2 97 %   O2 Device None (Room air)   PM assessment completed, see flow sheet. Pt is alert and oriented. Vital signs are WNL. Respirations are even & easy. No complaints voiced. Pt denies needs at this time. SR up x 2, and bed in low position. Call light is within reach. Bedside Mobility Assessment Tool (BMAT):     Assessment Level 1- Sit and Shake    1. From a semi-reclined position, ask patient to sit up and rotate to a seated position at the side of the bed. Can use the bedrail. 2. Ask patient to reach out and grab your hand and shake making sure patient reaches across his/her midline. Fail- Patient is unable to perform tasks, patient is MOBILITY LEVEL 1. Assessment Level 2- Stretch and Point   1. With patient in seated position at the side of the bed, have patient place both feet on the floor (or stool) with knees no higher than hips. 2. Ask patient to stretch one leg and straighten the knee, then bend the ankle/flex and point the toes. If appropriate, repeat with the other leg. Fail- Patient is unable to complete task. Patient is MOBILITY LEVEL 2. Assessment Level 3- Stand   1. Ask patient to elevate off the bed or chair (seated to standing) using an assistive device (cane, bedrail). 2. Patient should be able to raise buttocks off be and hold for a count of five. May repeat once. Fail- Patient unable to demonstrate standing stability. Patient is MOBILITY LEVEL 3. Assessment Level 4- Walk   1. Ask patient to march in place at bedside. 2. Then ask patient to advance step and return each foot.  Some medical conditions may render a patient from stepping backwards, use your best clinical judgement. Fail- Patient not able to complete tasks OR requires use of assistive device. Patient is MOBILITY LEVEL 3. Mobility Level- 1    Patient is not able to demonstrate the ability to move from a reclining position to an upright position within the recliner due to patient is paraplegic. Karuna Lawrence

## 2021-12-09 NOTE — CARE COORDINATION
[facility]  Notified [facility] of pt plan to discharge home today. Bedside nurse to send home dressing supplies for 5 + days. Dressing change order will need to be in physician section of [facility] for a signed order. Liaison to follow up with pt prior to discharge. Spoke with June MACHADO in follow up. CM to inform bedside nurse about need for dressing supplies at [place]. CM aware of [facility] 12/11. Spoke with pt's father, Ame Arvizu, [facility]. Agreeable to [facility] for SN. Verified demographics. Aware bedside nurse to send home dressing supplies. [facility] with [facility] planned for 12/11. Father does have some concerns with daily dressing change. States he and his wife will be at home with pt and additional family members will be available at times, however, he does have concern with daily dressing changes. Father states they do not have any supplies at home. Advised family to follow up with bedside nurse regarding teaching with dressing change prior to discharge and dressing supplies. [facility], [facility]. nurse to assist with ordering supplies at home. Spoke with June MACHADO. Aware of above concerns. Faxed referral with orders to [facility].     Electronically signed by Bill Cunningham RN on 12/9/2021 at 9:19 AM

## 2021-12-09 NOTE — DISCHARGE SUMMARY
Name:  Rita Yoder  Room:  0207/0207-02  MRN:    [id number]    Discharge Summary      This discharge summary is in conjunction with a complete physical exam done on the day of discharge. Discharging Physician: Dr. Srini Woodward MD    Admit: 12/6/2021  Discharge:  12/9/2021    HPI taken from admission H&P:      The patient is a 46 y.o. male with PMH below, presents with would to L buttock draining blood and foul smelling fluid. He reports the wound to this area has been worsening ove rthe last 2 mos. He has hx of paraplegia below ~T10, neurogenic bladder 2/2 remote spinal injury. He is WC bound at baseline. He denies pain to the are bc of his hx of paraplegia. Diagnoses this Admission and Hospital Course     #Cellulitis and abscess of the left ischial region   -Admitted to hospital.  Started on IV vancomycin and cefepime. Oral Flagyl added by ID. -Bedside debridement done by ID. Cultures with strep bacteria  -Improved fevers , wbc normal   -BP stable  -Daily dressings done per ID.    -Pressure off loading   --Dc today on oral augmentin and follow up wound care     #Paraplegia from a prior injury.   -Pt is bed bound and uses electric wheelchair at home   -Continue home baclofen as needed for spasticity     #Neurogenic bowel and bladder.  -Does intermittent catheterizations at home  -Hayes placed here and will be removed at dc      #GERD   -stable.     #Depression at baseline.   -On lexapro     # hx of DVT and PE   -on xarelto    Procedures (Please Review Full Report for Details)  Bedside wound debridement    Consults    Infectious Disease    Physical Exam at Discharge:    BP (!) 144/71   Pulse 62   Temp 98.4 °F (36.9 °C) (Oral)   Resp 16   Ht 6' 1\" (1.854 m)   Wt 225 lb 9.6 oz (102.3 kg)   SpO2 96%   BMI 29.76 kg/m²     General appearance: middle aged male, bed bound  alert, appears stated age and cooperative  Head: Normocephalic, without obvious abnormality, atraumatic  Eyes: Ref Range: Not Detected           RADIOLOGY  CT PELVIS W CONTRAST Additional Contrast? None   Final Result   Soft tissue ulceration with a 4 cm fluid collection containing gas in the   subcutaneous soft tissues adjacent to the ulceration suggesting an abscess. Discharge Medications     Medication List      START taking these medications    amoxicillin-clavulanate 875-125 MG per tablet  Commonly known as: AUGMENTIN  Take 1 tablet by mouth 2 times daily for 7 days  Notes to patient: Augmentin  Use: treat bacterial infections  Side effects:rash; hives; itching; shortness of breath; wheezing; cough        CONTINUE taking these medications    * baclofen 10 MG tablet  Commonly known as: LIORESAL  Take 1 tablet by mouth 2 times daily     * baclofen 20 MG tablet  Commonly known as: LIORESAL  TAKE 1 TABLET BY MOUTH EVERY NIGHT AT BEDTIME     bisacodyl 10 MG suppository  Commonly known as: DULCOLAX  USe one daily as needed     * Catheters Kit  1 each by Does not apply route every 3 hours     * Bardex Lubricath Catheter Misc  Please dispense lubrication for use of catheter.      * Depend Real-Fit Briefs for Men Misc  1 each by Does not apply route See Admin Instructions Use for urinary and stool incontinence     * Disposable Liners Misc  Dispense 1 box per month of Disposable Bed pads/chucks     Disposable Gloves Misc  Size large dispense 2 boxes per month     escitalopram 10 MG tablet  Commonly known as: LEXAPRO  Take 1 tablet by mouth daily     oxybutynin 5 MG tablet  Commonly known as: DITROPAN  Take 1 tablet by mouth 3 times daily     polyethylene glycol 17 GM/SCOOP powder  Commonly known as: GLYCOLAX  Take 17 grams mixed in liquid by mouth daily     rivaroxaban 20 MG Tabs tablet  Commonly known as: Xarelto  TAKE 1 TABLET BY MOUTH DAILY     senna-docusate 8.6-50 MG per tablet  Commonly known as: DOK Plus  TAKE 1 TABLET BY MOUTH EVERY NIGHT AT BEDTIME         * This list has 6 medication(s) that are the same as other medications prescribed for you. Read the directions carefully, and ask your doctor or other care provider to review them with you. Where to Get Your Medications      These medications were sent to [address] [place] [address] [phone] - F [phone]  [address] [place] [address] Champ Zarate, [address] [place] [address]    Phone: [phone]   · amoxicillin-clavulanate 875-125 MG per tablet       Discharged in stable condition to home    Follow Up:   Follow up with PCP in 1 week    Ivy Adam MD, 1/4/2022 8:16 AM

## 2021-12-09 NOTE — PLAN OF CARE
Problem: Falls - Risk of:  Goal: Will remain free from falls  Description: Will remain free from falls  Outcome: Completed  Goal: Absence of physical injury  Description: Absence of physical injury  Outcome: Completed     Problem: Skin Integrity:  Goal: Will show no infection signs and symptoms  Description: Will show no infection signs and symptoms  Outcome: Completed  Goal: Absence of new skin breakdown  Description: Absence of new skin breakdown  Outcome: Completed     Problem: Infection:  Goal: Will remain free from infection  Description: Will remain free from infection  Outcome: Completed     Problem: Safety:  Goal: Free from accidental physical injury  Description: Free from accidental physical injury  Outcome: Completed  Goal: Free from intentional harm  Description: Free from intentional harm  Outcome: Completed     Problem: Daily Care:  Goal: Daily care needs are met  Description: Daily care needs are met  Outcome: Completed     Problem: Pain:  Goal: Patient's pain/discomfort is manageable  Description: Patient's pain/discomfort is manageable  Outcome: Completed     Problem: Skin Integrity:  Goal: Skin integrity will stabilize  Description: Skin integrity will stabilize  Outcome: Completed     Problem: Discharge Planning:  Goal: Patients continuum of care needs are met  Description: Patients continuum of care needs are met  Outcome: Completed     Problem: Nutrition  Goal: Optimal nutrition therapy  Outcome: Completed

## 2021-12-09 NOTE — PROGRESS NOTES
Dressing change performed per order. Evelyne Hatfield and bedside due to father not being able to pick patient up. Flu shot given to left deltoid.

## 2021-12-09 NOTE — PROGRESS NOTES
Pt leaving via car to home. Discharge instructions given. Pt voiced understanding. Copy of discharge and scripts with patient. Iv removed. CP and PE completed. No further needs at discharge. Pt leaving with all personal belonging. Supplies for 5 days of dressing changes given. Instructions on how to change given to pt, pt's aunt, and pts father over the phone.

## 2021-12-09 NOTE — PROGRESS NOTES
enoxaparin  30 mg SubCUTAneous BID    influenza virus vaccine  0.5 mL IntraMUSCular Prior to discharge    metroNIDAZOLE  500 mg Oral 3 times per day       Continuous Infusions:   sodium chloride 75 mL/hr at 12/08/21 2144    sodium chloride         PRN Meds:  sodium chloride flush, sodium chloride, ondansetron **OR** ondansetron, polyethylene glycol, acetaminophen **OR** acetaminophen, potassium chloride **OR** potassium alternative oral replacement **OR** potassium chloride, magnesium sulfate      Data:  CBC:   Recent Labs     12/06/21  1631 12/07/21  0512   WBC 11.5* 11.5*   HGB 15.5 13.6   HCT 47.6 42.3   MCV 96.4 96.8    386     BMP:   Recent Labs     12/06/21  1631 12/07/21  0512   * 137   K 4.0 4.2   CL 97* 100   CO2 27 24   BUN 12 12   CREATININE 0.8* 0.9     LIVER PROFILE:   Recent Labs     12/06/21  1631 12/07/21  0512   AST 34 28   ALT 63* 51*   BILITOT 0.3 0.4   ALKPHOS 147* 119     PT/INR:   Recent Labs     12/07/21  0512   PROTIME 18.0*   INR 1.56*     Cultures:    0 Result Notes    Component 12/6/21 1817 Resulting Agency   WOUND/ABSCESS  Abnormal   Mixed enteric radha  Light growth   No further workup   [facility]   Gram Stain Result  Abnormal   1+ WBC's (Polymorphonuclear)   1+ Gram positive cocci   1+ Gram positive rods   1+ Gram negative rods   [facility]   WOUND/ABSCESS Moderate growth   Susceptibility testing of penicillin and other beta lactams is   not necessary for beta hemolytic Streptococci since resistant   strains have not been identified. (CLSI M100)              Results for Genie Del Rio (MRN [id number]) as of 12/7/2021 12:01   Ref.  Range 12/6/2021 16:52   SARS-CoV-2, NAAT Latest Ref Range: Not Detected  Not Detected     CT PELVIS W CONTRAST Additional Contrast? None   Final Result   Soft tissue ulceration with a 4 cm fluid collection containing gas in the   subcutaneous soft tissues adjacent to the ulceration suggesting an abscess. Assessment and Plan     #Cellulitis and abscess of the left ischial region    Admitted to hospital.  Started on IV vancomycin and cefepime. Oral Flagyl added by ID. Bedside debridement done by ID. Cultures with strep bacteria  Improved fevers , wbc normal   BP stable   Daily dressings done per ID. Plan to dc today on oral augmentin and follow up wound care  - pressure off loading     #Paraplegia from a prior injury. Pt is bed bound and uses electric wheelchair at home     Continue home baclofen as needed for spasticity    #Neurogenic bowel and bladder. Does intermittent catheterizations at home , garcia placed here and will be removed at dc     #GERD stable. #Depression at baseline.  On lexapro    # hx of DVT and PE - on xarelto , resume     Dc planning     Scott Castro MD, MD 12/9/2021 7:25 AM

## 2021-12-09 NOTE — PLAN OF CARE
Nutrition Problem #1: Increased nutrient needs  Intervention: Food and/or Nutrient Delivery: Continue Current Diet, Start Oral Nutrition Supplement  Nutritional Goals: pt will increse his intake of protein by conuming > 75% of meals and 100% of ensure HP to promote adequate healing

## 2021-12-10 LAB
BLOOD CULTURE, ROUTINE: NORMAL
CULTURE, BLOOD 2: NORMAL

## 2021-12-16 ENCOUNTER — HOSPITAL ENCOUNTER (OUTPATIENT)
Dept: WOUND CARE | Age: 51
Discharge: HOME | End: 2021-12-16
Payer: COMMERCIAL

## 2021-12-16 VITALS
HEIGHT: 73 IN | SYSTOLIC BLOOD PRESSURE: 137 MMHG | BODY MASS INDEX: 30 KG/M2 | TEMPERATURE: 98.6 F | DIASTOLIC BLOOD PRESSURE: 88 MMHG | WEIGHT: 226.4 LBS | HEART RATE: 109 BPM | RESPIRATION RATE: 18 BRPM

## 2021-12-16 DIAGNOSIS — L89.324 PRESSURE INJURY OF LEFT ISCHIUM, STAGE 4 (HCC): Primary | ICD-10-CM

## 2021-12-16 PROCEDURE — 11043 DBRDMT MUSC&/FSCA 1ST 20/<: CPT

## 2021-12-16 PROCEDURE — 99214 OFFICE O/P EST MOD 30 MIN: CPT

## 2021-12-16 PROCEDURE — 11043 DBRDMT MUSC&/FSCA 1ST 20/<: CPT | Performed by: INTERNAL MEDICINE

## 2021-12-16 RX ORDER — DIAZEPAM 2 MG/1
2 TABLET ORAL NIGHTLY PRN
COMMUNITY
End: 2021-12-28

## 2021-12-16 RX ORDER — LIDOCAINE 40 MG/G
CREAM TOPICAL ONCE
Status: DISCONTINUED | OUTPATIENT
Start: 2021-12-16 | End: 2021-12-17 | Stop reason: HOSPADM

## 2021-12-16 RX ORDER — BACITRACIN ZINC AND POLYMYXIN B SULFATE 500; 1000 [USP'U]/G; [USP'U]/G
OINTMENT TOPICAL ONCE
Status: CANCELLED | OUTPATIENT
Start: 2021-12-16 | End: 2021-12-16

## 2021-12-16 RX ORDER — LIDOCAINE 50 MG/G
OINTMENT TOPICAL ONCE
Status: CANCELLED | OUTPATIENT
Start: 2021-12-16 | End: 2021-12-16

## 2021-12-16 RX ORDER — LIDOCAINE HYDROCHLORIDE 40 MG/ML
SOLUTION TOPICAL ONCE
Status: CANCELLED | OUTPATIENT
Start: 2021-12-16 | End: 2021-12-16

## 2021-12-16 RX ORDER — LIDOCAINE 40 MG/G
CREAM TOPICAL ONCE
Status: CANCELLED | OUTPATIENT
Start: 2021-12-16 | End: 2021-12-16

## 2021-12-16 ASSESSMENT — PAIN SCALES - GENERAL: PAINLEVEL_OUTOF10: 0

## 2021-12-16 NOTE — PLAN OF CARE
Patient new return to [address] [place] [address],[address] today,presents with left ischial stage 3 pressure ulcer. Wound debridement per Dr. Ritchie Mendoza, Pt tolerated well. NPWT discussed today per Dr. Ritchie eMndoza, Pt to think about this week and re-evaluate next visit. POC reviewed with Pt and family at bedside. Follow up in [address] [place] [address] in 1 week as ordered. Pt. Aware to call sooner with any problems or questions/concerns.  MD orders/D/C instructions reviewed with patient, all questions answered; copy of instructions given to patient

## 2021-12-16 NOTE — PLAN OF CARE
[address] [facility] Physician Orders and Discharge [address]  [address]  [place], [facility]  Telephone: [phone]      Fax: [phone] home care company:  [employer] . Your wound-care supplies will be provided by:  Home Care . NAME:  Elly Matos   YOB: 1970  PRIMARY DIAGNOSIS FOR WOUND CARE CENTER:  Pressure Wound- Stage 4 . Wound cleansing:   Do not scrub or use excessive force. Wash hands with soap and water before and after dressing changes. Prior to applying a clean dressing, cleanse wound with normal saline, wound cleanser, or mild soap and water. Ask your physician or nurse before getting the wound(s) wet in the shower. Wound care for home:    Left Ischial Wound:   Dr. Annetta Blakely used Silver Nitrate on your wound today. The wound will be black or silver in appearance  for the next several days, this is normal.   Triad to any discolored areas in wound   Opticell Ag   Dry Dressing  Change Daily or every other day depending on drainage    Please note, all wounds (unless stated otherwise here) were mechanically debrided at the time of cleansing here in the wound-care center today, so a small amount of pain, drainage or bleeding from that process might be expected, and is normal.     All products for home use, including multiple products for a single wound if applicable, are medically necessary in order to achieve the best chance at timely wound healing. See provider documentation for details if needed. Substituted dressings applied in the [facility] today, if applicable:    N/A    New orders for this week (labs, imaging, medications, etc.):    N/A    Additional instructions for specific diagnoses:  Negative pressure wound therapy (wound vac) discussed today in visit, please consider and we will re-evaluate next visit.      F/U Appointment is with Dr. Annetta Blakely  in 1.5 weeks on                                   at .     Your nurse  is Km Grigsby RN. If we applied slip-resistant hospital socks today, be sure to remove them at least once a day to inspect your toes or feet, even if you're not changing the wraps or dressings underneath. If you see anything concerning (redness, excess moisture, etc), please call and let us know right away. Should you experience any significant changes in your wound(s) (including redness, increased warmth, increased pain, increased drainage, odor, or fever) or have questions about your wound care, please contact the [employer] at [phone] Monday-Thursday from 8:00 am  4:30 pm, or Friday from 8:00 am - 2:30 pm.  If you need help with your wound outside these hours and cannot wait until we are again available, contact your home-care company (if applicable), your PCP, or go to the nearest emergency room.

## 2021-12-21 PROBLEM — L03.317 CELLULITIS AND ABSCESS OF BUTTOCK: Status: RESOLVED | Noted: 2021-12-07 | Resolved: 2021-12-21

## 2021-12-21 PROBLEM — L02.31 CELLULITIS AND ABSCESS OF BUTTOCK: Status: RESOLVED | Noted: 2021-12-07 | Resolved: 2021-12-21

## 2021-12-21 NOTE — PROGRESS NOTES
[facility] Progress Note    Art Gonzalez     : 1970    DATE OF VISIT:  2021    Subjective:     Art Gonzalez is a 46 y.o. male who has a pressure ulcer located on the left ischium. Significant symptoms or pertinent wound history since last visit: feeling ok overall, just finished his course of Abx, no side effects noted, no fever, eating well. Having a little trouble with transfers with his [employer], in terms of minor trauma to the wound area, but in terms of static offloading, he's doing very well with his bed and his chair. Doing ok with dressing changes also, and has his aunt to help on non-home-care days, as his parents are actually both dealing with their own acute medical issues right now. Additional ulcer(s) noted? no.      His current medication list consists of Bardex Lubricath Catheter, Catheters, Depend Real-Fit Briefs for Men, Disposable Gloves, Disposable Liners, baclofen, bisacodyl, diazePAM, escitalopram, oxybutynin, rivaroxaban, and senna-docusate. Allergies: Patient has no known allergies.     Objective:     Vitals:    21 1400   BP: 137/88   Pulse: 109   Resp: 18   Temp: 98.6 °F (37 °C)   TempSrc: Oral   Weight: 226 lb 6.4 oz (102.7 kg)   Height: 6' 1\" (1.854 m)     Constitutional:  well-developed, well-nourished, NAD, overweight   Psychiatric:  oriented to person, place and time; mood and affect appropriate for the situation   No icterus, thrush, drug rash, abd tenderness  Musculoskeletal:  no clubbing, cyanosis or petechiae; hips and pelvic area with no gross effusions, joint misalignment or acute arthritis  Skin: warm, dry, normal turgor; no rash; left ischial pressure ulcer with periwound looking very healthy, pink, warm, no ecchymosis, no signs of ongoing friction or pressure; wound bed itself is looking better also, mostly red and granular, small inferior area of undermining, some fibrin and biofilm, superior and lateral corner well, with no significant complications. The patient's level of pain during and after the procedure was monitored. Post-debridement measurements, if different from pre-debridement, are in the flowsheet as well. Discharge plan:     Treatment in the wound care center today, per RN documentation: Wound 12/16/21 #1 Left Lower Buttocks, Pressure, Stage 4, Onset 10/2021-Dressing/Treatment: Other (comment) (triad opticel ag bordered gauze). No additional ABx needed. Keep up with protein intake. Try to minimize time seated, keep changing positions frequently, try to do transfers with as little sliding as possible. Next time he's back, if the wound bed is nearly all granular, might discuss NPWT to speed healing, especially if there's still some depth and undermining, though it might be hard to keep an NPWT dressing in place on this area of the body, especially as he transfers back and forth during the day. Home treatment: good handwashing before and after any dressing changes. Cleanse wound with saline or soap & water before dressing change. May use Vaseline (petrolatum), Aquaphor, Aveeno, CeraVe, Cetaphil, Eucerin, Lubriderm, etc for dry skin. Dressing type for home: as above, once daily. Written discharge instructions given to patient. Follow up in 2 weeks.     Electronically signed by Linda Christopher MD on 12/21/2021 at 11:37 AM.

## 2021-12-28 ENCOUNTER — HOSPITAL ENCOUNTER (OUTPATIENT)
Dept: WOUND CARE | Age: 51
Discharge: HOME | End: 2021-12-28
Payer: COMMERCIAL

## 2021-12-28 VITALS
WEIGHT: 227.2 LBS | BODY MASS INDEX: 30.11 KG/M2 | RESPIRATION RATE: 18 BRPM | HEART RATE: 90 BPM | TEMPERATURE: 98.1 F | SYSTOLIC BLOOD PRESSURE: 134 MMHG | HEIGHT: 73 IN | DIASTOLIC BLOOD PRESSURE: 82 MMHG

## 2021-12-28 DIAGNOSIS — L89.324 PRESSURE INJURY OF LEFT ISCHIUM, STAGE 4 (HCC): Primary | ICD-10-CM

## 2021-12-28 DIAGNOSIS — N31.9 NEUROGENIC BLADDER: Primary | ICD-10-CM

## 2021-12-28 DIAGNOSIS — L89.324 PRESSURE INJURY OF LEFT ISCHIUM, STAGE 4 (HCC): ICD-10-CM

## 2021-12-28 PROCEDURE — 99214 OFFICE O/P EST MOD 30 MIN: CPT

## 2021-12-28 PROCEDURE — 99213 OFFICE O/P EST LOW 20 MIN: CPT | Performed by: INTERNAL MEDICINE

## 2021-12-28 RX ORDER — DIAZEPAM 2 MG/1
TABLET ORAL
Qty: 30 TABLET | Refills: 2 | Status: SHIPPED | OUTPATIENT
Start: 2021-12-28 | End: 2022-01-27

## 2021-12-28 RX ORDER — LIDOCAINE 40 MG/G
CREAM TOPICAL ONCE
Status: DISCONTINUED | OUTPATIENT
Start: 2021-12-28 | End: 2021-12-29 | Stop reason: HOSPADM

## 2021-12-28 RX ORDER — BACITRACIN ZINC AND POLYMYXIN B SULFATE 500; 1000 [USP'U]/G; [USP'U]/G
OINTMENT TOPICAL ONCE
Status: CANCELLED | OUTPATIENT
Start: 2021-12-28 | End: 2021-12-28

## 2021-12-28 RX ORDER — LIDOCAINE 40 MG/G
CREAM TOPICAL ONCE
Status: CANCELLED | OUTPATIENT
Start: 2021-12-28 | End: 2021-12-28

## 2021-12-28 RX ORDER — LIDOCAINE HYDROCHLORIDE 40 MG/ML
SOLUTION TOPICAL ONCE
Status: CANCELLED | OUTPATIENT
Start: 2021-12-28 | End: 2021-12-28

## 2021-12-28 RX ORDER — LIDOCAINE 50 MG/G
OINTMENT TOPICAL ONCE
Status: CANCELLED | OUTPATIENT
Start: 2021-12-28 | End: 2021-12-28

## 2021-12-28 ASSESSMENT — PAIN SCALES - GENERAL: PAINLEVEL_OUTOF10: 0

## 2021-12-28 NOTE — PLAN OF CARE
Pt seen in [facility] - no debridement  today - more bleeding noted - pt taking anticoagulant - treatment changed to triad to renetta wound - cont AG Alginate and dry dressing - stressed offloading - discussed poss NPWT - will hold off at this time - reviewed AVS - f/u in 2 weeks

## 2021-12-28 NOTE — PLAN OF CARE
[facility] Physician Orders and Discharge [address]  [address]  [place], [facility]  Telephone: [phone]      Fax: [phone] home care company:   [employer] . Your wound-care supplies will be provided by:  Home Care . NAME:  Masha Blackwood   YOB: 1970  PRIMARY DIAGNOSIS FOR WOUND CARE CENTER:  Pressure Wound- Stage 4 . Wound cleansing:   Do not scrub or use excessive force. Wash hands with soap and water before and after dressing changes. Prior to applying a clean dressing, cleanse wound with normal saline, wound cleanser, or mild soap and water. Ask your physician or nurse before getting the wound(s) wet in the shower. Wound care for home:     Left Ischial Wound:      Triad to renetta wound  Opticell Ag   Dry Dressing  Change Daily or every other day depending on drainage     Please note, all wounds (unless stated otherwise here) were mechanically debrided at the time of cleansing here in the wound-care center today, so a small amount of pain, drainage or bleeding from that process might be expected, and is normal.      All products for home use, including multiple products for a single wound if applicable, are medically necessary in order to achieve the best chance at timely wound healing. See provider documentation for details if needed. Substituted dressings applied in the [facility] today, if applicable:        New orders for this week (labs, imaging, medications, etc.):     Additional instructions for specific diagnoses:  Negative pressure wound therapy (wound vac) discussed today in visit, please consider and we will re-evaluate next visit. F/U Appointment is with Dr. Margot Aguilar  in 2 weeks on                                   at                       .     Your nurse  is Cornel Cespedes RN.       If we applied slip-resistant hospital socks today, be sure to remove them at least once a day to inspect your toes or feet, even if you're not changing the wraps or dressings underneath. If you see anything concerning (redness, excess moisture, etc), please call and let us know right away. Should you experience any significant changes in your wound(s) (including redness, increased warmth, increased pain, increased drainage, odor, or fever) or have questions about your wound care, please contact the [employer] at [phone] Monday-Thursday from 8:00 am  4:30 pm, or Friday from 8:00 am - 2:30 pm.  If you need help with your wound outside these hours and cannot wait until we are again available, contact your home-care company (if applicable), your PCP, or go to the nearest emergency room.

## 2022-01-03 NOTE — PROGRESS NOTES
[facility] Progress Note    Angelo Humphreys     : 1970    DATE OF VISIT:  2021    Subjective:     Angelo Humphreys is a 46 y.o. male who has a pressure ulcer located on the left ischium. Current complaint of pain in this ulcer? yes. Quality of pain: dull  Timing: intermittent  Severity: mild  Associated Signs/Symptoms: drainage (moderate, bloody)  Other significant symptoms or pertinent ulcer history: doing ok overall, no F/C/D, no side effects from recent Abx. When he's static, he's offloading very well, still has some issues with transfers at times, in terms of causing minor trauma and bleeding. Doing well with dressing changes, eating well. Additional ulcer(s) noted? no.      Mr. Angie Isidro has a past medical history of Cellulitis and abscess of buttock, Clavicular fracture, closed, shaft, Closed fracture of multiple ribs, Closed fracture of scapula, DVT of leg (deep venous thrombosis) ([address] Hemothorax, traumatic, Hx of blood clots, Hypertension, Injury, crush, back, Pneumothorax, closed, traumatic, Pulmonary embolism without acute cor pulmonale ([address] SAH (subarachnoid hemorrhage) ([address] Spinal cord injury, thoracic (T1-T6) ([address] Stage II pressure ulcer of left buttock ([address] and T6 vertebral fracture ([address] He has a past surgical history that includes back surgery and fracture surgery. His family history includes Coronary Art Dis in his maternal grandfather; Dementia in his maternal grandmother; No Known Problems in his father, mother, paternal grandfather, and sister; Tuberculosis in his paternal grandmother. Mr. Angie Isidro reports that he has never smoked. He has never used smokeless tobacco. He reports that he does not drink alcohol and does not use drugs.     His current medication list consists of Bardex Lubricath Catheter, Catheters, Depend Real-Fit Briefs for Men, Disposable Gloves, Disposable Liners, baclofen, bisacodyl, diazePAM, escitalopram, (gastroesophageal reflux disease) K21.9       Assessment of today's active condition(s): Hx of work accident, T-spine trauma, paraplegia, stage 4 left ischial pressure ulcer, complicated by soft tissue infection, but that's now resolved, no osteo, making good progress toward healing. Factors contributing to occurrence and/or persistence of the chronic ulcer include chronic pressure, decreased mobility and shear force. Medical necessity of today's visit is shown by the above documentation. Sharp debridement is not indicated today, based upon the exam findings in the wound(s) above. Discharge plan:     Treatment in the wound care center today, per RN documentation: Wound 12/16/21 #1 Left Lower Buttocks, Pressure, Stage 4, Onset 10/2021-Dressing/Treatment: Other (comment) (triad silver alginate 4x4's tape). Discussed the option of NPWT today, but he would rather continue conservative Rx for at least a couple more weeks, since he's making progress, and he's concerned with how well the NPWT dressing will hold up to daily transfers and movement at home -- I think that's fine, and we'll see how it goes. Keep focused on offloading and protein intake. Home treatment: good handwashing before and after any dressing changes. Cleanse ulcer with saline or soap & water before dressing change. May use Vaseline (petrolatum), Aquaphor, Aveeno, CeraVe, Cetaphil, Eucerin, Lubriderm, etc for dry skin. Dressing type for home: Periwound zinc oxide, Silver alginate (moisten if wound becomes dry) and Dry cover dressing, every day or two, as needed. Written discharge instructions given to patient. Follow up in 2 weeks.     Electronically signed by Rachel Nick MD on 1/2/2022 at 7:21 PM.

## 2022-01-13 ENCOUNTER — HOSPITAL ENCOUNTER (OUTPATIENT)
Dept: WOUND CARE | Age: 52
Discharge: HOME | End: 2022-01-13
Payer: COMMERCIAL

## 2022-01-13 VITALS
HEART RATE: 97 BPM | RESPIRATION RATE: 18 BRPM | HEIGHT: 73 IN | BODY MASS INDEX: 29.9 KG/M2 | SYSTOLIC BLOOD PRESSURE: 134 MMHG | TEMPERATURE: 100.3 F | WEIGHT: 225.6 LBS | DIASTOLIC BLOOD PRESSURE: 77 MMHG

## 2022-01-13 DIAGNOSIS — S24.101A SPINAL CORD INJURY, T1-T6 (HCC): ICD-10-CM

## 2022-01-13 DIAGNOSIS — L89.324 PRESSURE INJURY OF LEFT ISCHIUM, STAGE 4 (HCC): ICD-10-CM

## 2022-01-13 DIAGNOSIS — L89.324 PRESSURE INJURY OF LEFT ISCHIUM, STAGE 4 (HCC): Primary | ICD-10-CM

## 2022-01-13 PROCEDURE — 99212 OFFICE O/P EST SF 10 MIN: CPT | Performed by: INTERNAL MEDICINE

## 2022-01-13 PROCEDURE — 99214 OFFICE O/P EST MOD 30 MIN: CPT

## 2022-01-13 RX ORDER — LIDOCAINE 40 MG/G
CREAM TOPICAL ONCE
Status: CANCELLED | OUTPATIENT
Start: 2022-01-13 | End: 2022-01-13

## 2022-01-13 RX ORDER — BACLOFEN 10 MG/1
TABLET ORAL
Qty: 180 TABLET | Refills: 0 | Status: SHIPPED | OUTPATIENT
Start: 2022-01-13 | End: 2022-04-07

## 2022-01-13 RX ORDER — LIDOCAINE HYDROCHLORIDE 40 MG/ML
SOLUTION TOPICAL ONCE
Status: CANCELLED | OUTPATIENT
Start: 2022-01-13 | End: 2022-01-13

## 2022-01-13 RX ORDER — BACITRACIN ZINC AND POLYMYXIN B SULFATE 500; 1000 [USP'U]/G; [USP'U]/G
OINTMENT TOPICAL ONCE
Status: CANCELLED | OUTPATIENT
Start: 2022-01-13 | End: 2022-01-13

## 2022-01-13 RX ORDER — LIDOCAINE 50 MG/G
OINTMENT TOPICAL ONCE
Status: CANCELLED | OUTPATIENT
Start: 2022-01-13 | End: 2022-01-13

## 2022-01-13 RX ORDER — LIDOCAINE 40 MG/G
CREAM TOPICAL ONCE
Status: DISCONTINUED | OUTPATIENT
Start: 2022-01-13 | End: 2022-01-14 | Stop reason: HOSPADM

## 2022-01-13 NOTE — PLAN OF CARE
1909 [facility] Physician Orders and Discharge [address], 18 [facility]  Telephone: [phone]      Fax: [phone]        Your home care [address] [facility] .     Your wound-care supplies will be provided by: [facility] .     NAME: Anderson Billingsley  DATE of BIRTH:  1970  PRIMARY DIAGNOSIS FOR WOUND CARE CENTER:  Pressure Wound- Stage 4 .     Wound cleansing:   Do not scrub or use excessive force. Wash hands with soap and water before and after dressing changes. Prior to applying a clean dressing, cleanse wound with normal saline, wound cleanser, or mild soap and water. Ask your physician or nurse before getting the wound(s) wet in the shower.                Wound care for home:     Left Ischial Wound:   Dr. Patrick Gilliam used Silver Nitrate on your wound today. The wound will be black or silver in appearance  for the next several days, this is normal.   Triad to renetta wound  Opticell Ag- tucked into undermining and depth of wound   Dry Dressing  Change Daily or every other day depending on drainage     Please note, all wounds (unless stated otherwise here) were mechanically debrided at the time of cleansing here in the wound-care center today, so a small amount of pain, drainage or bleeding from that process might be expected, and is normal.      All products for home use, including multiple products for a single wound if applicable, are medically necessary in order to achieve the best chance at timely wound healing.  See provider documentation for details if needed.     Substituted dressings applied in the [address] [place] [address],[address] today, if applicable:     N/A     New orders for this week (labs, imaging, medications, etc.):     You may purchase AllKare to help protect skin around wound under dressing      Additional instructions for specific diagnoses:  Negative pressure wound therapy (wound vac) discussed today in visit, please consider and we will re-evaluate next visit- on hold for bleeding      F/U Appointment is with Dr. Charito Polo 2 weeks on                                   at                       .     Your nurse  is Weston Arias RN.      If we applied slip-resistant hospital socks today, be sure to remove them at least once a day to inspect your toes or feet, even if you're not changing the wraps or dressings underneath. If you see anything concerning (redness, excess moisture, etc), please call and let us know right away.     Should you experience any significant changes in your wound(s) (including redness, increased warmth, increased pain, increased drainage, odor, or fever) or have questions about your wound care, please contact the [employer] at [phone] Monday-Thursday from 8:00 am  4:30 pm, or Friday from 8:00 am - 2:30 pm.  If you need help with your wound outside these hours and cannot wait until we are again available, contact your home-care company (if applicable), your PCP, or go to the nearest emergency room.

## 2022-01-13 NOTE — PLAN OF CARE
Pt seen in [facility],[address] today for follow up. Wound continues to bleed. No debridement today. NPWT discussed, on hold at this time. Follow up in [facility] in 2 weeks as ordered. Pt. Aware to call sooner with any problems or questions/concerns.  MD orders/D/C instructions reviewed with patient, all questions answered; copy of instructions given to patient

## 2022-01-18 NOTE — PROGRESS NOTES
[facility] Progress Note    Freddy Spain     : 1970    DATE OF VISIT:  2022    Subjective:     Freddy Spain is a 46 y.o. male who has a pressure ulcer located on the left ischium. Current complaint of pain in this ulcer? no.     Other significant symptoms or pertinent ulcer history: feeling well overall, good appetite, no fevers, eating well, doing well with dressing changes. Home care was encouraging him to sit on the side of the bed for short periods of time, and I'm ok with that as well, but he's being very conservative about that, really wanting to try to speed healing, remaining lying down for as much as possible, lying on his right side. Still has a fair amount of bleeding from the wound at times, usually after transferring and the minor amount of trauma that causes, on top of his anticoagulation. Additional ulcer(s) noted? no.      Mr. Lucas Ding has a past medical history of Cellulitis and abscess of buttock, Clavicular fracture, closed, shaft, Closed fracture of multiple ribs, Closed fracture of scapula, DVT of leg (deep venous thrombosis) ([address] Hemothorax, traumatic, Hypertension, Injury, crush, back, Pneumothorax, closed, traumatic, Pulmonary embolism without acute cor pulmonale ([address] SAH (subarachnoid hemorrhage) ([address] Spinal cord injury, thoracic (T1-T6) ([address] Stage II pressure ulcer of left buttock ([address] and T6 vertebral fracture ([address] He has a past surgical history that includes back surgery and fracture surgery. His family history includes Coronary Art Dis in his maternal grandfather; Dementia in his maternal grandmother; No Known Problems in his father, mother, paternal grandfather, and sister; Tuberculosis in his paternal grandmother. Mr. Lucas Ding reports that he has never smoked. He has never used smokeless tobacco. He reports that he does not drink alcohol and does not use drugs.     His current medication list consists of Bardex Lubricath Catheter, Catheters, Depend Real-Fit Briefs for Men, Disposable Gloves, Disposable Liners, baclofen, bisacodyl, diazePAM, escitalopram, oxybutynin, rivaroxaban, and senna-docusate. Allergies: Patient has no known allergies. Pertinent items from the review of systems are discussed in the HPI; the remainder of the ROS was reviewed and is negative. Objective:     Vitals:    01/13/22 1358   BP: 134/77   Pulse: 97   Resp: 18   Temp: 100.3 °F (37.9 °C)   TempSrc: Oral   Weight: 225 lb 9.6 oz (102.3 kg)   Height: 6' 1\" (1.854 m)       Constitutional:  well-developed, well-nourished, NAD, overweight   Psychiatric:  oriented to person, place and time; mood and affect appropriate for the situation   Musculoskeletal:  no clubbing, cyanosis or petechiae; hips and pelvic area with no gross effusions, joint misalignment or acute arthritis  Skin: warm, dry, normal turgor; no rash   Left ischial pressure ulcer with periwound looking very healthy, pink, warm, no ecchymosis, no signs of ongoing friction or pressure; wound bed itself is looking better also, basically all red and granular, smaller inferior area of undermining, small amount of loose fibrin and biofilm this week, superior and lateral depth and necrosis improved as well.     Today's Wound Measurements, per RN documentation:  Wound 12/16/21 #1 Left Lower Buttocks, Pressure, Stage 4, Onset 10/2021-Wound Length (cm): 3.5 cm    Wound 12/16/21 #1 Left Lower Buttocks, Pressure, Stage 4, Onset 10/2021-Wound Width (cm): 1.5 cm    Wound 12/16/21 #1 Left Lower Buttocks, Pressure, Stage 4, Onset 10/2021-Wound Depth (cm): 2.4 cm  ______________________________    Lab Results   Component Value Date    LABALBU 3.2 (L) 12/07/2021     Lab Results   Component Value Date    CREATININE 0.9 12/07/2021     Lab Results   Component Value Date    HGB 13.6 12/07/2021     Assessment:     Patient Active Problem List   Diagnosis Code    T5 spinal cord injury ([address] S24.102A    Neurogenic bladder N31.9    Neurogenic bowel K59.2    Depression F32. A    Paraplegia ([facility]) G82.20    Pressure ulcer of left ischium, stage 4 ([facility]) L89.324    GERD (gastroesophageal reflux disease) K21.9       Assessment of today's active condition(s): Hx T-spine injury, paraplegia, development of stage 4 left ischial pressure ulcer, complicated by deep soft tissue infection at first, no osteo, overall doing very well with a couple of sharp debridements, good offloading, simple local care. Have considered adding NPWT, but I think it could be risky, in terms of his tendency to bleed, and I also think it might be hard to keep that dressing in place every time he must transfer out of bed (even to the bathroom, etc); he's ok with the slow, steady pace of healing for now, so no changes planned this week. Factors contributing to occurrence and/or persistence of the chronic ulcer include chronic pressure, decreased mobility and shear force. Medical necessity of today's visit is shown by the above documentation. Sharp debridement is not indicated today, based upon the exam findings in the wound(s) above. Discharge plan:     Treatment in the wound care center today, per RN documentation: Wound 12/16/21 #1 Left Lower Buttocks, Pressure, Stage 4, Onset 10/2021-Dressing/Treatment: Other (comment) (triad silver alginate mepilex border). Keep up the good work with protein intake and offloading practices in general.   Be especially careful when transferring, trying to slide as little as possible, and instead lifting and moving as best he can. I do think some short periods of sitting on the edge of his bed are ok - I'm sure he's not going to overdo it. Home treatment: good handwashing before and after any dressing changes. Cleanse ulcer with saline or soap & water before dressing change. May use Vaseline (petrolatum), Aquaphor, Aveeno, CeraVe, Cetaphil, Eucerin, Lubriderm, etc for dry skin.      Dressing type for home: Periwound zinc oxide, Silver alginate (moisten if wound becomes dry) and Dry cover dressing, once daily. Written discharge instructions given to patient. Follow up in 2 weeks, call sooner with concerns or questions.     Electronically signed by Nikhil Small MD on 1/18/2022 at 3:47 PM.

## 2022-01-27 ENCOUNTER — HOSPITAL ENCOUNTER (OUTPATIENT)
Dept: WOUND CARE | Age: 52
Discharge: HOME | End: 2022-01-27
Payer: COMMERCIAL

## 2022-01-27 VITALS
DIASTOLIC BLOOD PRESSURE: 76 MMHG | HEART RATE: 73 BPM | SYSTOLIC BLOOD PRESSURE: 149 MMHG | RESPIRATION RATE: 18 BRPM | HEIGHT: 73 IN | BODY MASS INDEX: 29.79 KG/M2 | WEIGHT: 224.8 LBS | TEMPERATURE: 98.3 F

## 2022-01-27 DIAGNOSIS — L89.324 PRESSURE INJURY OF LEFT ISCHIUM, STAGE 4 (HCC): Primary | ICD-10-CM

## 2022-01-27 DIAGNOSIS — L89.324 PRESSURE INJURY OF LEFT ISCHIUM, STAGE 4 (HCC): ICD-10-CM

## 2022-01-27 DIAGNOSIS — L92.9 HYPERGRANULATION: ICD-10-CM

## 2022-01-27 PROCEDURE — 17250 CHEM CAUT OF GRANLTJ TISSUE: CPT

## 2022-01-27 PROCEDURE — 17250 CHEM CAUT OF GRANLTJ TISSUE: CPT | Performed by: INTERNAL MEDICINE

## 2022-01-27 RX ORDER — LIDOCAINE HYDROCHLORIDE 40 MG/ML
SOLUTION TOPICAL ONCE
Status: CANCELLED | OUTPATIENT
Start: 2022-01-27 | End: 2022-01-27

## 2022-01-27 RX ORDER — LIDOCAINE 50 MG/G
OINTMENT TOPICAL ONCE
Status: CANCELLED | OUTPATIENT
Start: 2022-01-27 | End: 2022-01-27

## 2022-01-27 RX ORDER — LIDOCAINE 40 MG/G
CREAM TOPICAL ONCE
Status: DISCONTINUED | OUTPATIENT
Start: 2022-01-27 | End: 2022-01-28 | Stop reason: HOSPADM

## 2022-01-27 RX ORDER — LIDOCAINE 40 MG/G
CREAM TOPICAL ONCE
Status: CANCELLED | OUTPATIENT
Start: 2022-01-27 | End: 2022-01-27

## 2022-01-27 RX ORDER — BACITRACIN ZINC AND POLYMYXIN B SULFATE 500; 1000 [USP'U]/G; [USP'U]/G
OINTMENT TOPICAL ONCE
Status: CANCELLED | OUTPATIENT
Start: 2022-01-27 | End: 2022-01-27

## 2022-01-27 ASSESSMENT — PAIN SCALES - GENERAL: PAINLEVEL_OUTOF10: 0

## 2022-01-27 NOTE — PLAN OF CARE
[address] [facility] Physician Orders and Discharge [address], [address] [facility], [facility]  Telephone: [phone]      Fax: [phone]        Your home care [address] [facility] .     Your wound-care supplies will be provided by: [facility] [place] .     NAME: Dakota Alvarado  DATE of BIRTH:  1970  PRIMARY DIAGNOSIS FOR WOUND CARE CENTER:  Pressure Wound- Stage 4 .     Wound cleansing:   Do not scrub or use excessive force. Wash hands with soap and water before and after dressing changes. Prior to applying a clean dressing, cleanse wound with normal saline, wound cleanser, or mild soap and water. Ask your physician or nurse before getting the wound(s) wet in the shower.                Wound care for home:     Left Ischial Wound:   Dr. Lakshmi Guadalupe used Silver Nitrate on your wound today.  The wound will be black or silver in appearance  for the next several days, this is normal.   Triad to renetta wound  Opticell Ag- tucked into undermining and depth of wound   Dry Dressing  Change Daily or every other day depending on drainage    Right Heel:   Skin prep- renetta wound  Betadine  Mepilex Heel Border   Leave in place all week- if you notice drainage please remove     Please note, all wounds (unless stated otherwise here) were mechanically debrided at the time of cleansing here in the wound-care center today, so a small amount of pain, drainage or bleeding from that process might be expected, and is normal.      All products for home use, including multiple products for a single wound if applicable, are medically necessary in order to achieve the best chance at timely wound healing.  See provider documentation for details if needed.     Substituted dressings applied in the [facility] today, if applicable:          New orders for this week (labs, imaging, medications, etc.):  You may purchase Lac-Hydrin over the counter to use on dry skin on right foot   You may purchase Bladimir to help protect skin around wound under dressing      Additional instructions for specific diagnoses:  Negative pressure wound therapy (wound vac) discussed- on hold for bleeding      F/U Appointment is with Dr. Tarah Hernandez 2 weeks on                                                          .     Your nurse  is Chula Calderon RN.      If we applied slip-resistant hospital socks today, be sure to remove them at least once a day to inspect your toes or feet, even if you're not changing the wraps or dressings underneath. If you see anything concerning (redness, excess moisture, etc), please call and let us know right away.     Should you experience any significant changes in your wound(s) (including redness, increased warmth, increased pain, increased drainage, odor, or fever) or have questions about your wound care, please contact the [employer] at [phone] Monday-Thursday from 8:00 am  4:30 pm, or Friday from 8:00 am - 2:30 pm.  If you need help with your wound outside these hours and cannot wait until we are again available, contact your home-care company (if applicable), your PCP, or go to the nearest emergency room.

## 2022-01-27 NOTE — PLAN OF CARE
Right foot DTI noted, betadine and foam dressing to be applied today in [facility]. Ischial wound continues to have large amount of bleeding with transfers. To continue current plan of care and follow up in [address] [place] [address] in 2 weeks as ordered. Plan to have Pt see Dr. Tata Miner for nail trim. Pt. Aware to call sooner with any problems or questions/concerns.  MD orders/D/C instructions reviewed with patient, all questions answered; copy of instructions given to patient

## 2022-02-03 PROBLEM — L92.9 HYPERGRANULATION: Status: ACTIVE | Noted: 2022-02-03

## 2022-02-03 NOTE — PROGRESS NOTES
88 [facility] Progress Note    Adelaide Larsen     : 1970    DATE OF VISIT:  2022    Subjective:     Adelaide Larsen is a 46 y.o. male who has a pressure ulcer located on the left ischium. Significant symptoms or pertinent wound history since last visit: feeling well, no fever, doing fine with dressing changes, doing excellent with offloading; still having a bit of wound bleeding whenever he transfers with his slide board in and out of his wheelchair, but that's not all that often. Apart from the bleeding, not much wound drainage, no purulence. Additional ulcer(s) noted? no.      His current medication list consists of Bardex Lubricath Catheter, Catheters, Depend Real-Fit Briefs for Men, Disposable Gloves, Disposable Liners, baclofen, bisacodyl, escitalopram, oxybutynin, rivaroxaban, and senna-docusate. Allergies: Patient has no known allergies. Objective:     Vitals:    22 1411   BP: (!) 149/76   Pulse: 73   Resp: 18   Temp: 98.3 °F (36.8 °C)   TempSrc: Oral   Weight: 224 lb 12.8 oz (102 kg)   Height: 6' 1\" (1.854 m)     Constitutional:  well-developed, well-nourished, NAD, overweight   Psychiatric:  oriented to person, place and time; mood and affect appropriate for the situation   Musculoskeletal:  no clubbing, cyanosis or petechiae; hips and pelvic area with no gross effusions, joint misalignment or acute arthritis  Skin: warm, dry, normal turgor; no rash   Left ischial pressure ulcer with periwound looking very healthy, pink, warm, perhaps just one small corner with ecchymosis, no signs of ongoing friction or pressure; wound bed itself is looking better also, basically all red and granular to peripherally hypergranular, smaller inferior area of undermining is resolved, no fibrin or biofilm this week, no real necrosis, still a small pocket of depth.     Today's Wound Measurements, per RN documentation:  Wound 21 #1 Left Lower Buttocks, Pressure, Stage 4, Onset 10/2021-Wound Length (cm): 3.2 cm    Wound 12/16/21 #1 Left Lower Buttocks, Pressure, Stage 4, Onset 10/2021-Wound Width (cm): 1.4 cm    Wound 12/16/21 #1 Left Lower Buttocks, Pressure, Stage 4, Onset 10/2021-Wound Depth (cm): 2.4 cm    Assessment:     Patient Active Problem List   Diagnosis Code    T5 spinal cord injury ([facility] S24.102A    Neurogenic bladder N31.9    Neurogenic bowel K59.2    Depression F32. 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 The minor trauma and bleeding with transfers is perhaps slowing him down just a bit, but not a major concern. Ideally might think about a VAC, but I think transfers and bleeding would make that very tricky. Factors contributing to occurrence and/or persistence of the chronic ulcer include chronic pressure, decreased mobility and shear force. Medical necessity of today's visit is shown by the above documentation. Sharp debridement is not indicated today, based upon the exam findings in the wound(s) above. Procedure note:     Consent obtained. Time out performed per [facility]. Anesthetic  Anesthetic: 4% Lidocaine Cream     To encourage better epithelial cell coverage, I did use AgNO3 to chemically cauterize hypergranulation tissue on the left ischium ulcer(s). This was tolerated well, with no significant pain or skin injury. Discharge plan:     Treatment in the wound care center today, per RN documentation: Wound 12/16/21 #1 Left Lower Buttocks, Pressure, Stage 4, Onset 10/2021-Dressing/Treatment:  (triad, opticel ag, mepilex border).     Home treatment: good handwashing before and after any dressing changes. Cleanse wound with saline or soap & water before dressing change. May use Vaseline (petrolatum), Aquaphor, Aveeno, CeraVe, Cetaphil, Eucerin, Lubriderm, etc for dry skin. Dressing type for home: Hypochlorous acid spray, Periwound zinc oxide, Silver alginate (moisten if wound becomes dry) and Dry cover dressing, every day or two, as needed. Written discharge instructions given to patient. Follow up in 2 weeks. He also had a concern about his nails (elongated, thickened, dystrophic, too substantial for his family to manage, and I'm even a little leery about a couple of them myself - will see if Dr. Mela Fabian could see him when he comes back in 2 weeks, either up in his office that same day, or even down here if he's available).      Electronically signed by Jairo Lagunas MD on 2/3/2022 at 7:01 AM.

## 2022-02-10 ENCOUNTER — HOSPITAL ENCOUNTER (OUTPATIENT)
Dept: WOUND CARE | Age: 52
Discharge: HOME | End: 2022-02-10
Payer: COMMERCIAL

## 2022-02-10 VITALS
RESPIRATION RATE: 18 BRPM | HEIGHT: 73 IN | SYSTOLIC BLOOD PRESSURE: 128 MMHG | HEART RATE: 87 BPM | WEIGHT: 222 LBS | BODY MASS INDEX: 29.42 KG/M2 | TEMPERATURE: 99.7 F | DIASTOLIC BLOOD PRESSURE: 85 MMHG

## 2022-02-10 DIAGNOSIS — L89.324 PRESSURE INJURY OF LEFT ISCHIUM, STAGE 4 (HCC): Primary | ICD-10-CM

## 2022-02-10 DIAGNOSIS — L89.324 PRESSURE INJURY OF LEFT ISCHIUM, STAGE 4 (HCC): ICD-10-CM

## 2022-02-10 PROCEDURE — 17250 CHEM CAUT OF GRANLTJ TISSUE: CPT | Performed by: INTERNAL MEDICINE

## 2022-02-10 PROCEDURE — 17250 CHEM CAUT OF GRANLTJ TISSUE: CPT

## 2022-02-10 RX ORDER — LIDOCAINE 40 MG/G
CREAM TOPICAL ONCE
Status: CANCELLED | OUTPATIENT
Start: 2022-02-10 | End: 2022-02-10

## 2022-02-10 RX ORDER — LIDOCAINE 50 MG/G
OINTMENT TOPICAL ONCE
Status: CANCELLED | OUTPATIENT
Start: 2022-02-10 | End: 2022-02-10

## 2022-02-10 RX ORDER — BACITRACIN ZINC AND POLYMYXIN B SULFATE 500; 1000 [USP'U]/G; [USP'U]/G
OINTMENT TOPICAL ONCE
Status: CANCELLED | OUTPATIENT
Start: 2022-02-10 | End: 2022-02-10

## 2022-02-10 RX ORDER — AMMONIUM LACTATE 12 G/100G
LOTION TOPICAL
Qty: 225 G | Refills: 1 | Status: SHIPPED | OUTPATIENT
Start: 2022-02-10

## 2022-02-10 RX ORDER — LIDOCAINE HYDROCHLORIDE 40 MG/ML
SOLUTION TOPICAL ONCE
Status: CANCELLED | OUTPATIENT
Start: 2022-02-10 | End: 2022-02-10

## 2022-02-10 RX ORDER — LIDOCAINE 40 MG/G
CREAM TOPICAL ONCE
Status: DISCONTINUED | OUTPATIENT
Start: 2022-02-10 | End: 2022-02-11 | Stop reason: HOSPADM

## 2022-02-10 ASSESSMENT — PAIN SCALES - GENERAL: PAINLEVEL_OUTOF10: 0

## 2022-02-10 NOTE — PLAN OF CARE
Silver nitrate utilized per Dr. Best Kerr, Pt tolerated well. Pt to continue current plan of care with addition of iodoform packing to depth of wound and follow up in [address] [place] [address] in 2 weeks as ordered. Toe nails trimmed per Dr. Wanda Acevedo. Pt. Aware to call sooner with any problems or questions/concerns.  MD orders/D/C instructions reviewed with patient, all questions answered; copy of instructions given to patient

## 2022-02-11 NOTE — PLAN OF CARE
215 [facility] Physician Orders and Discharge [address], 49 [facility]  Telephone: [phone]      Fax: [phone]        Your home care [address] [facility] .     Your wound-care supplies will be provided by: [facility] .     NAME: Jerardo Pascal  DATE of BIRTH:  1970  PRIMARY DIAGNOSIS FOR WOUND CARE CENTER:  Pressure Wound- Stage 4 .     Wound cleansing:   Do not scrub or use excessive force. Wash hands with soap and water before and after dressing changes. Prior to applying a clean dressing, cleanse wound with normal saline, wound cleanser, or mild soap and water. Ask your physician or nurse before getting the wound(s) wet in the shower.                Wound care for home:     Left Ischial Wound:   Dr. Bibiana Curtis used Silver Nitrate on your wound today.  The wound will be black or silver in appearance  for the next several days, this is normal.   Triad to renetta wound  1/4 inch iodoform to small area of depth   Opticell Ag   Dry Dressing  Change Daily or every other day depending on drainage     Right Heel:   Skin prep- renetta wound  Betadine  Mepilex Heel Border   Leave in place all week- if you notice drainage please remove     Please note, all wounds (unless stated otherwise here) were mechanically debrided at the time of cleansing here in the wound-care center today, so a small amount of pain, drainage or bleeding from that process might be expected, and is normal.      All products for home use, including multiple products for a single wound if applicable, are medically necessary in order to achieve the best chance at timely wound healing.  See provider documentation for details if needed.     Substituted dressings applied in the [address] [place] [address],[address] today, if applicable:     N/A     New orders for this week (labs, imaging, medications, etc.):  You may purchase Lac-Hydrin over the counter to use on dry skin on right foot   You may

## 2022-02-14 NOTE — PROGRESS NOTES
[facility] Progress Note    Annetta Crane     : 1970    DATE OF VISIT:  2/10/2022    Subjective:     Annetta Crane is a 46 y.o. male who has a pressure ulcer located on the left ischium. Significant symptoms or pertinent wound history since last visit: feeling well overall, no fevers, eating well, offloading very well, still has some bleeding at times during transfers, mostly just when he leaves his home to come here. Additional ulcer(s) noted? Yes; small pressure / friction lesion at the right heel, and he also has significantly elongated, thickened and dystrophic nails, and Dr. Ozzy Archer came down today to provide some nail care. His current medication list consists of [employer] Lubricath Catheter, Catheters, Depend Real-Fit Briefs for Men, Disposable Gloves, Disposable Liners, ammonium lactate, baclofen, bisacodyl, escitalopram, oxybutynin, rivaroxaban, and senna-docusate. Allergies: Patient has no known allergies.     Objective:     Vitals:    02/10/22 1518 02/10/22 1529   BP: 128/85    Pulse: 87    Resp: 18    Temp: 99.7 °F (37.6 °C)    TempSrc: Oral    Weight:  222 lb (100.7 kg)   Height:  6' 1\" (1.854 m)     Constitutional:  well-developed, well-nourished, NAD, overweight   Psychiatric:  oriented to person, place and time; mood and affect appropriate for the situation   Musculoskeletal:  no clubbing, cyanosis or petechiae; hips and pelvic area with no gross effusions, joint misalignment or acute arthritis  Skin: warm, dry, normal turgor; no rash   Left ischial pressure ulcer with periwound looking very healthy, pink, warm, perhaps just one small edge with a bit of friction, no bruising; wound bed itself is looking better also, basically all red and granular to peripherally hypergranular, smaller inferior area of undermining is resolved, but still a narrow area of depth, no fibrin or biofilm this week, no real necrosis; right heel with a small area of recent pressure or friction, now a thin eschar. Today's Wound Measurements, per RN documentation:  Wound 12/16/21 #1 Left Lower Buttocks, Pressure, Stage 4, Onset 10/2021-Wound Length (cm): 2.5 cm    Wound 12/16/21 #1 Left Lower Buttocks, Pressure, Stage 4, Onset 10/2021-Wound Width (cm): 1.2 cm    Wound 12/16/21 #1 Left Lower Buttocks, Pressure, Stage 4, Onset 10/2021-Wound Depth (cm): 2.4 cm    Assessment:     Patient Active Problem List   Diagnosis Code    [facility] S24.102A    Neurogenic bladder N31.9    Neurogenic bowel K59.2    Depression F32. A    Paraplegia ([facility]) G82.20    Pressure ulcer of left ischium, stage 4 ([facility]) L89.324    GERD (gastroesophageal reflux disease) K21.9    Hypergranulation L92.9       Assessment of today's active condition(s): Hx spinal cord injury, paraplegia, slowly healing stage 4 left ischial pressure ulcer; soft tissue infection resolved, no Hx of bone exposure or osteomyelitis; generally very well offloaded, just with some bleeding from trauma on transfers. Factors contributing to occurrence and/or persistence of the chronic ulcer include chronic pressure, decreased mobility and shear force. Medical necessity of today's visit is shown by the above documentation. Sharp debridement is not indicated today, based upon the exam findings in the wound(s) above. Procedure note:     Consent obtained. Time out performed per [facility]. Anesthetic  Anesthetic: 4% Lidocaine Cream     To encourage better epithelial cell coverage, I did use AgNO3 to chemically cauterize hypergranulation tissue on the left ischium ulcer(s). This was tolerated well, with no significant pain or skin injury. Discharge plan:     Treatment in the wound care center today, per RN documentation: Wound 12/16/21 #1 Left Lower Buttocks, Pressure, Stage 4, Onset 10/2021-Dressing/Treatment: Other (comment) (skn prep triad iodoform opticel ag mepilex border). Nail care per Dr. Mela Fabian.   For that right heel, we applied some Betadine and another Mepilex Heel Border dressing - leave that in place for as long as it stays, like last time. Continue the good work with offloading, careful transfers, protein intake. Home treatment: good handwashing before and after any dressing changes. Cleanse wound with saline or soap & water before dressing change. May use Vaseline (petrolatum), Aquaphor, Aveeno, CeraVe, Cetaphil, Eucerin, Lubriderm, etc for dry skin. Dressing type for home: Hypochlorous acid spray, Periwound zinc oxide, Iodoform packing (just into that one small area of depth), Silver alginate (moisten if wound becomes dry) and Dry cover dressing, every day or two, as needed. Written discharge instructions given to patient. Follow up in 2 weeks, but call sooner with concerns or questions.     Electronically signed by Lupe Neri MD on 2/14/2022 at 3:58 PM.

## 2022-02-15 ENCOUNTER — TELEPHONE (OUTPATIENT)
Dept: WOUND CARE | Age: 52
End: 2022-02-15

## 2022-02-23 DIAGNOSIS — S24.101A SPINAL CORD INJURY, T1-T6 (HCC): ICD-10-CM

## 2022-02-23 DIAGNOSIS — F32.A DEPRESSION, UNSPECIFIED DEPRESSION TYPE: ICD-10-CM

## 2022-02-23 RX ORDER — ESCITALOPRAM OXALATE 10 MG/1
TABLET ORAL
Qty: 90 TABLET | Refills: 3 | Status: SHIPPED | OUTPATIENT
Start: 2022-02-23

## 2022-02-25 ENCOUNTER — HOSPITAL ENCOUNTER (OUTPATIENT)
Dept: WOUND CARE | Age: 52
Discharge: HOME | End: 2022-02-25
Payer: COMMERCIAL

## 2022-02-25 VITALS
HEIGHT: 73 IN | WEIGHT: 229 LBS | TEMPERATURE: 98.6 F | SYSTOLIC BLOOD PRESSURE: 132 MMHG | RESPIRATION RATE: 16 BRPM | HEART RATE: 104 BPM | BODY MASS INDEX: 30.35 KG/M2 | DIASTOLIC BLOOD PRESSURE: 80 MMHG

## 2022-02-25 DIAGNOSIS — L89.612 PRESSURE ULCER OF RIGHT HEEL, STAGE 2 (HCC): ICD-10-CM

## 2022-02-25 DIAGNOSIS — L89.324 PRESSURE INJURY OF LEFT ISCHIUM, STAGE 4 (HCC): Primary | ICD-10-CM

## 2022-02-25 PROCEDURE — 17250 CHEM CAUT OF GRANLTJ TISSUE: CPT

## 2022-02-25 PROCEDURE — 17250 CHEM CAUT OF GRANLTJ TISSUE: CPT | Performed by: INTERNAL MEDICINE

## 2022-02-25 RX ORDER — LIDOCAINE HYDROCHLORIDE 40 MG/ML
SOLUTION TOPICAL ONCE
Status: CANCELLED | OUTPATIENT
Start: 2022-02-25 | End: 2022-02-25

## 2022-02-25 RX ORDER — BACITRACIN ZINC AND POLYMYXIN B SULFATE 500; 1000 [USP'U]/G; [USP'U]/G
OINTMENT TOPICAL ONCE
Status: CANCELLED | OUTPATIENT
Start: 2022-02-25 | End: 2022-02-25

## 2022-02-25 RX ORDER — LIDOCAINE 40 MG/G
CREAM TOPICAL ONCE
Status: CANCELLED | OUTPATIENT
Start: 2022-02-25 | End: 2022-02-25

## 2022-02-25 RX ORDER — LIDOCAINE 40 MG/G
CREAM TOPICAL ONCE
Status: DISCONTINUED | OUTPATIENT
Start: 2022-02-25 | End: 2022-02-26 | Stop reason: HOSPADM

## 2022-02-25 RX ORDER — LIDOCAINE 50 MG/G
OINTMENT TOPICAL ONCE
Status: CANCELLED | OUTPATIENT
Start: 2022-02-25 | End: 2022-02-25

## 2022-02-25 ASSESSMENT — PAIN SCALES - GENERAL: PAINLEVEL_OUTOF10: 0

## 2022-02-25 NOTE — PLAN OF CARE
No changes in overall wound care. Heel wound is healed and will have a dressing again this week to protect. Discharge instructions reviewed with patient, all questions answered, copy given to patient. Dressings were applied to all wounds per M.D. Instructions at this visit.

## 2022-02-25 NOTE — PROGRESS NOTES
[address] [facility] Physician Orders and Discharge [address], [address] [facility]  Telephone: [phone]      Fax: [phone]        Your home care [id number] [facility] .     Your wound-care supplies will be provided by: [facility] .     NAME: Jerardo Pascal  DATE of BIRTH:  1970  PRIMARY DIAGNOSIS FOR WOUND CARE CENTER:  Pressure Wound- Stage 4 .     Wound cleansing:   Do not scrub or use excessive force. Wash hands with soap and water before and after dressing changes. Prior to applying a clean dressing, cleanse wound with normal saline, wound cleanser, or mild soap and water. Ask your physician or nurse before getting the wound(s) wet in the shower.                Wound care for home:     Left Ischial Wound:   Dr. Bibiana Curtis used Silver Nitrate on your wound today.  The wound will be black or silver in appearance  for the next several days, this is normal.   Triad to renetta wound  1/4 inch iodoform to small area of depth   Opticell Ag   Dry Dressing  Change Daily or every other day depending on drainage     Right Heel:   Skin prep- renetta wound  Small \"dot\" of polysporin  Mepilex Heel Border   Leave in place all week- if you notice drainage please remove     Please note, all wounds (unless stated otherwise here) were mechanically debrided at the time of cleansing here in the wound-care center today, so a small amount of pain, drainage or bleeding from that process might be expected, and is normal.      All products for home use, including multiple products for a single wound if applicable, are medically necessary in order to achieve the best chance at timely wound healing.  See provider documentation for details if needed.     Substituted dressings applied in the [facility] today, if applicable:          New orders for this week (labs, imaging, medications, etc.):           Additional instructions for specific diagnoses:         F/U Appointment is with Dr. Dayne North 2 weeks on                                   at                       .     Your nurse  is KATIE Dawkins.      If we applied slip-resistant hospital socks today, be sure to remove them at least once a day to inspect your toes or feet, even if you're not changing the wraps or dressings underneath. If you see anything concerning (redness, excess moisture, etc), please call and let us know right away.     Should you experience any significant changes in your wound(s) (including redness, increased warmth, increased pain, increased drainage, odor, or fever) or have questions about your wound care, please contact the [employer] at [phone] Monday-Thursday from 8:00 am  4:30 pm, or Friday from 8:00 am - 2:30 pm.  If you need help with your wound outside these hours and cannot wait until we are again available, contact your home-care company (if applicable), your PCP, or go to the nearest emergency room.

## 2022-03-02 ENCOUNTER — HOSPITAL ENCOUNTER (OUTPATIENT)
Dept: WOUND CARE | Age: 52
Discharge: HOME | End: 2022-03-02

## 2022-03-09 ENCOUNTER — HOSPITAL ENCOUNTER (OUTPATIENT)
Dept: WOUND CARE | Age: 52
Discharge: HOME | End: 2022-03-09
Payer: COMMERCIAL

## 2022-03-09 VITALS
HEART RATE: 95 BPM | WEIGHT: 224.25 LBS | HEIGHT: 73 IN | BODY MASS INDEX: 29.72 KG/M2 | TEMPERATURE: 99.4 F | RESPIRATION RATE: 18 BRPM | SYSTOLIC BLOOD PRESSURE: 103 MMHG | DIASTOLIC BLOOD PRESSURE: 58 MMHG

## 2022-03-09 DIAGNOSIS — L89.324 PRESSURE INJURY OF LEFT ISCHIUM, STAGE 4 (HCC): Primary | ICD-10-CM

## 2022-03-09 PROCEDURE — 17250 CHEM CAUT OF GRANLTJ TISSUE: CPT | Performed by: INTERNAL MEDICINE

## 2022-03-09 PROCEDURE — 17250 CHEM CAUT OF GRANLTJ TISSUE: CPT

## 2022-03-09 RX ORDER — LIDOCAINE 40 MG/G
CREAM TOPICAL ONCE
Status: DISCONTINUED | OUTPATIENT
Start: 2022-03-09 | End: 2022-03-10 | Stop reason: HOSPADM

## 2022-03-09 RX ORDER — LIDOCAINE 40 MG/G
CREAM TOPICAL ONCE
Status: CANCELLED | OUTPATIENT
Start: 2022-03-09 | End: 2022-03-09

## 2022-03-09 RX ORDER — BACITRACIN ZINC AND POLYMYXIN B SULFATE 500; 1000 [USP'U]/G; [USP'U]/G
OINTMENT TOPICAL ONCE
Status: CANCELLED | OUTPATIENT
Start: 2022-03-09 | End: 2022-03-09

## 2022-03-09 RX ORDER — LIDOCAINE 50 MG/G
OINTMENT TOPICAL ONCE
Status: CANCELLED | OUTPATIENT
Start: 2022-03-09 | End: 2022-03-09

## 2022-03-09 RX ORDER — BACITRACIN ZINC AND POLYMYXIN B SULFATE 500; 1000 [USP'U]/G; [USP'U]/G
OINTMENT TOPICAL ONCE
Status: DISCONTINUED | OUTPATIENT
Start: 2022-03-09 | End: 2022-03-10 | Stop reason: HOSPADM

## 2022-03-09 RX ORDER — LIDOCAINE HYDROCHLORIDE 40 MG/ML
SOLUTION TOPICAL ONCE
Status: DISCONTINUED | OUTPATIENT
Start: 2022-03-09 | End: 2022-03-10 | Stop reason: HOSPADM

## 2022-03-09 RX ORDER — LIDOCAINE 50 MG/G
OINTMENT TOPICAL ONCE
Status: DISCONTINUED | OUTPATIENT
Start: 2022-03-09 | End: 2022-03-10 | Stop reason: HOSPADM

## 2022-03-09 RX ORDER — LIDOCAINE HYDROCHLORIDE 40 MG/ML
SOLUTION TOPICAL ONCE
Status: CANCELLED | OUTPATIENT
Start: 2022-03-09 | End: 2022-03-09

## 2022-03-09 ASSESSMENT — PAIN SCALES - GENERAL: PAINLEVEL_OUTOF10: 0

## 2022-03-09 NOTE — PLAN OF CARE
New skin tear noted at edge of left buttock wound. Pt. & aunt states this happened today when transferring from car to w/c. Original wound stable, cont. With current wound care regime with dressing changes. Right heel wound stable, no debridement, will change to using JumpStart & Hydrogel, cover with mepilex heel border. Patient & aunt questioning if they should space his wound care appts. Out longer d/t causing trauma & set backs on wound when he has to transfer to come to [facility]. Aunt states patient usually stays in bed most of the time at home & is only having trouble with transfers when coming to [facility]. F/u in [facility] in 3 weeks as ordered, pt. Aware to call sooner with any changes or questions/concerns. Discharge instructions reviewed with patient & aunt (caregiver), all questions answered, copy given to patient. Dressings were applied to all wounds per M.D. Instructions at this visit.

## 2022-03-09 NOTE — PLAN OF CARE
[address] [facility] Physician Orders and Discharge [address]  [address]  [place], [facility]  Telephone: [phone]      Fax: [phone] home care company:   [employer] . Your wound-care supplies will be provided by:  Home Care . NAME:  Antwon Sales   YOB: 1970  PRIMARY DIAGNOSIS FOR WOUND CARE CENTER:  Pressure Wound- Stage 4 . Wound cleansing:   Do not scrub or use excessive force. Wash hands with soap and water before and after dressing changes. Prior to applying a clean dressing, cleanse wound with normal saline, wound cleanser, or mild soap and water. Ask your physician or nurse before getting the wound(s) wet in the shower. Wound care for home:     Left Ischial Wound:   Dr. Eyad Patel used Silver Nitrate on your wound today. The wound will be black or silver in appearance  for the next several days, this is normal.   Collagen tucked into depth of wound per MD    Triad to renetta-wound  1/4 inch iodoform gently tucked into depth of wound  Opticell Ag   Dry Dressing  Change Daily or every other day depending on drainage     Right Heel:   Skin prep renetta-wound  JumpStart moistened with saline then Hydrogel to wound  Cover with Mepilex Heel Border   Leave in place all week. Keep clean, dry & intact. Please note, all wounds (unless stated otherwise here) were mechanically debrided at the time of cleansing here in the wound-care center today, so a small amount of pain, drainage or bleeding from that process might be expected, and is normal.      All products for home use, including multiple products for a single wound if applicable, are medically necessary in order to achieve the best chance at timely wound healing. See provider documentation for details if needed.      Substituted dressings applied in the [facility] today, if applicable:     N/a     New orders for this week (labs, imaging, medications, etc.):           Additional instructions for specific diagnoses:     General comments for pressure ulcers: *  Make sure you stay well-hydrated, and maintain good protein intake. *  Reposition at least every two hours, to keep from having pressure in one spot for too long. *  If you are not sure whether you have the best offloading surfaces (mattress, mattress overlay, chair cushion, heel-protector boots, etc), please ask. *  Moisturize your skin regularly with Vaseline, Aquaphor, Aveeno, CeraVe, Cetaphil, Eucerin, Lubriderm, etc; but keep the skin between your toes dry. *  If you smoke, your wound can not heal properly -- please talk with us when you're ready to quit. F/U Appointment is with Dr. Bibiana Curtis in 3 weeks on                                   at                       .     Your nurse  is Dieudonne Sloan RN. If we applied slip-resistant hospital socks today, be sure to remove them at least once a day to inspect your toes or feet, even if you're not changing the wraps or dressings underneath. If you see anything concerning (redness, excess moisture, etc), please call and let us know right away. Should you experience any significant changes in your wound(s) (including redness, increased warmth, increased pain, increased drainage, odor, or fever) or have questions about your wound care, please contact the [facility] at [phone] Monday-Thursday from 8:00 am  4:30 pm, or Friday from 8:00 am - 2:30 pm.  If you need help with your wound outside these hours and cannot wait until we are again available, contact your home-care company (if applicable), your PCP, or go to the nearest emergency room.

## 2022-03-10 PROBLEM — L89.612 PRESSURE ULCER OF RIGHT HEEL, STAGE 2 (HCC): Status: ACTIVE | Noted: 2022-03-10

## 2022-03-11 NOTE — PROGRESS NOTES
[facility] Progress Note    Maricarmen Mahan     : 1970    DATE OF VISIT:  2022    Subjective:     Maricarmen Mahan is a 46 y.o. male who has a pressure ulcer located on the left ischium and foot (right, heel). Significant symptoms or pertinent wound history since last visit: feeling well overall, no fever, eating well, doing fine with dressing changes; heel dressing survived the 2 weeks. Still having a good deal of wound bleeding whenever he transfers in and out of his car, to come for appointments, but only mild occasional bleeding during the week. Additional ulcer(s) noted? no.      His current medication list consists of [employer] Lubricath Catheter, Catheters, Depend Real-Fit Briefs for Men, Disposable Gloves, Disposable Liners, ammonium lactate, baclofen, bisacodyl, escitalopram, oxybutynin, rivaroxaban, and senna-docusate. Allergies: Patient has no known allergies. Objective:     Vitals:    22 1421   BP: 132/80   Pulse: 104   Resp: 16   Temp: 98.6 °F (37 °C)   TempSrc: Oral   Weight: 229 lb (103.9 kg)   Height: 6' 1\" (1.854 m)     Constitutional:  well-developed, well-nourished, NAD  Psychiatric:  oriented to person, place and time; mood and affect appropriate for the situation   Musculoskeletal:  no clubbing, cyanosis or petechiae; hips and pelvic area with no gross effusions, joint misalignment or acute arthritis  Skin: warm, dry, normal turgor; no rash   Left ischial pressure ulcer with periwound looking very healthy, pink, warm, minor signs of friction, no bruising; wound bed itself is looking better also, basically all red and granular to peripherally hypergranular, smaller inferior area of undermining is resolved, but still a narrow area of depth, no fibrin or biofilm this week, no real necrosis; right heel with a small area of recent pressure or friction, eschar lysed away, small open stage 2 ulcer there now.      Today's Wound Measurements, per RN documentation:  Wound 12/16/21 #1 Left Lower Buttocks, Pressure, Stage 4, Onset 10/2021-Wound Length (cm): 2.5 cm    Wound 12/16/21 #1 Left Lower Buttocks, Pressure, Stage 4, Onset 10/2021-Wound Width (cm): 1 cm    Wound 12/16/21 #1 Left Lower Buttocks, Pressure, Stage 4, Onset 10/2021-Wound Depth (cm): 1.4 cm    Assessment:     Patient Active Problem List   Diagnosis Code    T5 spinal cord injury ([facility] S24.102A    Neurogenic bladder N31.9    Neurogenic bowel K59.2    Depression F32. A    Paraplegia ([facility]) G82.20    Pressure ulcer of left ischium, stage 4 ([facility]) L89.324    GERD (gastroesophageal reflux disease) K21.9    Hypergranulation L92.9    Pressure ulcer of right heel, stage 2 ([facility]) L89.612       Assessment of today's active condition(s): Hx spinal cord injury, paraplegia, stage 4 ischial pressure ulcer, never had bone involvement, soft tissue infection resolved, just a bit of residual depth, but quite healthy; also a small stage 2 right heel pressure ulcer that ought to do fine with simple local care and offloading. Factors contributing to occurrence and/or persistence of the chronic ulcer include chronic pressure, decreased mobility and shear force. Medical necessity of today's visit is shown by the above documentation. Sharp debridement is not indicated today, based upon the exam findings in the wound(s) above. Procedure note:     Consent obtained. Time out performed per [facility] policy. Anesthetic  Anesthetic: 4% Lidocaine Cream     To encourage better epithelial cell coverage, I did use AgNO3 to chemically cauterize hypergranulation tissue on the left ischium ulcer(s). This was tolerated well, with no significant pain or skin injury. Discharge plan:     Treatment in the wound care center today, per RN documentation: Wound 12/16/21 #1 Left Lower Buttocks, Pressure, Stage 4, Onset 10/2021-Dressing/Treatment:  (triad, iodoform,AGAlg,mepilex border).     Keep heel dressing in place for the 2 weeks if possible (PSO, Mepilex Heel Border). Keep up the good work with protein intake, offloading, transfer technique. Home treatment: good handwashing before and after any dressing changes. Cleanse wound with saline or soap & water before dressing change. May use Vaseline (petrolatum), Aquaphor, Aveeno, CeraVe, Cetaphil, Eucerin, Lubriderm, etc for dry skin. Dressing type for home: for the ischium, periwound ZnO, iodoform gently into wound depth, silver algiante, cover dressing, every day or two, as needed. Written discharge instructions given to patient. Follow up in 2 weeks.     Electronically signed by Weston Huerta MD on 3/10/2022 at 8:07 PM.

## 2022-03-14 NOTE — PROGRESS NOTES
88 [facility] Progress Note    Fabiola Sierra     : 1970    DATE OF VISIT:  3/9/2022    Subjective:     Fabiola Sierra is a 46 y.o. male who has a pressure ulcer located on the left ischium and foot (right, heel). Significant symptoms or pertinent wound history since last visit: feeling ok overall, no fevers, eating well, doing very well staying offloaded at home, and the heel dressing survived for 2 weeks again. His aunt is again noticing that the wound tends to bleed to a very small degree at home, but whenever he transfers in and out of the car (which is basically only when he comes here), the wound bleeds a bit more, and there is clearly an area of skin-edge tearing present now, which I think probably just occurred today. Additional ulcer(s) noted? no.      His current medication list consists of Bardex Lubricath Catheter, Catheters, Depend Real-Fit Briefs for Men, Disposable Gloves, Disposable Liners, ammonium lactate, baclofen, bisacodyl, escitalopram, oxybutynin, rivaroxaban, and senna-docusate. Allergies: Patient has no known allergies.     Objective:     Vitals:    22 1421   BP: (!) 103/58   Pulse: 95   Resp: 18   Temp: 99.4 °F (37.4 °C)   TempSrc: Oral   Weight: 224 lb 4 oz (101.7 kg)   Height: 6' 1\" (1.854 m)     Constitutional:  well-developed, well-nourished, NAD  Psychiatric:  oriented to person, place and time; mood and affect appropriate for the situation   Musculoskeletal:  no clubbing, cyanosis or petechiae; hips and pelvic area with no gross effusions, joint misalignment or acute arthritis  Skin: warm, dry, normal turgor; no rash   Left ischial pressure ulcer with periwound looking mostly healthy, pink, warm, no bruising, but this week a bit of epidermal tearing from friction and trauma; wound bed itself is looking good, basically all red and granular to peripherally hypergranular, smaller inferior area of undermining is resolved, but still a narrow area of depth, no fibrin or biofilm this week, no real necrosis (I attempted to debride the portion I can't visualize, but no necrotic material or any debris was removed at all); right heel with a small area of recent pressure or friction, eschar lysed away, small open stage 2 ulcer there now, not yet healed.     Today's Wound Measurements, per RN documentation:  Wound 12/16/21 #1 Left Lower Buttocks, Pressure, Stage 4, Onset 10/2021-Wound Length (cm): 2.5 cm  Wound 03/09/22 #2 Right Posterior Heel, Pressure Stage 2, Onset 03/09/2022-Wound Length (cm): 0.4 cm    Wound 12/16/21 #1 Left Lower Buttocks, Pressure, Stage 4, Onset 10/2021-Wound Width (cm): 1 cm  Wound 03/09/22 #2 Right Posterior Heel, Pressure Stage 2, Onset 03/09/2022-Wound Width (cm): 0.4 cm    Wound 12/16/21 #1 Left Lower Buttocks, Pressure, Stage 4, Onset 10/2021-Wound Depth (cm): 2 cm  Wound 03/09/22 #2 Right Posterior Heel, Pressure Stage 2, Onset 03/09/2022-Wound Depth (cm): 0.1 cm    Assessment:     Patient Active Problem List   Diagnosis Code    [facility] ([facility] S24.102A    Neurogenic bladder N31.9    Neurogenic bowel K59.2    Depression F32. A    Paraplegia ([facility]) G82.20    Pressure ulcer of left ischium, stage 4 ([facility]) L89.324    GERD (gastroesophageal reflux disease) K21.9    Hypergranulation L92.9    Pressure ulcer of right heel, stage 2 ([facility]) L89.612       Assessment of today's active condition(s): Hx spinal cord injury, paraplegia, stage 4 ischial pressure ulcer, no osteo, prior soft tissue infection, much smaller and more granular, but still with a bit of central depth, and I think recurrent injury each time he comes in here unfortunately. Small stage 2 heel ulcer still open, but ought to do well. Factors contributing to occurrence and/or persistence of the chronic ulcer include chronic pressure, decreased mobility, shear force and anticoagulation therapy.  Medical necessity of today's visit is shown by the above documentation. Sharp debridement is not indicated today, based upon the exam findings in the wound(s) above. Procedure note:     Consent obtained. Time out performed per [facility] policy. Anesthetic  Anesthetic: 4% Lidocaine Cream     To encourage better epithelial cell coverage, I did use AgNO3 to chemically cauterize hypergranulation tissue on the left ischium ulcer(s). This was tolerated well, with no significant pain or skin injury. Discharge plan:     Treatment in the wound care center today, per RN documentation: Wound 12/16/21 #1 Left Lower Buttocks, Pressure, Stage 4, Onset 10/2021-Dressing/Treatment: Other (comment) (triad-renetta,1/4 iodofrom,opticell ag,dry dressing )  Wound 03/09/22 #2 Right Posterior Heel, Pressure Stage 2, Onset 03/09/2022-Dressing/Treatment: Other (comment) (skin prep,jump start,hydrogel,mepilex heel border). Leave the heel dressing in place for as long as it remains intact. Keep up the good work with offloading and protein intake. Home treatment: good handwashing before and after any dressing changes. Cleanse wound with saline or soap & water before dressing change. May use Vaseline (petrolatum), Aquaphor, Aveeno, CeraVe, Cetaphil, Eucerin, Lubriderm, etc for dry skin. Dressing type for home: as above for the ischium, every day or two, as needed. Written discharge instructions given to patient. Follow up in 3 weeks, seeing if an extra week of not transferring in and out of the car can make a difference with the healing progress.     Electronically signed by Giovany Lyon MD on 3/13/2022 at 8:19 PM.

## 2022-03-23 DIAGNOSIS — S24.101A SPINAL CORD INJURY, T1-T6 (HCC): Primary | ICD-10-CM

## 2022-03-23 RX ORDER — DIAZEPAM 2 MG/1
TABLET ORAL
Qty: 30 TABLET | Refills: 2 | Status: SHIPPED | OUTPATIENT
Start: 2022-03-23 | End: 2022-04-22

## 2022-03-30 ENCOUNTER — HOSPITAL ENCOUNTER (OUTPATIENT)
Dept: WOUND CARE | Age: 52
Discharge: HOME | End: 2022-03-30
Payer: COMMERCIAL

## 2022-03-30 VITALS
TEMPERATURE: 97.8 F | HEART RATE: 85 BPM | BODY MASS INDEX: 29.16 KG/M2 | RESPIRATION RATE: 18 BRPM | HEIGHT: 73 IN | DIASTOLIC BLOOD PRESSURE: 83 MMHG | SYSTOLIC BLOOD PRESSURE: 139 MMHG | WEIGHT: 220 LBS

## 2022-03-30 DIAGNOSIS — S24.101A SPINAL CORD INJURY, T1-T6 (HCC): ICD-10-CM

## 2022-03-30 DIAGNOSIS — L89.612 PRESSURE ULCER OF RIGHT HEEL, STAGE 2 (HCC): Primary | ICD-10-CM

## 2022-03-30 DIAGNOSIS — L92.9 HYPERGRANULATION: ICD-10-CM

## 2022-03-30 DIAGNOSIS — L89.324 PRESSURE INJURY OF LEFT ISCHIUM, STAGE 4 (HCC): ICD-10-CM

## 2022-03-30 PROCEDURE — 99213 OFFICE O/P EST LOW 20 MIN: CPT | Performed by: INTERNAL MEDICINE

## 2022-03-30 PROCEDURE — 99213 OFFICE O/P EST LOW 20 MIN: CPT

## 2022-03-30 RX ORDER — BACITRACIN ZINC AND POLYMYXIN B SULFATE 500; 1000 [USP'U]/G; [USP'U]/G
OINTMENT TOPICAL ONCE
Status: CANCELLED | OUTPATIENT
Start: 2022-03-30 | End: 2022-03-30

## 2022-03-30 RX ORDER — BACITRACIN ZINC AND POLYMYXIN B SULFATE 500; 1000 [USP'U]/G; [USP'U]/G
OINTMENT TOPICAL ONCE
Status: DISCONTINUED | OUTPATIENT
Start: 2022-03-30 | End: 2022-03-31 | Stop reason: HOSPADM

## 2022-03-30 RX ORDER — LIDOCAINE HYDROCHLORIDE 40 MG/ML
SOLUTION TOPICAL ONCE
Status: DISCONTINUED | OUTPATIENT
Start: 2022-03-30 | End: 2022-03-31 | Stop reason: HOSPADM

## 2022-03-30 RX ORDER — LIDOCAINE 40 MG/G
CREAM TOPICAL ONCE
Status: CANCELLED | OUTPATIENT
Start: 2022-03-30 | End: 2022-03-30

## 2022-03-30 RX ORDER — LIDOCAINE 40 MG/G
CREAM TOPICAL ONCE
Status: DISCONTINUED | OUTPATIENT
Start: 2022-03-30 | End: 2022-03-31 | Stop reason: HOSPADM

## 2022-03-30 RX ORDER — LIDOCAINE HYDROCHLORIDE 40 MG/ML
SOLUTION TOPICAL ONCE
Status: CANCELLED | OUTPATIENT
Start: 2022-03-30 | End: 2022-03-30

## 2022-03-30 RX ORDER — LIDOCAINE 50 MG/G
OINTMENT TOPICAL ONCE
Status: CANCELLED | OUTPATIENT
Start: 2022-03-30 | End: 2022-03-30

## 2022-03-30 RX ORDER — OXYBUTYNIN CHLORIDE 5 MG/1
TABLET ORAL
Qty: 270 TABLET | Refills: 0 | Status: SHIPPED | OUTPATIENT
Start: 2022-03-30 | End: 2022-06-23

## 2022-03-30 RX ORDER — LIDOCAINE 50 MG/G
OINTMENT TOPICAL ONCE
Status: DISCONTINUED | OUTPATIENT
Start: 2022-03-30 | End: 2022-03-31 | Stop reason: HOSPADM

## 2022-03-30 ASSESSMENT — PAIN SCALES - GENERAL
PAINLEVEL_OUTOF10: 0
PAINLEVEL_OUTOF10: 0

## 2022-03-30 NOTE — PLAN OF CARE
Right heel wound healed, cont. To protect with mepilex heel border for an additional week. Left ischial wound red & granular, but friable, bleeding noted from new skin tears along wound edge d/t trauma with transfers when coming to appt. Today. No debridement today. Dr Eric Booth discussed possibly consulting with plastic surgeon to discuss possible flap surgery. Pt. Wants to hold off at this time. Will cont. With current wound care regime with dressings. Will add using rolled gauze bolster to use on wound care visit days to try & help protect from trauma with friction/shear. F/u in [facility] in 2 weeks as ordered, pt. Aware to call sooner with any changes or questions/concerns. Discharge instructions reviewed with patient & aunt (caregiver), all questions answered, copy given to patient. Dressings were applied to all wounds per M.D. Instructions at this visit.

## 2022-03-30 NOTE — PLAN OF CARE
[address] [facility] Physician Orders and Discharge [address], 26 [facility], [facility]  Telephone: [phone]      Fax: [phone]        Your home care [address] [address] [facility] .     Your wound-care supplies will be provided by: [facility] .     NAME: Padmini Triana  DATE of BIRTH:  1970  PRIMARY DIAGNOSIS FOR WOUND CARE CENTER:  Pressure Wound- Stage 4 .     Wound cleansing:   Do not scrub or use excessive force. Wash hands with soap and water before and after dressing changes. Prior to applying a clean dressing, cleanse wound with normal saline, wound cleanser, or mild soap and water. Ask your physician or nurse before getting the wound(s) wet in the shower.                Wound care for home:     Left Ischial Wound:   Dr. Andreea Bella used Silver Nitrate on your wound today.  The wound will be black or silver in appearance  for the next several days, this is normal.   Collagen tucked into depth of wound per MD     Triad to renetta-wound  1/4 inch iodoform gently tucked into depth of wound  Opticell Ag   Rolled gauze (approx. 80% of 2\" danica roll) DO THIS TODAY IN [facility] & THEN ON DAY OF NEXT WOUND CARE APPT. Dry Dressing  Change Daily or every other day depending on drainage     Right Heel:   Skin prep renetta-wound  JumpStart moistened with saline then Hydrogel to wound  Cover with Mepilex Heel Border   Leave in place all week. Keep clean, dry & intact.        Please note, all wounds (unless stated otherwise here) were mechanically debrided at the time of cleansing here in the wound-care center today, so a small amount of pain, drainage or bleeding from that process might be expected, and is normal.      All products for home use, including multiple products for a single wound if applicable, are medically necessary in order to achieve the best chance at timely wound healing.  See provider documentation for details if needed.     Substituted dressings applied in the [address] [place] [address],[address] today, if applicable:     N/a     New orders for this week (labs, imaging, medications, etc.):           Additional instructions for specific diagnoses:     General comments for pressure ulcers: *  Make sure you stay well-hydrated, and maintain good protein intake. *  Reposition at least every two hours, to keep from having pressure in one spot for too long. *  If you are not sure whether you have the best offloading surfaces (mattress, mattress overlay, chair cushion, heel-protector boots, etc), please ask. *  Moisturize your skin regularly with Vaseline, Aquaphor, Aveeno, CeraVe, Cetaphil, Eucerin, Lubriderm, etc; but keep the skin between your toes dry. *  If you smoke, your wound can not heal properly -- please talk with us when you're ready to quit.         F/U Appointment is with Dr. Ingrid Mccurdy 2 weeks on                                   at                       .     Your nurse  is KATIE Dawkins.      If we applied slip-resistant hospital socks today, be sure to remove them at least once a day to inspect your toes or feet, even if you're not changing the wraps or dressings underneath. If you see anything concerning (redness, excess moisture, etc), please call and let us know right away.     Should you experience any significant changes in your wound(s) (including redness, increased warmth, increased pain, increased drainage, odor, or fever) or have questions about your wound care, please [facility] the [email or web address] at [phone] Monday-Thursday from 8:00 am  4:30 pm, or Friday from 8:00 am - 2:30 pm.  If you need help with your wound outside these hours and cannot wait until we are again available, contact your home-care company (if applicable), your PCP, or go to the nearest emergency room.

## 2022-04-06 ENCOUNTER — HOSPITAL ENCOUNTER (OUTPATIENT)
Dept: WOUND CARE | Age: 52
Discharge: HOME | End: 2022-04-06
Payer: COMMERCIAL

## 2022-04-06 VITALS
HEIGHT: 73 IN | DIASTOLIC BLOOD PRESSURE: 84 MMHG | TEMPERATURE: 98.9 F | HEART RATE: 94 BPM | SYSTOLIC BLOOD PRESSURE: 156 MMHG | RESPIRATION RATE: 20 BRPM | BODY MASS INDEX: 29.03 KG/M2

## 2022-04-06 DIAGNOSIS — L89.612 PRESSURE ULCER OF RIGHT HEEL, STAGE 2 (HCC): Primary | ICD-10-CM

## 2022-04-06 DIAGNOSIS — L89.324 PRESSURE INJURY OF LEFT ISCHIUM, STAGE 4 (HCC): ICD-10-CM

## 2022-04-06 DIAGNOSIS — L92.9 HYPERGRANULATION: ICD-10-CM

## 2022-04-06 PROCEDURE — 97597 DBRDMT OPN WND 1ST 20 CM/<: CPT | Performed by: INTERNAL MEDICINE

## 2022-04-06 PROCEDURE — 97597 DBRDMT OPN WND 1ST 20 CM/<: CPT

## 2022-04-06 RX ORDER — LIDOCAINE HYDROCHLORIDE 40 MG/ML
SOLUTION TOPICAL ONCE
Status: CANCELLED | OUTPATIENT
Start: 2022-04-06 | End: 2022-04-06

## 2022-04-06 RX ORDER — LIDOCAINE 50 MG/G
OINTMENT TOPICAL ONCE
Status: DISCONTINUED | OUTPATIENT
Start: 2022-04-06 | End: 2022-04-07 | Stop reason: HOSPADM

## 2022-04-06 RX ORDER — LIDOCAINE 50 MG/G
OINTMENT TOPICAL ONCE
Status: CANCELLED | OUTPATIENT
Start: 2022-04-06 | End: 2022-04-06

## 2022-04-06 RX ORDER — LIDOCAINE 40 MG/G
CREAM TOPICAL ONCE
Status: CANCELLED | OUTPATIENT
Start: 2022-04-06 | End: 2022-04-06

## 2022-04-06 RX ORDER — BACITRACIN ZINC AND POLYMYXIN B SULFATE 500; 1000 [USP'U]/G; [USP'U]/G
OINTMENT TOPICAL ONCE
Status: CANCELLED | OUTPATIENT
Start: 2022-04-06 | End: 2022-04-06

## 2022-04-06 RX ORDER — LIDOCAINE 40 MG/G
CREAM TOPICAL ONCE
Status: DISCONTINUED | OUTPATIENT
Start: 2022-04-06 | End: 2022-04-07 | Stop reason: HOSPADM

## 2022-04-06 RX ORDER — LIDOCAINE HYDROCHLORIDE 40 MG/ML
SOLUTION TOPICAL ONCE
Status: DISCONTINUED | OUTPATIENT
Start: 2022-04-06 | End: 2022-04-07 | Stop reason: HOSPADM

## 2022-04-06 RX ORDER — BACITRACIN ZINC AND POLYMYXIN B SULFATE 500; 1000 [USP'U]/G; [USP'U]/G
OINTMENT TOPICAL ONCE
Status: DISCONTINUED | OUTPATIENT
Start: 2022-04-06 | End: 2022-04-07 | Stop reason: HOSPADM

## 2022-04-06 ASSESSMENT — PAIN SCALES - GENERAL: PAINLEVEL_OUTOF10: 0

## 2022-04-06 NOTE — PLAN OF CARE
Wound stable & showing improvement, tunnel with less depth this week. Debridement & Ag Nitrate per MD & pt. Tolerated well. Patient & aunt both seem to thing the rolled gauze bolster helped to relieve trauma to wound when transferring. Will cont. with current wound care regime with dressings. Pt. Is continuing with good off-loading & frequent repositioning. F/u in [facility] in 1 week as ordered, pt. Aware to call sooner with any changes or questions/concerns. Discharge instructions reviewed with patient & aunt, all questions answered, copy given to patient. Dressings were applied to all wounds per M.D. Instructions at this visit.

## 2022-04-07 DIAGNOSIS — S24.101A SPINAL CORD INJURY, T1-T6 (HCC): ICD-10-CM

## 2022-04-07 RX ORDER — BACLOFEN 10 MG/1
TABLET ORAL
Qty: 180 TABLET | Refills: 0 | Status: SHIPPED | OUTPATIENT
Start: 2022-04-07 | End: 2022-07-05

## 2022-04-07 NOTE — PLAN OF CARE
215 [facility] Physician Orders and Discharge 800 [facility]  [address], 49 [facility], [facility]  Telephone: [phone]      Fax: [phone]        Your home care [address] [address] [facility] .     Your wound-care supplies will be provided by: [facility] .     NAME: Og Lomeli  DATE of BIRTH:  1970  PRIMARY DIAGNOSIS FOR WOUND CARE CENTER:  Pressure Wound- Stage 4 .     Wound cleansing:   Do not scrub or use excessive force. Wash hands with soap and water before and after dressing changes. Prior to applying a clean dressing, cleanse wound with normal saline, wound cleanser, or mild soap and water. Ask your physician or nurse before getting the wound(s) wet in the shower.                Wound care for home:     Left Ischial Wound:   Triad to renetta-wound (not into wound)  1/4 inch iodoform gently tucked into depth of wound, until you meet resistance, then you may discotinue  Calxeda Ag over wound    Rolled gauze (approx. 80% of 2\" danica roll) DO THIS TODAY IN [facility] & THEN ON DAY OF NEXT WOUND CARE APPT. Or WHEN TRANSFERRING  Cover with larger mepilex border or bordered gauze  Change Daily or every other day depending on drainage        Please note, all wounds (unless stated otherwise here) were mechanically debrided at the time of cleansing here in the wound-care center today, so a small amount of pain, drainage or bleeding from that process might be expected, and is normal.      All products for home use, including multiple products for a single wound if applicable, are medically necessary in order to achieve the best chance at timely wound healing. See provider documentation for details if needed.     Substituted dressings applied in the [facility] today, if applicable:     N/a     New orders for this week (labs, imaging, medications, etc.):     Apply rolled gauze bolster over wound on the day of your next appt.  To possibly help protect from friction/shear when transferring. You may call in 2 weeks, if wound is looking good & progressing well, then we can move your appt. Out to 3 weeks.      Additional instructions for specific diagnoses:     General comments for pressure ulcers:  *  Make sure you stay well-hydrated, and maintain good protein intake. *  Reposition at least every two hours, to keep from having pressure in one spot for too long. *  If you are not sure whether you have the best offloading surfaces (mattress, mattress overlay, chair cushion, heel-protector boots, etc), please ask. *  Moisturize your skin regularly with Vaseline, Aquaphor, Aveeno, CeraVe, Cetaphil, Eucerin, Lubriderm, etc; but keep the skin between your toes dry. *  If you smoke, your wound can not heal properly -- please talk with us when you're ready to quit.         F/U Appointment is with Dr. Anastasia Townsend 2 weeks on                                   at                       .     Your nurse  is Mariza Pena RN.      If we applied slip-resistant hospital socks today, be sure to remove them at least once a day to inspect your toes or feet, even if you're not changing the wraps or dressings underneath. If you see anything concerning (redness, excess moisture, etc), please call and let us know right away.     Should you experience any significant changes in your wound(s) (including redness, increased warmth, increased pain, increased drainage, odor, or fever) or have questions about your wound care, please contact the [place] [address] at [phone] Monday-Thursday from 8:00 am - 4:30 pm, or Friday from 8:00 am - 2:30 pm.  If you need help with your wound outside these hours and cannot wait until we are again available, contact your home-care company (if applicable), your PCP, or go to the nearest emergency room.

## 2022-04-11 DIAGNOSIS — Z86.718 PERSONAL HISTORY OF DVT (DEEP VEIN THROMBOSIS): ICD-10-CM

## 2022-04-11 NOTE — PROGRESS NOTES
[address] Progress Note    Nanda Stovall     : 1970    DATE OF VISIT:  2022    Subjective:     Nanda Stovall is a 46 y.o. male who has a pressure ulcer located on the left ischium. Significant symptoms or pertinent wound history since last visit: feeling ok overall, no fever, eating well, staying very well offloaded at home. It sounds like the rolled gauze bolsters have worked pretty well to offer some extra protection against trauma and friction with transfers in and out of his car, as there wasn't as much bleeding last week after he got home, or today after he got in here. Wound depth seems more narrow to his family also. Additional ulcer(s) noted? no. Heel is healed. His current medication list consists of Bardex Lubricath Catheter, Catheters, Depend Real-Fit Briefs for Men, Disposable Gloves, Disposable Liners, ammonium lactate, baclofen, bisacodyl, diazePAM, escitalopram, oxybutynin, rivaroxaban, and senna-docusate. Allergies: Patient has no known allergies.     Objective:     Vitals:    22 1524   BP: (!) 156/84   Pulse: 94   Resp: 20   Temp: 98.9 °F (37.2 °C)   TempSrc: Oral   Height: 6' 1\" (1.854 m)     Constitutional:  well-developed, well-nourished, NAD  Psychiatric:  oriented to person, place and time; mood and affect appropriate for the situation   Musculoskeletal:  no clubbing, cyanosis or petechiae; hips and pelvic area with no gross effusions, joint misalignment or acute arthritis  Skin: warm, dry, normal turgor; no rash, minor area of pressure/friction/moisture erythema at left superior buttock     Left ischial pressure ulcer with periwound looking mostly healthy, pink, warm, no bruising, a bit of epidermal necrosis that I think followed the friction trauma last week, as this week the wound bed itself is looking good, basically all red and granular, not actively bleeding like usual, no obvious new skin-edge trauma, still a narrow area of depth (getting more narrow and I think less deep), no fibrin or biofilm this week, no real necrosis; right heel delicately healed.     Today's wound measurements, per RN documentation:  Wound 12/16/21 #1 Left Lower Buttocks, Pressure, Stage 4, Onset 10/2021-Wound Length (cm): 2.8 cm    Wound 12/16/21 #1 Left Lower Buttocks, Pressure, Stage 4, Onset 10/2021-Wound Width (cm): 1.4 cm    Wound 12/16/21 #1 Left Lower Buttocks, Pressure, Stage 4, Onset 10/2021-Wound Depth (cm): 1.7 cm (remeasured per MD)    Assessment:     Patient Active Problem List   Diagnosis Code    T5 spinal cord injury ([facility] S24.102A    Neurogenic bladder N31.9    Neurogenic bowel K59.2    Depression F32. A    Paraplegia ([facility]) G82.20    Pressure ulcer of left ischium, stage 4 ([facility]) L89.324    GERD (gastroesophageal reflux disease) K21.9    Hypergranulation L92.9    Pressure ulcer of right heel, stage 2 ([facility]) L89.612       Assessment of today's active condition(s): Hx spinal trauma, paraplegia, healing stage 4 left ischial pressure ulcer, I think less wound-edge trauma and bleeding this last week after a minor change in local care. No signs of infection. Factors contributing to occurrence and/or persistence of the chronic ulcer include chronic pressure, decreased mobility and shear force. Medical necessity of today's visit is shown by the above documentation. Sharp debridement is indicated today, based upon the exam findings in the wound(s) above. Procedure note:     Consent obtained. Time out performed per [facility] policy. Anesthetic  Anesthetic: 4% Lidocaine Cream     Using a scissors and forceps, I sharply debrided the left ischium ulcer(s) down through and including the removal of epidermis. The type(s) of tissue debrided included necrotic/eschar. Total Surface Area Debrided: just 1-2 sq cm. The ulcers were then irrigated with normal saline solution.  The procedure was completed with a small amount of bleeding, and hemostasis was with pressure and with silver nitrate stick(s). The patient tolerated the procedure well, with no significant complications. The patient's level of pain during and after the procedure was monitored. Post-debridement measurements, if different from pre-debridement, are in the flowsheet as well. Discharge plan:     Treatment in the wound care center today, per RN documentation: Wound 12/16/21 #1 Left Lower Buttocks, Pressure, Stage 4, Onset 10/2021-Dressing/Treatment:  (Triad(renetta), iodoform,AGAlg,Eric bolster,mepillex border). Home treatment: good handwashing before and after any dressing changes. Cleanse wound with saline or soap & water before dressing change. May use Vaseline (petrolatum), Aquaphor, Aveeno, CeraVe, Cetaphil, Eucerin, Lubriderm, etc for dry skin. Dressing type for home: as above, just using that rolled Eric bolster when leaving the house, but otherwise, once daily. Written discharge instructions given to patient. Follow up in 2-3 weeks.     Electronically signed by Emiliana Myles MD on 4/11/2022 at 2:07 PM.

## 2022-04-11 NOTE — PROGRESS NOTES
[facility] Progress Note    Leslye Sicard     : 1970    DATE OF VISIT:  3/30/2022    Subjective:     Leslye Sicard is a 46 y.o. male who has a pressure ulcer located on the left ischium. Current complaint of pain in this ulcer? no.    Other significant symptoms or pertinent ulcer history: he continues to have a small amount of wound bleeding at home from time to time, but still has significant bleeding whenever he leaves his home, from transfers in and out of his car, even with the [employer]. No F/C/D, no wound malodor. Minor area of nearby buttock pressure or friction erythema. Additional ulcer(s) noted? no. That right heel looks delicately healed now. Mr. Skyler Olson has a past medical history of Cellulitis and abscess of buttock, Clavicular fracture, closed, shaft, Closed fracture of multiple ribs, Closed fracture of scapula, DVT of leg (deep venous thrombosis) ([address] Hemothorax, traumatic, Hypertension, Injury, crush, back, Pneumothorax, closed, traumatic, Pulmonary embolism without acute cor pulmonale ([address] SAH (subarachnoid hemorrhage) ([address] Spinal cord injury, thoracic (T1-T6) ([address] Stage II pressure ulcer of left buttock ([address] and T6 vertebral fracture ([address] He has a past surgical history that includes back surgery and fracture surgery. His family history includes Coronary Art Dis in his maternal grandfather; Dementia in his maternal grandmother; No Known Problems in his father, mother, paternal grandfather, and sister; Tuberculosis in his paternal grandmother. Mr. Skyler Olson reports that he has never smoked. He has never used smokeless tobacco. He reports that he does not drink alcohol and does not use drugs.     His current medication list consists of Bardex Lubricath Catheter, Catheters, Depend Real-Fit Briefs for Men, Disposable Gloves, Disposable Liners, ammonium lactate, baclofen, bisacodyl, diazePAM, escitalopram, oxybutynin, rivaroxaban, and senna-docusate. Allergies: Patient has no known allergies. Pertinent items from the review of systems are discussed in the HPI; the remainder of the ROS was reviewed and is negative.      Objective:     Vitals:    03/30/22 1448   BP: 139/83   Pulse: 85   Resp: 18   Temp: 97.8 °F (36.6 °C)   TempSrc: Oral   Weight: 220 lb (99.8 kg)   Height: 6' 1\" (1.854 m)       Constitutional:  well-developed, well-nourished, NAD  Psychiatric:  oriented to person, place and time; mood and affect appropriate for the situation   Musculoskeletal:  no clubbing, cyanosis or petechiae; hips and pelvic area with no gross effusions, joint misalignment or acute arthritis  Skin: warm, dry, normal turgor; no rash, minor area of pressure/friction/moisture erythema at left superior buttock    Left ischial pressure ulcer with periwound looking mostly healthy, pink, warm, no bruising, but this week recurrent epidermal tearing from friction and trauma; wound bed itself is looking good, basically all red and granular, smaller inferior area of undermining is resolved, but still a narrow area of depth, no fibrin or biofilm this week, no real necrosis; right heel delicately healed.     Today's Wound Measurements, per RN documentation:  Wound 12/16/21 #1 Left Lower Buttocks, Pressure, Stage 4, Onset 10/2021-Wound Length (cm): 3 cm  [REMOVED] Wound 03/09/22 #2 Right Posterior Heel, Pressure Stage 2, Onset 03/09/2022-Wound Length (cm): 0 cm    Wound 12/16/21 #1 Left Lower Buttocks, Pressure, Stage 4, Onset 10/2021-Wound Width (cm): 1.6 cm  [REMOVED] Wound 03/09/22 #2 Right Posterior Heel, Pressure Stage 2, Onset 03/09/2022-Wound Width (cm): 0 cm    Wound 12/16/21 #1 Left Lower Buttocks, Pressure, Stage 4, Onset 10/2021-Wound Depth (cm): 2.5 cm  [REMOVED] Wound 03/09/22 #2 Right Posterior Heel, Pressure Stage 2, Onset 03/09/2022-Wound Depth (cm): 0 cm  ______________________________    Lab Results   Component Value Date    LUCINDA 3.2 (L) 12/07/2021     Lab Results   Component Value Date    CREATININE 0.9 12/07/2021     Lab Results   Component Value Date    HGB 13.6 12/07/2021     No results found for: LABA1C  Assessment:     Patient Active Problem List   Diagnosis Code    T5 spinal cord injury ([facility] [address] S24.102A    Neurogenic bladder N31.9    Neurogenic bowel K59.2    Depression F32. A    Paraplegia ([facility]) G82.20    Pressure ulcer of left ischium, stage 4 ([facility]) L89.324    GERD (gastroesophageal reflux disease) K21.9    Hypergranulation L92.9    Pressure ulcer of right heel, stage 2 ([facility]) L89.612       Assessment of today's active condition(s): Hx spinal injury, paraplegia, recent period of imperfect attention to offloading, development of stage 4 left ischial pressure ulcer, complicated by soft tissue infection but no osteo, made good progress toward healing for a couple of months, but recently stalled, I think with slow progress at home during the week, but then re-injury whenever he comes in here, from automobile transfers. Nearby buttock skin irritation not a real concern, and the heel is doing well. Factors contributing to occurrence and/or persistence of the chronic ulcer include chronic pressure, decreased mobility and shear force. Medical necessity of today's visit is shown by the above documentation. Sharp debridement is not indicated today, based upon the exam findings in the wound(s) above. Discharge plan:     Treatment in the wound care center today, per RN documentation: Wound 12/16/21 #1 Left Lower Buttocks, Pressure, Stage 4, Onset 10/2021-Dressing/Treatment: Other (comment) (1/4 in. Iodoform, Opticel Ag, 3/4 of toe [address] [facility])  [REMOVED] Wound 03/09/22 #2 Right Posterior Heel, Pressure Stage 2, Onset 03/09/2022-Dressing/Treatment: Other (comment) (Mepilex Heel Border). Home treatment: good handwashing before and after any dressing changes. Cleanse ulcer with saline or soap & water before dressing change.  May use Vaseline (petrolatum), Aquaphor, Aveeno, CeraVe, Cetaphil, Eucerin, Lubriderm, etc for dry skin. Dressing type for home: as above for the left ischium in general, daily, but then whenever he's going to leave the home, add that rolled Eric bolster before the cover dressing, as a barrier against recurrent trauma. Written discharge instructions given to patient. Follow up in 1 week, so that we can see if that new gauze bolster technique seems to be working. Not sure what my other option would be -- perhaps ambulance transport back and forth to see me? Or find someone who can do home visits. Keep up the great work with protein intake and offloading at home.     Electronically signed by Aileen Salcedo MD on 4/11/2022 at 2:00 PM.

## 2022-04-13 DIAGNOSIS — S24.101A SPINAL CORD INJURY, T1-T6 (HCC): ICD-10-CM

## 2022-04-13 RX ORDER — AMOXICILLIN 250 MG
CAPSULE ORAL
Qty: 30 TABLET | Refills: 5 | Status: SHIPPED | OUTPATIENT
Start: 2022-04-13

## 2022-04-20 ENCOUNTER — HOSPITAL ENCOUNTER (OUTPATIENT)
Dept: WOUND CARE | Age: 52
Discharge: HOME | End: 2022-04-20

## 2022-04-22 DIAGNOSIS — S24.101A SPINAL CORD INJURY, T1-T6 (HCC): ICD-10-CM

## 2022-04-22 RX ORDER — BACLOFEN 20 MG/1
TABLET ORAL
Qty: 90 TABLET | Refills: 0 | Status: SHIPPED | OUTPATIENT
Start: 2022-04-22 | End: 2022-07-18

## 2022-04-25 DIAGNOSIS — S24.101A SPINAL CORD INJURY, T1-T6 (HCC): ICD-10-CM

## 2022-04-25 RX ORDER — BISACODYL 10 MG
SUPPOSITORY, RECTAL RECTAL
Qty: 30 SUPPOSITORY | Refills: 0 | Status: SHIPPED | OUTPATIENT
Start: 2022-04-25 | End: 2022-06-08

## 2022-04-27 ENCOUNTER — HOSPITAL ENCOUNTER (OUTPATIENT)
Dept: WOUND CARE | Age: 52
Discharge: HOME | End: 2022-04-27
Payer: COMMERCIAL

## 2022-04-27 VITALS
RESPIRATION RATE: 20 BRPM | DIASTOLIC BLOOD PRESSURE: 71 MMHG | BODY MASS INDEX: 29.06 KG/M2 | TEMPERATURE: 98.8 F | HEIGHT: 73 IN | SYSTOLIC BLOOD PRESSURE: 126 MMHG | HEART RATE: 80 BPM | WEIGHT: 219.25 LBS

## 2022-04-27 DIAGNOSIS — L89.324 PRESSURE INJURY OF LEFT ISCHIUM, STAGE 4 (HCC): ICD-10-CM

## 2022-04-27 DIAGNOSIS — L92.9 HYPERGRANULATION: ICD-10-CM

## 2022-04-27 DIAGNOSIS — L89.612 PRESSURE ULCER OF RIGHT HEEL, STAGE 2 (HCC): Primary | ICD-10-CM

## 2022-04-27 PROCEDURE — 17250 CHEM CAUT OF GRANLTJ TISSUE: CPT

## 2022-04-27 PROCEDURE — 97597 DBRDMT OPN WND 1ST 20 CM/<: CPT | Performed by: INTERNAL MEDICINE

## 2022-04-27 PROCEDURE — 97597 DBRDMT OPN WND 1ST 20 CM/<: CPT

## 2022-04-27 RX ORDER — LIDOCAINE 40 MG/G
CREAM TOPICAL ONCE
Status: CANCELLED | OUTPATIENT
Start: 2022-04-27 | End: 2022-04-27

## 2022-04-27 RX ORDER — BACITRACIN ZINC AND POLYMYXIN B SULFATE 500; 1000 [USP'U]/G; [USP'U]/G
OINTMENT TOPICAL ONCE
Status: CANCELLED | OUTPATIENT
Start: 2022-04-27 | End: 2022-04-27

## 2022-04-27 RX ORDER — BACITRACIN ZINC AND POLYMYXIN B SULFATE 500; 1000 [USP'U]/G; [USP'U]/G
OINTMENT TOPICAL ONCE
Status: DISCONTINUED | OUTPATIENT
Start: 2022-04-27 | End: 2022-04-28 | Stop reason: HOSPADM

## 2022-04-27 RX ORDER — LIDOCAINE 50 MG/G
OINTMENT TOPICAL ONCE
Status: DISCONTINUED | OUTPATIENT
Start: 2022-04-27 | End: 2022-04-28 | Stop reason: HOSPADM

## 2022-04-27 RX ORDER — LIDOCAINE HYDROCHLORIDE 40 MG/ML
SOLUTION TOPICAL ONCE
Status: DISCONTINUED | OUTPATIENT
Start: 2022-04-27 | End: 2022-04-28 | Stop reason: HOSPADM

## 2022-04-27 RX ORDER — LIDOCAINE 50 MG/G
OINTMENT TOPICAL ONCE
Status: CANCELLED | OUTPATIENT
Start: 2022-04-27 | End: 2022-04-27

## 2022-04-27 RX ORDER — LIDOCAINE HYDROCHLORIDE 40 MG/ML
SOLUTION TOPICAL ONCE
Status: CANCELLED | OUTPATIENT
Start: 2022-04-27 | End: 2022-04-27

## 2022-04-27 RX ORDER — LIDOCAINE 40 MG/G
CREAM TOPICAL ONCE
Status: DISCONTINUED | OUTPATIENT
Start: 2022-04-27 | End: 2022-04-28 | Stop reason: HOSPADM

## 2022-04-27 NOTE — PLAN OF CARE
[address] [facility] Physician Orders and Discharge 800 [facility]  [address] [facility], [address] [facility], [facility]  Telephone: [phone]      Fax: [phone]        Your home care [address] [facility] .     Your wound-care supplies will be provided by: [facility] .     NAME: Daisha Crandall  DATE of BIRTH:  1970  PRIMARY DIAGNOSIS FOR WOUND CARE CENTER:  Pressure Wound- Stage 4 .     Wound cleansing:   Do not scrub or use excessive force. Wash hands with soap and water before and after dressing changes. Prior to applying a clean dressing, cleanse wound with normal saline, wound cleanser, or mild soap and water. Ask your physician or nurse before getting the wound(s) wet in the shower.                Wound care for home:     Right Heel:  Dr. Jayant Zamora used Silver Nitrate on your wound today. The wound will be black or silver in appearance  for the next several days, this is normal.     PSO to wound  Cover with mepilex heel border   Leave in place. Wear the off-loading boots to help prevent pressure on heel. Apply Calmoseptine or Triad mixed with antifungal powder to rash on buttocks  Reapply as daily or needed. Left Ischial Wound:   Triad to renetta-wound (not into wound)  [employer] Ag over wound    Rolled gauze (approx. 80% of 2\" danica roll) DO THIS TODAY IN [facility] & THEN ON DAY OF NEXT WOUND CARE APPT. Or WHEN TRANSFERRING  Cover with larger mepilex border or bordered gauze  Change Daily or every other day depending on drainage        Please note, all wounds (unless stated otherwise here) were mechanically debrided at the time of cleansing here in the wound-care center today, so a small amount of pain, drainage or bleeding from that process might be expected, and is normal.      All products for home use, including multiple products for a single wound if applicable, are medically necessary in order to achieve the best chance at timely wound healing.  See provider documentation for details if needed.     Substituted dressings applied in the [address] [place] [address],[address] today, if applicable:     N/a     New orders for this week (labs, imaging, medications, etc.):     Script for LacHydrin sent to pharmacy per Dr Walker Rocha 4/27/2022. Apply rolled gauze bolster over wound on the day of your next appt. To possibly help protect from friction/shear when transferring.       Additional instructions for specific diagnoses:     General comments for pressure ulcers:  *  Make sure you stay well-hydrated, and maintain good protein intake. *  Reposition at least every two hours, to keep from having pressure in one spot for too long. *  If you are not sure whether you have the best offloading surfaces (mattress, mattress overlay, chair cushion, heel-protector boots, etc), please ask. *  Moisturize your skin regularly with Vaseline, Aquaphor, Aveeno, CeraVe, Cetaphil, Eucerin, Lubriderm, etc; but keep the skin between your toes dry. *  If you smoke, your wound can not heal properly -- please talk with us when you're ready to quit.         F/U Appointment is with Dr. Dalton Singh 3 weeks on                                   at                       .     Your nurse  is Mariza Pena RN.      If we applied slip-resistant hospital socks today, be sure to remove them at least once a day to inspect your toes or feet, even if you're not changing the wraps or dressings underneath.  If you see anything concerning (redness, excess moisture, etc), please call and let us know right away.     Should you experience any significant changes in your wound(s) (including redness, increased warmth, increased pain, increased drainage, odor, or fever) or have questions about your wound care, please contact the [address] [place] [address] at [phone] Monday-Thursday from 8:00 am - 4:30 pm, or Friday from 8:00 am - 2:30 pm.  If you need help with your wound outside these hours and cannot wait until we are again available, contact your home-care company (if applicable), your PCP, or go to the nearest emergency room.

## 2022-04-27 NOTE — PLAN OF CARE
Heel wound reopened, will apply PSO & mepilex heel border. Pt. Wearing off-loading boots to prevent pressure on heel. Left buttock wound showing improvement in depth, debridement per MD & pt. Tolerated well. Will stop packing, cont. With Ag alginate to wound, cover with dry dressing, change dressing on daily or every other day. Pt. To use Calmoseptine or Triad mixed with antifungal powder to rash on buttocks. Pt. Is continuing to remain in bed off-loading as ordered to assist with wound healing. F/u in [address] [place] [address],[address] in 3 weeks as ordered, pt. Aware to call sooner with any changes or questions/concerns. Discharge instructions reviewed with patient & aunt (caregiver), all questions answered, copy given to patient. Dressings were applied to all wounds per M.D. Instructions at this visit.

## 2022-04-28 DIAGNOSIS — S24.102S T5 SPINAL CORD INJURY, SEQUELA (HCC): ICD-10-CM

## 2022-04-28 DIAGNOSIS — K59.2 NEUROGENIC BOWEL: ICD-10-CM

## 2022-04-28 DIAGNOSIS — G82.20 PARALYSIS OF BOTH LOWER LIMBS (HCC): ICD-10-CM

## 2022-04-28 DIAGNOSIS — N31.9 NEUROGENIC BLADDER: ICD-10-CM

## 2022-05-04 NOTE — PROGRESS NOTES
88 [facility] Progress Note    Gabbi Laura     : 1970    DATE OF VISIT:  2022    Subjective:     Gabbi Laura is a 46 y.o. male who has a pressure ulcer located on the left ischium. Significant symptoms or pertinent wound history since last visit: feeling pretty well overall, no F/C/D, eating well, doing a great job with offloading as usual. The added gauze bolsters seem to be guarding against trauma to the wound when he transfers in and out of his car, though there was a bit of an issue today, because his family were out of the larger cover dressings, so the gauze bolster slipped out from underneath the smaller one they had, and he DID have a bit of bleeding here today, though not as much as prior weeks. Additional ulcer(s) noted? yes. That heel is somehow still open as well, though very small and superficial.     His current medication list consists of Bardex Lubricath Catheter, Catheters, Depend Real-Fit Briefs for Men, Disposable Gloves, Disposable Liners, ammonium lactate, baclofen, bisacodyl, escitalopram, oxybutynin, rivaroxaban, and senna-docusate. Allergies: Patient has no known allergies.     Objective:     Vitals:    22 1409   BP: 126/71   Pulse: 80   Resp: 20   Temp: 98.8 °F (37.1 °C)   TempSrc: Oral   Weight: 219 lb 4 oz (99.5 kg)   Height: 6' 1\" (1.854 m)     Constitutional:  well-developed, well-nourished, NAD  Psychiatric:  oriented to person, place and time; mood and affect appropriate for the situation   Musculoskeletal:  no clubbing, cyanosis or petechiae; hips and pelvic area with no gross effusions, joint misalignment or acute arthritis  Skin: warm, dry, normal turgor; no rash, minor area of pressure/friction/moisture erythema at left superior buttock - probably less noticeable than a few weeks ago.     Left ischial pressure ulcer with periwound looking mostly healthy, pink, warm, no bruising, a bit more epidermal necrosis that followed the friction trauma this week; this week the wound bed itself is looking better this week, red, granular, still just a bit friable and bleeds easily, but all of that prior tunneling depth looks to be resolved, which is a big plus. Heel very small, stage 2, pink-red, scant exudate.     Today's wound measurements, per RN documentation:  Wound 03/09/22 #2 Right Posterior Heel, Pressure Stage 2, Onset 03/09/2022-Wound Length (cm): 0.2 cm  Wound 12/16/21 #1 Left Lower Buttocks, Pressure, Stage 4, Onset 10/2021-Wound Length (cm): 3.3 cm    Wound 03/09/22 #2 Right Posterior Heel, Pressure Stage 2, Onset 03/09/2022-Wound Width (cm): 0.2 cm  Wound 12/16/21 #1 Left Lower Buttocks, Pressure, Stage 4, Onset 10/2021-Wound Width (cm): 1.9 cm    Wound 03/09/22 #2 Right Posterior Heel, Pressure Stage 2, Onset 03/09/2022-Wound Depth (cm): 0.1 cm  Wound 12/16/21 #1 Left Lower Buttocks, Pressure, Stage 4, Onset 10/2021-Wound Depth (cm): 0.7 cm    Assessment:     Patient Active Problem List   Diagnosis Code    [facility] S24.102A    Neurogenic bladder N31.9    Neurogenic bowel K59.2    Depression F32. A    Paraplegia ([facility]) G82.20    Pressure ulcer of left ischium, stage 4 ([facility]) L89.324    GERD (gastroesophageal reflux disease) K21.9    Hypergranulation L92.9    Pressure ulcer of right heel, stage 2 ([facility]) L89.612       Assessment of today's active condition(s): Hx fall, T-spine trauma, paraplegia, healing stage 4 left ischial pressure ulcer, initial soft tissue infection resolved quickly, never any signs of osteo; tunnel and depth have improved; biggest challenge lately has been recurrent skin trauma and bleeding with transfers in and out of his car, but the rolled gauze bolsters seem to have helped. Also a very small heel pressure ulcer, which ought to do fine; offloaded well now.  Factors contributing to occurrence and/or persistence of the chronic ulcer include chronic pressure, decreased mobility and shear force. Medical necessity of today's visit is shown by the above documentation. Sharp debridement is indicated today, based upon the exam findings in the wound(s) above. Procedure note:     Consent obtained. Time out performed per [facility]. Anesthetic  Anesthetic: 4% Lidocaine Cream     Using a scissors, I sharply debrided the left ischium ulcer(s) down through and including the removal of epidermis. The type(s) of tissue debrided included necrotic/eschar. Total Surface Area Debrided: 2 sq cm. The ulcers were then irrigated with normal saline solution. The procedure was completed with a small amount of bleeding, and hemostasis was with pressure and with silver nitrate stick(s). The patient tolerated the procedure well, with no significant complications. The patient's level of pain during and after the procedure was monitored. Post-debridement measurements, if different from pre-debridement, are in the flowsheet as well. Discharge plan:     Treatment in the wound care center today, per RN documentation: Wound 03/09/22 #2 Right Posterior Heel, Pressure Stage 2, Onset 03/09/2022-Dressing/Treatment: Other (comment) (PSO,mepilex heel border)  Wound 12/16/21 #1 Left Lower Buttocks, Pressure, Stage 4, Onset 10/2021-Dressing/Treatment: Other (comment) (triad-renetta,opticel ag,gauze bolster,mepilex border). Leave Heel Border in place for as long as it remains clean and adhered. Keep up the great work with offloading and protein intake. We gave them a couple of extra larger border dressings today, to hold them over until they get a new shipment in from their [employer] company. Home treatment: good handwashing before and after any dressing changes. Cleanse wound with saline or soap & water before dressing change. May use Vaseline (petrolatum), Aquaphor, Aveeno, CeraVe, Cetaphil, Eucerin, Lubriderm, etc for dry skin. Dressing type for home: as above for the ischium, three times weekly.  Written discharge instructions given to patient. Follow up in 3 weeks.     Electronically signed by Lázaro Macdonald MD on 5/4/2022 at 1:25 PM.

## 2022-05-18 ENCOUNTER — HOSPITAL ENCOUNTER (OUTPATIENT)
Dept: WOUND CARE | Age: 52
Discharge: HOME | End: 2022-05-18
Payer: COMMERCIAL

## 2022-05-18 VITALS
HEIGHT: 73 IN | BODY MASS INDEX: 28.65 KG/M2 | SYSTOLIC BLOOD PRESSURE: 111 MMHG | DIASTOLIC BLOOD PRESSURE: 77 MMHG | WEIGHT: 216.2 LBS | RESPIRATION RATE: 16 BRPM | HEART RATE: 102 BPM | TEMPERATURE: 98.2 F

## 2022-05-18 DIAGNOSIS — L89.612 PRESSURE ULCER OF RIGHT HEEL, STAGE 2 (HCC): Primary | ICD-10-CM

## 2022-05-18 DIAGNOSIS — L89.324 PRESSURE INJURY OF LEFT ISCHIUM, STAGE 4 (HCC): ICD-10-CM

## 2022-05-18 DIAGNOSIS — L92.9 HYPERGRANULATION: ICD-10-CM

## 2022-05-18 PROCEDURE — 99213 OFFICE O/P EST LOW 20 MIN: CPT

## 2022-05-18 PROCEDURE — 99213 OFFICE O/P EST LOW 20 MIN: CPT | Performed by: INTERNAL MEDICINE

## 2022-05-18 RX ORDER — BACITRACIN ZINC AND POLYMYXIN B SULFATE 500; 1000 [USP'U]/G; [USP'U]/G
OINTMENT TOPICAL ONCE
Status: CANCELLED | OUTPATIENT
Start: 2022-05-18 | End: 2022-05-18

## 2022-05-18 RX ORDER — LIDOCAINE 50 MG/G
OINTMENT TOPICAL ONCE
Status: DISCONTINUED | OUTPATIENT
Start: 2022-05-18 | End: 2022-05-19 | Stop reason: HOSPADM

## 2022-05-18 RX ORDER — LIDOCAINE 40 MG/G
CREAM TOPICAL ONCE
Status: CANCELLED | OUTPATIENT
Start: 2022-05-18 | End: 2022-05-18

## 2022-05-18 RX ORDER — LIDOCAINE HYDROCHLORIDE 40 MG/ML
SOLUTION TOPICAL ONCE
Status: CANCELLED | OUTPATIENT
Start: 2022-05-18 | End: 2022-05-18

## 2022-05-18 RX ORDER — LIDOCAINE 50 MG/G
OINTMENT TOPICAL ONCE
Status: CANCELLED | OUTPATIENT
Start: 2022-05-18 | End: 2022-05-18

## 2022-05-18 RX ORDER — BACITRACIN ZINC AND POLYMYXIN B SULFATE 500; 1000 [USP'U]/G; [USP'U]/G
OINTMENT TOPICAL ONCE
Status: DISCONTINUED | OUTPATIENT
Start: 2022-05-18 | End: 2022-05-19 | Stop reason: HOSPADM

## 2022-05-18 RX ORDER — LIDOCAINE HYDROCHLORIDE 40 MG/ML
SOLUTION TOPICAL ONCE
Status: DISCONTINUED | OUTPATIENT
Start: 2022-05-18 | End: 2022-05-19 | Stop reason: HOSPADM

## 2022-05-18 RX ORDER — LIDOCAINE 40 MG/G
CREAM TOPICAL ONCE
Status: DISCONTINUED | OUTPATIENT
Start: 2022-05-18 | End: 2022-05-19 | Stop reason: HOSPADM

## 2022-05-18 ASSESSMENT — PAIN SCALES - GENERAL: PAINLEVEL_OUTOF10: 0

## 2022-05-18 NOTE — PLAN OF CARE
this week (labs, imaging, medications, etc.):      Apply rolled gauze bolster over wound on the day of your next appt. To possibly help protect from friction/shear when transferring. Additional instructions for specific diagnoses:     General comments for pressure ulcers: *  Make sure you stay well-hydrated, and maintain good protein intake. *  Reposition at least every two hours, to keep from having pressure in one spot for too long. *  If you are not sure whether you have the best offloading surfaces (mattress, mattress overlay, chair cushion, heel-protector boots, etc), please ask. *  Moisturize your skin regularly with Vaseline, Aquaphor, Aveeno, CeraVe, Cetaphil, Eucerin, Lubriderm, etc; but keep the skin between your toes dry. *  If you smoke, your wound can not heal properly -- please talk with us when you're ready to quit. F/U Appointment is with Dr. Didier Hicks in 4 weeks on                                   at                       .     Your nurse  is Brigitte Mayer or KATIE Pena. If we applied slip-resistant hospital socks today, be sure to remove them at least once a day to inspect your toes or feet, even if you're not changing the wraps or dressings underneath. If you see anything concerning (redness, excess moisture, etc), please call and let us know right away. Should you experience any significant changes in your wound(s) (including redness, increased warmth, increased pain, increased drainage, odor, or fever) or have questions about your wound care, please contact the [address] [place] [address] at [phone] Monday-Thursday from 8:00 am - 4:30 pm, or Friday from 8:00 am - 2:30 pm.  If you need help with your wound outside these hours and cannot wait until we are again available, contact your home-care company (if applicable), your PCP, or go to the nearest emergency room.

## 2022-05-25 NOTE — PROGRESS NOTES
88 [facility] Progress Note    Deion Charles     : 1970    DATE OF VISIT:  2022    Subjective:     Deion Charles is a 46 y.o. male who has a pressure ulcer located on the left ischium and foot (right, heel). Current complaint of pain in this ulcer? no.    Other significant symptoms or pertinent ulcer history: seems to have been doing pretty well with that ischial pressure ulcer in the last few weeks, very little bleeding at home, still a bit of bleeding and skin trauma with the transfer in and out of the car to get here today, but I do think it looks like he's taking two steps forward and then an occasional one step back, so still some slow progress overall. No F/C/D, eating well, offloading well. Heel is just barely open, doing fine. Additional ulcer(s) noted? no.      Mr. Linwood Jay has a past medical history of Cellulitis and abscess of buttock, Clavicular fracture, closed, shaft, Closed fracture of multiple ribs, Closed fracture of scapula, DVT of leg (deep venous thrombosis) ([address] Hemothorax, traumatic, Hypertension, Injury, crush, back, Pneumothorax, closed, traumatic, Pulmonary embolism without acute cor pulmonale ([address] SAH (subarachnoid hemorrhage) ([address] Spinal cord injury, thoracic (T1-T6) ([address] Stage II pressure ulcer of left buttock ([address] and T6 vertebral fracture ([address] He has a past surgical history that includes back surgery and fracture surgery. His family history includes Coronary Art Dis in his maternal grandfather; Dementia in his maternal grandmother; No Known Problems in his father, mother, paternal grandfather, and sister; Tuberculosis in his paternal grandmother. Mr. Linwood Jay reports that he has never smoked. He has never used smokeless tobacco. He reports that he does not drink alcohol and does not use drugs.     His current medication list consists of Bardex Lubricath Catheter, Catheters, Depend Real-Fit Briefs for Men, Disposable Gloves, Disposable Liners, ammonium lactate, baclofen, bisacodyl, escitalopram, oxybutynin, rivaroxaban, and senna-docusate. Allergies: Patient has no known allergies. Pertinent items from the review of systems are discussed in the HPI; the remainder of the ROS was reviewed and is negative. Objective:     Vitals:    05/18/22 1302   BP: 111/77   Pulse: 102   Resp: 16   Temp: 98.2 °F (36.8 °C)   TempSrc: Oral   Weight: 216 lb 3.2 oz (98.1 kg)   Height: 6' 1\" (1.854 m)       Constitutional:  well-developed, well-nourished, NAD   Psychiatric:  oriented to person, place and time; mood and affect appropriate for the situation   Musculoskeletal:  no clubbing, cyanosis or petechiae; hips and pelvic area with no gross effusions, joint misalignment or acute arthritis  Skin: warm, dry, normal turgor; no rash, minor area of pressure/friction/moisture erythema at left superior buttock - less noticeable than a few weeks ago.     Left ischial pressure ulcer with periwound looking mostly healthy, pink, warm, no bruising, a bit more epidermal peeling that followed the friction trauma this week; this week the wound bed itself is looking better again, red, granular, still just a bit friable and bleeds easily, but all of that prior tunneling and depth look to be resolved, which is a big plus.  Heel very small, borderline healed, stage 2, pink-red, scant exudate.     Today's Wound Measurements, per RN documentation:  Wound 03/09/22 #2 Right Posterior Heel, Pressure Stage 2, Onset 03/09/2022-Wound Length (cm): 0.1 cm  Wound 12/16/21 #1 Left Lower Buttocks, Pressure, Stage 4, Onset 10/2021-Wound Length (cm): 2.2 cm    Wound 03/09/22 #2 Right Posterior Heel, Pressure Stage 2, Onset 03/09/2022-Wound Width (cm): 0.1 cm  Wound 12/16/21 #1 Left Lower Buttocks, Pressure, Stage 4, Onset 10/2021-Wound Width (cm): 3 cm    Wound 03/09/22 #2 Right Posterior Heel, Pressure Stage 2, Onset 03/09/2022-Wound Depth (cm): 0.1 cm  Wound 12/16/21 #1 Left Lower Buttocks, Pressure, Stage 4, Onset 10/2021-Wound Depth (cm): 0.3 cm  ______________________________    Lab Results   Component Value Date    LABALBU 3.2 (L) 12/07/2021     Lab Results   Component Value Date    CREATININE 0.9 12/07/2021     Lab Results   Component Value Date    HGB 13.6 12/07/2021     Assessment:     Patient Active Problem List   Diagnosis Code    [facility]5 spinal [facility] S24.102A    Neurogenic bladder N31.9    Neurogenic bowel K59.2    Depression F32. A    Paraplegia ([facility]) G82.20    Pressure ulcer of left ischium, stage 4 ([facility]) L89.324    GERD (gastroesophageal reflux disease) K21.9    Hypergranulation L92.9    Pressure ulcer of right heel, stage 2 ([facility]) L89.612       Assessment of today's active condition(s): Hx spinal cord injury, paraplegia, slowly healing stage 4 ischial pressure ulcer; prior soft tissue infection, no osteomyelitis; main obstacle to healing at this point is just brief moments of shear and friction when he transfers in and out of his car; also a very small heel pressure ulcer, which I'm actually surprised isn't healed yet, but seems right on the cusp. Factors contributing to occurrence and/or persistence of the chronic ulcer include chronic pressure, decreased mobility and shear force. Medical necessity of today's visit is shown by the above documentation. Sharp debridement is not indicated today, based upon the exam findings in the wound(s) above. Discharge plan:     Treatment in the wound care center today, per RN documentation: Wound 03/09/22 #2 Right Posterior Heel, Pressure Stage 2, Onset 03/09/2022-Dressing/Treatment: Other (comment) (xeroform mepilex heel)  Wound 12/16/21 #1 Left Lower Buttocks, Pressure, Stage 4, Onset 10/2021-Dressing/Treatment: Other (comment) (triad opticel ag rolled gauze mepilex border). Keep the heel dressing in place as long as it holds up. Keep up with protein intake, good offloading techniques.  Keep motor vehicle transfers to a reasonable minimum, which he already is. Home treatment: good handwashing before and after any dressing changes. Cleanse ulcer with saline or soap & water before dressing change. May use Vaseline (petrolatum), Aquaphor, Aveeno, CeraVe, Cetaphil, Eucerin, Lubriderm, etc for dry skin. Dressing type for home: as above for the ischium, TIW, or honestly as infrequently as they can manage. Written discharge instructions given to patient. Follow up in 4 weeks (longer than I'd usually go, but I worry that seeing him more frequency just subjects that ischial wound to more frequent injury, with car transfers).     Electronically signed by Shaggy Farrell MD on 5/25/2022 at 2:58 PM.

## 2022-06-08 DIAGNOSIS — S24.101A SPINAL CORD INJURY, T1-T6 (HCC): ICD-10-CM

## 2022-06-08 RX ORDER — BISACODYL 10 MG
SUPPOSITORY, RECTAL RECTAL
Qty: 30 SUPPOSITORY | Refills: 0 | Status: SHIPPED | OUTPATIENT
Start: 2022-06-08 | End: 2022-08-12

## 2022-06-14 DIAGNOSIS — F32.A DEPRESSION, UNSPECIFIED DEPRESSION TYPE: Primary | ICD-10-CM

## 2022-06-14 RX ORDER — DIAZEPAM 2 MG/1
TABLET ORAL
Qty: 30 TABLET | Refills: 0 | Status: SHIPPED | OUTPATIENT
Start: 2022-06-14 | End: 2022-07-12

## 2022-06-14 NOTE — TELEPHONE ENCOUNTER
Date of last refill of this med was 3/23/2022, # of pills given 30 and # of refills given 2. Their next appointment is 0, the last date patient was seen was 11/30/2021. Does patient have medication agreement on file? No  Has drug screen been done in last 12 months if needed?  no

## 2022-06-15 ENCOUNTER — HOSPITAL ENCOUNTER (OUTPATIENT)
Dept: WOUND CARE | Age: 52
Discharge: HOME | End: 2022-06-15
Payer: COMMERCIAL

## 2022-06-15 VITALS
RESPIRATION RATE: 18 BRPM | DIASTOLIC BLOOD PRESSURE: 89 MMHG | HEIGHT: 73 IN | BODY MASS INDEX: 28.52 KG/M2 | HEART RATE: 77 BPM | SYSTOLIC BLOOD PRESSURE: 155 MMHG | TEMPERATURE: 98.8 F

## 2022-06-15 DIAGNOSIS — L89.612 PRESSURE ULCER OF RIGHT HEEL, STAGE 2 (HCC): Primary | ICD-10-CM

## 2022-06-15 DIAGNOSIS — L89.324 PRESSURE INJURY OF LEFT ISCHIUM, STAGE 4 (HCC): ICD-10-CM

## 2022-06-15 DIAGNOSIS — L92.9 HYPERGRANULATION: ICD-10-CM

## 2022-06-15 PROCEDURE — 99213 OFFICE O/P EST LOW 20 MIN: CPT

## 2022-06-15 PROCEDURE — 99213 OFFICE O/P EST LOW 20 MIN: CPT | Performed by: INTERNAL MEDICINE

## 2022-06-15 RX ORDER — LIDOCAINE 40 MG/G
CREAM TOPICAL ONCE
Status: DISCONTINUED | OUTPATIENT
Start: 2022-06-15 | End: 2022-06-16 | Stop reason: HOSPADM

## 2022-06-15 RX ORDER — LIDOCAINE 50 MG/G
OINTMENT TOPICAL ONCE
OUTPATIENT
Start: 2022-06-15 | End: 2022-06-15

## 2022-06-15 RX ORDER — LIDOCAINE HYDROCHLORIDE 40 MG/ML
SOLUTION TOPICAL ONCE
Status: DISCONTINUED | OUTPATIENT
Start: 2022-06-15 | End: 2022-06-16 | Stop reason: HOSPADM

## 2022-06-15 RX ORDER — LIDOCAINE 40 MG/G
CREAM TOPICAL ONCE
OUTPATIENT
Start: 2022-06-15 | End: 2022-06-15

## 2022-06-15 RX ORDER — BACITRACIN ZINC AND POLYMYXIN B SULFATE 500; 1000 [USP'U]/G; [USP'U]/G
OINTMENT TOPICAL ONCE
OUTPATIENT
Start: 2022-06-15 | End: 2022-06-15

## 2022-06-15 RX ORDER — BACITRACIN ZINC AND POLYMYXIN B SULFATE 500; 1000 [USP'U]/G; [USP'U]/G
OINTMENT TOPICAL ONCE
Status: DISCONTINUED | OUTPATIENT
Start: 2022-06-15 | End: 2022-06-16 | Stop reason: HOSPADM

## 2022-06-15 RX ORDER — LIDOCAINE 50 MG/G
OINTMENT TOPICAL ONCE
Status: DISCONTINUED | OUTPATIENT
Start: 2022-06-15 | End: 2022-06-16 | Stop reason: HOSPADM

## 2022-06-15 RX ORDER — LIDOCAINE HYDROCHLORIDE 40 MG/ML
SOLUTION TOPICAL ONCE
OUTPATIENT
Start: 2022-06-15 | End: 2022-06-15

## 2022-06-15 ASSESSMENT — PAIN SCALES - GENERAL: PAINLEVEL_OUTOF10: 0

## 2022-06-15 NOTE — PLAN OF CARE
Patient & aunt (caregiver) states the buttock wound was almost healed prior to his visit today. New trauma & active bleeding noted today from buttock wound that occurred as a result of his transfers to come to his appt. today. Surgicel applied to control bleeding. Will cont. With current wound care regime with dressings. Posterior heel wound healed, although new plantar foot wound noted. Will start daily or every other day dressing changes to heel. No further f/u in [address] [place] [address],[address] at this time d/t patients buttock wound incurring new trauma with transfers to come to his appts. Will follow up via phone or email with Shanae Jackson &/or patient & aunt. Discharge instructions reviewed with patient & aunt, all questions answered, copy given to patient. Dressings were applied to all wounds per M.D. Instructions at this visit.

## 2022-06-16 NOTE — PLAN OF CARE
[facility] Physician Orders and Discharge [address]  [address], [address]  [place], [facility]  Telephone: [phone]      Fax: [phone]        Your home care company:   [employer] . Your wound-care supplies will be provided by:  Home Care . NAME:  Nanda Stovall   YOB: 1970  PRIMARY DIAGNOSIS FOR WOUND CARE CENTER:  Pressure Wound- Stage 4 . Wound cleansing:   Do not scrub or use excessive force. Wash hands with soap and water before and after dressing changes. Prior to applying a clean dressing, cleanse wound with normal saline, wound cleanser, or mild soap and water. Ask your physician or nurse before getting the wound(s) wet in the shower. Wound care for home:     Right Plantar Heel:  Antibiotic ointment to wound  Cover with dry dressing  Change once daily or every other day      Wear the off-loading boots to help prevent pressure on heel. Apply Calmoseptine or Triad mixed with antifungal powder to rash on buttocks  Reapply daily or as needed. Left Ischial Wound:   Surgicel applied to wound to help control wound healing. This will come out on its own with the next couple of dressing changes. Triad to renetta-wound (not into wound)  [place] over wound    Rolled gauze (approx. 80% of 2\" danica roll) DO THIS TODAY IN [facility] & THEN ON DAY OF NEXT WOUND CARE APPT.  Or WHEN TRANSFERRING  Cover with larger mepilex border or bordered gauze  Change Daily or every other day depending on drainage        Please note, all wounds (unless stated otherwise here) were mechanically debrided at the time of cleansing here in the wound-care center today, so a small amount of pain, drainage or bleeding from that process might be expected, and is normal.      All products for home use, including multiple products for a single wound if applicable, are medically necessary in order to achieve the best chance at timely wound healing. See provider documentation for details if needed. Substituted dressings applied in the [facility] today, if applicable:     N/a     New orders for this week (labs, imaging, medications, etc.):      [facility] AT [facility] nurse to email wound photos for an update every couple of weeks to [email or web address]  May call wound care center or email Dr Miguel Angel Mendez for any changes or questions/concerns & updated wound care orders. Apply rolled gauze bolster over wound on the day of your next appt. To possibly help protect from friction/shear when transferring. Additional instructions for specific diagnoses:     General comments for pressure ulcers: *  Make sure you stay well-hydrated, and maintain good protein intake. *  Reposition at least every two hours, to keep from having pressure in one spot for too long. *  If you are not sure whether you have the best offloading surfaces (mattress, mattress overlay, chair cushion, heel-protector boots, etc), please ask. *  Moisturize your skin regularly with Vaseline, Aquaphor, Aveeno, CeraVe, Cetaphil, Eucerin, Lubriderm, etc; but keep the skin between your toes dry. *  If you smoke, your wound can not heal properly -- please talk with us when you're ready to quit. No Further f/u in [facility] at this time. Will plan to keep in touch via email or phone until healed through [facility] nurse. Your nurse  is Royal Collazo or KATIE Pena. If we applied slip-resistant hospital socks today, be sure to remove them at least once a day to inspect your toes or feet, even if you're not changing the wraps or dressings underneath. If you see anything concerning (redness, excess moisture, etc), please call and let us know right away.      Should you experience any significant changes in your wound(s) (including redness, increased warmth, increased pain, increased drainage, odor, or fever) or have questions about your wound care, please contact the [facility] Jeff Cates at [phone] Monday-Thursday from 8:00 am - 4:30 pm, or Friday from 8:00 am - 2:30 pm.  If you need help with your wound outside these hours and cannot wait until we are again available, contact your home-care company (if applicable), your PCP, or go to the nearest emergency room.

## 2022-06-23 DIAGNOSIS — S24.101A SPINAL CORD INJURY, T1-T6 (HCC): ICD-10-CM

## 2022-06-23 RX ORDER — OXYBUTYNIN CHLORIDE 5 MG/1
TABLET ORAL
Qty: 270 TABLET | Refills: 3 | Status: SHIPPED | OUTPATIENT
Start: 2022-06-23

## 2022-06-26 NOTE — PROGRESS NOTES
[facility] Progress Note    Santiago Wisdom     : 1970    DATE OF VISIT:  6/15/2022    Subjective:     Santiago Wisdom is a 46 y.o. male who has a pressure ulcer located on the left ischium. Current complaint of pain in this ulcer? no.    Other significant symptoms or pertinent ulcer history: it sounds like things were going well the last couple of weeks, with less drainage and bleeding, more epidermal coverage, but then today, on transferring in and out of his car, some of that new skin tore free from the wound bed again, and we're back to where we were for his last two visits. Additional ulcer(s) noted? yes. The posterior heel pressure ulcer is healed, but there's a small plantar heel ulcer now for some reason. Dressing-related skin tear? Mr. Renetta Bui has a past medical history of Cellulitis and abscess of buttock, Clavicular fracture, closed, shaft, Closed fracture of multiple ribs, Closed fracture of scapula, DVT of leg (deep venous thrombosis) ([address] Hemothorax, traumatic, Hypertension, Injury, crush, back, Pneumothorax, closed, traumatic, Pulmonary embolism without acute cor pulmonale ([address] SAH (subarachnoid hemorrhage) ([address] Spinal cord injury, thoracic (T1-T6) ([address] Stage II pressure ulcer of left buttock ([address] and T6 vertebral fracture ([address] He has a past surgical history that includes back surgery and fracture surgery. His family history includes Coronary Art Dis in his maternal grandfather; Dementia in his maternal grandmother; No Known Problems in his father, mother, paternal grandfather, and sister; Tuberculosis in his paternal grandmother. Mr. Renetta Bui reports that he has never smoked. He has never used smokeless tobacco. He reports that he does not drink alcohol and does not use drugs.     His current medication list consists of Bardex Lubricath Catheter, Catheters, Depend Real-Fit Briefs for Men, Disposable Gloves, Disposable Liners, ammonium lactate, baclofen, bisacodyl, diazePAM, escitalopram, oxybutynin, rivaroxaban, and senna-docusate. Allergies: Patient has no known allergies. Pertinent items from the review of systems are discussed in the HPI; the remainder of the ROS was reviewed and is negative. Objective:     Vitals:    06/15/22 1521 06/15/22 1530   BP:  (!) 155/89   Pulse:  77   Resp: 18    Temp: 98.8 °F (37.1 °C)    TempSrc: Oral    Weight: Comment: no weight obtained today    Height: 6' 1\" (1.854 m)        Constitutional:  well-developed, well-nourished, NAD   Psychiatric:  oriented to person, place and time; mood and affect appropriate for the situation (a bit depressed today, with some news that his mom and dad are both struggling with some health issues right now too)  Musculoskeletal:  no clubbing, cyanosis or petechiae; hips and pelvic area with no gross effusions, joint misalignment or acute arthritis  Skin: warm, dry, normal turgor; no rash, minor area of pressure/friction/moisture erythema at left superior buttock - less noticeable than a few weeks ago.     Left ischial pressure ulcer with periwound looking mostly healthy, pink, warm, no bruising, recurrent epidermal peeling that followed the friction trauma this week; the wound bed itself is looking better again, red, granular, still friable and bleeds easily, but all of that prior tunneling and depth look to be resolved, which is a big plus.  Heel pressure ulcer healed, small plantar clean pink-red skin tear I think.     Today's Wound Measurements, per RN documentation:  [REMOVED] Wound 12/16/21 #1 Left Lower Buttocks, Pressure, Stage 4, Onset 10/2021-Wound Length (cm): 3 cm  [REMOVED] Wound 03/09/22 #2 Right Posterior Heel, Pressure Stage 2, Onset 03/09/2022-Wound Length (cm): 1.9 cm    [REMOVED] Wound 12/16/21 #1 Left Lower Buttocks, Pressure, Stage 4, Onset 10/2021-Wound Width (cm): 1.5 cm  [REMOVED] Wound 03/09/22 #2 Right Posterior Heel, Pressure Stage 2, Onset 03/09/2022-Wound Width (cm): 1 cm    [REMOVED] Wound 12/16/21 #1 Left Lower Buttocks, Pressure, Stage 4, Onset 10/2021-Wound Depth (cm): 0.3 cm  [REMOVED] Wound 03/09/22 #2 Right Posterior Heel, Pressure Stage 2, Onset 03/09/2022-Wound Depth (cm): 0.1 cm  ______________________________    Lab Results   Component Value Date    LABALBU 3.2 (L) 12/07/2021     Lab Results   Component Value Date    CREATININE 0.9 12/07/2021     Lab Results   Component Value Date    HGB 13.6 12/07/2021     Assessment:     Patient Active Problem List   Diagnosis Code    T5 spinal cord injury ([facility] S24.102A    Neurogenic bladder N31.9    Neurogenic bowel K59.2    Depression F32. A    Paraplegia (HCC) G82.20    Pressure ulcer of left ischium, stage 4 ([facility]) L89.324    GERD (gastroesophageal reflux disease) K21.9    Hypergranulation L92.9    Pressure ulcer of right heel, stage 2 ([facility]) L89.612       Assessment of today's active condition(s): Hx spinal cord injury, paraplegia, stalled stage 4 left ischial pressure ulcer, no signs of infection, well offloaded at home, just continues to reinjure that area with transfers in and out of his car, so I think the best plan for now would be to transition him to a plan of wound care at home only, following up with us via phone, email, photos. Also a small heel ulcer that should do fine with some brief local care. Factors contributing to occurrence and/or persistence of the chronic ulcer include chronic pressure, decreased mobility and shear force. Medical necessity of today's visit is shown by the above documentation. Sharp debridement is not indicated today, based upon the exam findings in the wound(s) above.      Discharge plan:     Treatment in the wound care center today, per RN documentation: [REMOVED] Wound 12/16/21 #1 Left Lower Buttocks, Pressure, Stage 4, Onset 10/2021-Dressing/Treatment: Other (comment) (opticel ag mepilex border)  [REMOVED] Wound 03/09/22 #2 Right Posterior Heel, Pressure Stage 2, Onset 03/09/2022-Dressing/Treatment: Other (comment) (PSO 4x4's danica). Continue excellent work in offloading at home, protein intake. Home treatment: good handwashing before and after any dressing changes. Cleanse ulcer with saline or soap & water before dressing change. May use Vaseline (petrolatum), Aquaphor, Aveeno, CeraVe, Cetaphil, Eucerin, Lubriderm, etc for dry skin. Dressing type for home: as above for the heel, every day or two; Vashe, ZnO, silver alginate, rolled gauze bolster before necessary transfers in and out of the car, cover dressing, every other day. Written discharge instructions given to patient. Follow up by phone and with a photo from home-care, in perhaps 2-3 weeks, but he can certainly call any time with concerns or questions.     Electronically signed by Lázaro Macdonald MD on 6/26/2022 at 7:17 PM.

## 2022-07-05 DIAGNOSIS — Z86.718 PERSONAL HISTORY OF DVT (DEEP VEIN THROMBOSIS): ICD-10-CM

## 2022-07-05 DIAGNOSIS — S24.101A SPINAL CORD INJURY, T1-T6 (HCC): ICD-10-CM

## 2022-07-05 RX ORDER — BACLOFEN 10 MG/1
TABLET ORAL
Qty: 180 TABLET | Refills: 0 | Status: SHIPPED | OUTPATIENT
Start: 2022-07-05 | End: 2022-07-18 | Stop reason: DRUGHIGH

## 2022-07-07 DIAGNOSIS — F32.A DEPRESSION, UNSPECIFIED DEPRESSION TYPE: ICD-10-CM

## 2022-07-07 NOTE — TELEPHONE ENCOUNTER
Date of last refill of this med was 6/14/2022, # of pills given 30 and # of refills given 0. Their next appointment is 0, the last date patient was seen was 11/30/2021. Does patient have medication agreement on file? No  Has drug screen been done in last 12 months if needed?  no

## 2022-07-12 RX ORDER — DIAZEPAM 2 MG/1
TABLET ORAL
Qty: 30 TABLET | Refills: 0 | Status: SHIPPED | OUTPATIENT
Start: 2022-07-12 | End: 2022-08-08

## 2022-07-18 ENCOUNTER — TELEPHONE (OUTPATIENT)
Dept: WOUND CARE | Age: 52
End: 2022-07-18

## 2022-07-18 DIAGNOSIS — S24.101A SPINAL CORD INJURY, T1-T6 (HCC): ICD-10-CM

## 2022-07-18 RX ORDER — BACLOFEN 20 MG/1
TABLET ORAL
Qty: 90 TABLET | Refills: 0 | Status: SHIPPED | OUTPATIENT
Start: 2022-07-18 | End: 2022-10-11

## 2022-07-18 NOTE — TELEPHONE ENCOUNTER
Received an email from Mr. [address] 66 home care nurse on Friday with a couple of updated photos. The ischial ulcer looks well-healed, and the heel is either healed, or very close. I don't think he necessarily needs ongoing dressings to the ischium, just efforts to keep the skin dry, and very close attention to avoiding pressure and friction and shear, especially for the next couple of months. For the heel, which does look a bit fragile, another week or two of dressings would be reasonable, and certainly wouldn't hurt. No additional in-person follow-up being planned right now, but I'd be happy to see him any time if needed.             Electronically signed by Carissa Paz MD on 7/18/2022 at 10:16 AM

## 2022-08-02 DIAGNOSIS — G82.20 PARALYSIS OF BOTH LOWER LIMBS (HCC): ICD-10-CM

## 2022-08-02 DIAGNOSIS — N31.9 NEUROGENIC BLADDER: ICD-10-CM

## 2022-08-02 DIAGNOSIS — K59.2 NEUROGENIC BOWEL: ICD-10-CM

## 2022-08-02 DIAGNOSIS — S24.102S T5 SPINAL CORD INJURY, SEQUELA (HCC): ICD-10-CM

## 2022-08-02 RX ORDER — MEDICAL SUPPLY, MISCELLANEOUS
EACH MISCELLANEOUS
Qty: 100 EACH | Refills: 5 | Status: SHIPPED | OUTPATIENT
Start: 2022-08-02

## 2022-08-08 DIAGNOSIS — F32.A DEPRESSION, UNSPECIFIED DEPRESSION TYPE: ICD-10-CM

## 2022-08-08 RX ORDER — DIAZEPAM 2 MG/1
TABLET ORAL
Qty: 30 TABLET | Refills: 0 | Status: SHIPPED | OUTPATIENT
Start: 2022-08-08 | End: 2022-09-07

## 2022-08-08 NOTE — TELEPHONE ENCOUNTER
Date of last refill of this med was 7/12/2022, # of pills given 30 and # of refills given 0. Their next appointment is 0, the last date patient was seen was 11/30/2021. Does patient have medication agreement on file? No  Has drug screen been done in last 12 months if needed?  no

## 2022-08-12 DIAGNOSIS — S24.101A SPINAL CORD INJURY, T1-T6 (HCC): ICD-10-CM

## 2022-08-12 RX ORDER — BISACODYL 10 MG
SUPPOSITORY, RECTAL RECTAL
Qty: 30 SUPPOSITORY | Refills: 5 | Status: SHIPPED | OUTPATIENT
Start: 2022-08-12

## 2022-08-16 ENCOUNTER — TELEPHONE (OUTPATIENT)
Dept: FAMILY MEDICINE CLINIC | Age: 52
End: 2022-08-16

## 2022-08-16 NOTE — TELEPHONE ENCOUNTER
Pt called and said that his wheelchair is broke. Needs a script would like one with bigger wheels to get through grass, and gravel. Does he need a face to face appt. He also was wanting a roho cushion for his scooter.

## 2022-09-12 DIAGNOSIS — G82.20 PARAPLEGIA (HCC): Primary | ICD-10-CM

## 2022-09-12 RX ORDER — DIAZEPAM 2 MG/1
TABLET ORAL
Qty: 30 TABLET | Refills: 1 | Status: SHIPPED | OUTPATIENT
Start: 2022-09-12 | End: 2022-10-12

## 2022-09-26 RX ORDER — BACLOFEN 10 MG/1
TABLET ORAL
Qty: 180 TABLET | Refills: 0 | Status: SHIPPED | OUTPATIENT
Start: 2022-09-26

## 2022-10-11 DIAGNOSIS — S24.101A SPINAL CORD INJURY, T1-T6 (HCC): ICD-10-CM

## 2022-10-11 RX ORDER — BACLOFEN 20 MG/1
TABLET ORAL
Qty: 90 TABLET | Refills: 0 | Status: SHIPPED | OUTPATIENT
Start: 2022-10-11

## 2022-10-11 NOTE — LETTER
[address]  [address] [facility]  Phone: [phone]  Fax: [phone]    Jovany Paredes MD        October 11, 2022    Isaias Aman  [address] [place] [id number]      Dear Elieser Cancer: We recently received a refill request for your medications and upon review you are due for a routine check up. Please contact our office to schedule. If you have any questions or concerns, please don't hesitate to call.       Sincerely,        ANTONIO Garza

## 2022-10-24 RX ORDER — DIAZEPAM 2 MG/1
TABLET ORAL
Qty: 30 TABLET | Refills: 0 | OUTPATIENT
Start: 2022-10-24

## 2022-10-24 NOTE — TELEPHONE ENCOUNTER
Date of last refill of this med was 9/12/2022, # of pills given 30 and # of refills given 1. Their next appointment is 0, the last date patient was seen was 11/30/2021. Does patient have medication agreement on file? No  Has drug screen been done in last 12 months if needed?  no

## 2022-11-01 RX ORDER — DIAZEPAM 2 MG/1
TABLET ORAL
Qty: 30 TABLET | Refills: 0 | OUTPATIENT
Start: 2022-11-01

## 2022-12-09 RX ORDER — BACLOFEN 10 MG/1
TABLET ORAL
Qty: 180 TABLET | Refills: 0 | Status: SHIPPED | OUTPATIENT
Start: 2022-12-09

## 2022-12-16 DIAGNOSIS — G82.20 PARAPLEGIA (HCC): Primary | ICD-10-CM

## 2022-12-16 RX ORDER — DIAZEPAM 2 MG/1
TABLET ORAL
Qty: 30 TABLET | Refills: 0 | Status: SHIPPED | OUTPATIENT
Start: 2022-12-16 | End: 2023-01-15

## 2022-12-16 NOTE — TELEPHONE ENCOUNTER
Date of last refill of this med was 9/12/2022, # of pills given 30 and # of refills given 1. Their next appointment is 1/5/2023, the last date patient was seen was 11/30/2021. Does patient have medication agreement on file? No  Has drug screen been done in last 12 months if needed?  no

## 2022-12-20 DIAGNOSIS — Z86.718 PERSONAL HISTORY OF DVT (DEEP VEIN THROMBOSIS): ICD-10-CM

## 2022-12-20 NOTE — TELEPHONE ENCOUNTER
Benzoyl Peroxide Pregnancy And Lactation Text: This medication is Pregnancy Category C. It is unknown if benzoyl peroxide is excreted in breast milk. Last ov 11/30/21 Spironolactone Counseling: Patient advised regarding risks of diarrhea, abdominal pain, hyperkalemia, birth defects (for female patients), liver toxicity and renal toxicity. The patient may need blood work to monitor liver and kidney function and potassium levels while on therapy. The patient verbalized understanding of the proper use and possible adverse effects of spironolactone.  All of the patient's questions and concerns were addressed. Tazorac Pregnancy And Lactation Text: This medication is not safe during pregnancy. It is unknown if this medication is excreted in breast milk. Erythromycin Pregnancy And Lactation Text: This medication is Pregnancy Category B and is considered safe during pregnancy. It is also excreted in breast milk. Topical Clindamycin Pregnancy And Lactation Text: This medication is Pregnancy Category B and is considered safe during pregnancy. It is unknown if it is excreted in breast milk. Use Enhanced Medication Counseling?: No Dapsone Counseling: I discussed with the patient the risks of dapsone including but not limited to hemolytic anemia, agranulocytosis, rashes, methemoglobinemia, kidney failure, peripheral neuropathy, headaches, GI upset, and liver toxicity.  Patients who start dapsone require monitoring including baseline LFTs and weekly CBCs for the first month, then every month thereafter.  The patient verbalized understanding of the proper use and possible adverse effects of dapsone.  All of the patient's questions and concerns were addressed. Doxycycline Counseling:  Patient counseled regarding possible photosensitivity and increased risk for sunburn.  Patient instructed to avoid sunlight, if possible.  When exposed to sunlight, patients should wear protective clothing, sunglasses, and sunscreen.  The patient was instructed to call the office immediately if the following severe adverse effects occur:  hearing changes, easy bruising/bleeding, severe headache, or vision changes.  The patient verbalized understanding of the proper use and possible adverse effects of doxycycline.  All of the patient's questions and concerns were addressed. Minocycline Pregnancy And Lactation Text: This medication is Pregnancy Category D and not consider safe during pregnancy. It is also excreted in breast milk. Dapsone Pregnancy And Lactation Text: This medication is Pregnancy Category C and is not considered safe during pregnancy or breast feeding. Minocycline Counseling: Patient advised regarding possible photosensitivity and discoloration of the teeth, skin, lips, tongue and gums.  Patient instructed to avoid sunlight, if possible.  When exposed to sunlight, patients should wear protective clothing, sunglasses, and sunscreen.  The patient was instructed to call the office immediately if the following severe adverse effects occur:  hearing changes, easy bruising/bleeding, severe headache, or vision changes.  The patient verbalized understanding of the proper use and possible adverse effects of minocycline.  All of the patient's questions and concerns were addressed. Doxycycline Pregnancy And Lactation Text: This medication is Pregnancy Category D and not consider safe during pregnancy. It is also excreted in breast milk but is considered safe for shorter treatment courses. Tetracycline Counseling: Patient counseled regarding possible photosensitivity and increased risk for sunburn.  Patient instructed to avoid sunlight, if possible.  When exposed to sunlight, patients should wear protective clothing, sunglasses, and sunscreen.  The patient was instructed to call the office immediately if the following severe adverse effects occur:  hearing changes, easy bruising/bleeding, severe headache, or vision changes.  The patient verbalized understanding of the proper use and possible adverse effects of tetracycline.  All of the patient's questions and concerns were addressed. Patient understands to avoid pregnancy while on therapy due to potential birth defects. Winlevi Counseling:  I discussed with the patient the risks of topical clascoterone including but not limited to erythema, scaling, itching, and stinging. Patient voiced their understanding. Bactrim Counseling:  I discussed with the patient the risks of sulfa antibiotics including but not limited to GI upset, allergic reaction, drug rash, diarrhea, dizziness, photosensitivity, and yeast infections.  Rarely, more serious reactions can occur including but not limited to aplastic anemia, agranulocytosis, methemoglobinemia, blood dyscrasias, liver or kidney failure, lung infiltrates or desquamative/blistering drug rashes. Topical Retinoid counseling:  Patient advised to apply a pea-sized amount only at bedtime and wait 30 minutes after washing their face before applying.  If too drying, patient may add a non-comedogenic moisturizer. The patient verbalized understanding of the proper use and possible adverse effects of retinoids.  All of the patient's questions and concerns were addressed. Azithromycin Pregnancy And Lactation Text: This medication is considered safe during pregnancy and is also secreted in breast milk. Topical Sulfur Applications Counseling: Topical Sulfur Counseling: Patient counseled that this medication may cause skin irritation or allergic reactions.  In the event of skin irritation, the patient was advised to reduce the amount of the drug applied or use it less frequently.   The patient verbalized understanding of the proper use and possible adverse effects of topical sulfur application.  All of the patient's questions and concerns were addressed. Aklief counseling:  Patient advised to apply a pea-sized amount only at bedtime and wait 30 minutes after washing their face before applying.  If too drying, patient may add a non-comedogenic moisturizer.  The most commonly reported side effects including irritation, redness, scaling, dryness, stinging, burning, itching, and increased risk of sunburn.  The patient verbalized understanding of the proper use and possible adverse effects of retinoids.  All of the patient's questions and concerns were addressed. Spironolactone Pregnancy And Lactation Text: This medication can cause feminization of the male fetus and should be avoided during pregnancy. The active metabolite is also found in breast milk. Azithromycin Counseling:  I discussed with the patient the risks of azithromycin including but not limited to GI upset, allergic reaction, drug rash, diarrhea, and yeast infections. Birth Control Pills Pregnancy And Lactation Text: This medication should be avoided if pregnant and for the first 30 days post-partum. Benzoyl Peroxide Counseling: Patient counseled that medicine may cause skin irritation and bleach clothing.  In the event of skin irritation, the patient was advised to reduce the amount of the drug applied or use it less frequently.   The patient verbalized understanding of the proper use and possible adverse effects of benzoyl peroxide.  All of the patient's questions and concerns were addressed. Isotretinoin Counseling: Patient should get monthly blood tests, not donate blood, not drive at night if vision affected, not share medication, and not undergo elective surgery for 6 months after tx completed. Side effects reviewed, pt to contact office should one occur. Winlevi Pregnancy And Lactation Text: This medication is considered safe during pregnancy and breastfeeding. High Dose Vitamin A Counseling: Side effects reviewed, pt to contact office should one occur. Topical Sulfur Applications Pregnancy And Lactation Text: This medication is Pregnancy Category C and has an unknown safety profile during pregnancy. It is unknown if this topical medication is excreted in breast milk. High Dose Vitamin A Pregnancy And Lactation Text: High dose vitamin A therapy is contraindicated during pregnancy and breast feeding. Aklief Pregnancy And Lactation Text: It is unknown if this medication is safe to use during pregnancy.  It is unknown if this medication is excreted in breast milk.  Breastfeeding women should use the topical cream on the smallest area of the skin for the shortest time needed while breastfeeding.  Do not apply to nipple and areola. Azelaic Acid Counseling: Patient counseled that medicine may cause skin irritation and to avoid applying near the eyes.  In the event of skin irritation, the patient was advised to reduce the amount of the drug applied or use it less frequently.   The patient verbalized understanding of the proper use and possible adverse effects of azelaic acid.  All of the patient's questions and concerns were addressed. Sarecycline Counseling: Patient advised regarding possible photosensitivity and discoloration of the teeth, skin, lips, tongue and gums.  Patient instructed to avoid sunlight, if possible.  When exposed to sunlight, patients should wear protective clothing, sunglasses, and sunscreen.  The patient was instructed to call the office immediately if the following severe adverse effects occur:  hearing changes, easy bruising/bleeding, severe headache, or vision changes.  The patient verbalized understanding of the proper use and possible adverse effects of sarecycline.  All of the patient's questions and concerns were addressed. Birth Control Pills Counseling: Birth Control Pill Counseling: I discussed with the patient the potential side effects of OCPs including but not limited to increased risk of stroke, heart attack, thrombophlebitis, deep venous thrombosis, hepatic adenomas, breast changes, GI upset, headaches, and depression.  The patient verbalized understanding of the proper use and possible adverse effects of OCPs. All of the patient's questions and concerns were addressed. Azelaic Acid Pregnancy And Lactation Text: This medication is considered safe during pregnancy and breast feeding. Erythromycin Counseling:  I discussed with the patient the risks of erythromycin including but not limited to GI upset, allergic reaction, drug rash, diarrhea, increase in liver enzymes, and yeast infections. Tazorac Counseling:  Patient advised that medication is irritating and drying.  Patient may need to apply sparingly and wash off after an hour before eventually leaving it on overnight.  The patient verbalized understanding of the proper use and possible adverse effects of tazorac.  All of the patient's questions and concerns were addressed. Bactrim Pregnancy And Lactation Text: This medication is Pregnancy Category D and is known to cause fetal risk.  It is also excreted in breast milk. Isotretinoin Pregnancy And Lactation Text: This medication is Pregnancy Category X and is considered extremely dangerous during pregnancy. It is unknown if it is excreted in breast milk. Detail Level: Detailed Topical Retinoid Pregnancy And Lactation Text: This medication is Pregnancy Category C. It is unknown if this medication is excreted in breast milk. Topical Clindamycin Counseling: Patient counseled that this medication may cause skin irritation or allergic reactions.  In the event of skin irritation, the patient was advised to reduce the amount of the drug applied or use it less frequently.   The patient verbalized understanding of the proper use and possible adverse effects of clindamycin.  All of the patient's questions and concerns were addressed.

## 2023-01-01 NOTE — ED PROVIDER NOTES
[address] [facility]  EMERGENCY DEPARTMENT ENCOUNTER        Pt Name: Ki Pace  MRN: [id number]  [place] 1970  Date of evaluation: 12/6/2021  Provider: ULISES Cleaning - CNP  PCP: Danny Tian MD  Note Started: 2:37 PM EST       I have seen and evaluated this patient with my supervising physician Dajuan Dunham MD.      [address]       Chief Complaint   Patient presents with    Wound Check     pt brought in by EMS for wound to L flank area that has been progressively gotten worse over the last 2 months. HISTORY OF PRESENT ILLNESS   (Location/Symptom, Timing/Onset, Context/Setting, Quality, Duration, Modifying Factors, Severity)  Note limiting factors. Chief Complaint: Pressure ulcer involving the left buttock    Ki Pace is a 46 y.o. male who presents to the emergency department with symptoms of a worsening pressure ulcer involving the left buttock. He states that for the last couple months he has been dealing with a pressure ulcer involving the left buttock. He states that he noted some drainage and some foul-smelling discharge from the site. He states there is also mild erythema around the site. Patient states that he is a paraplegic from a traumatic accident approximately 4 years ago. He states that he is bedbound/wheelchair bound. States that he has had issues with this wound now for the last couple months but last week or so symptoms of worsened. He also had a fever approximately 2 to 3 days ago. Denies any swelling involving his scrotum or penis. Denies any other acute complaints. No chest pain or sob. Nursing Notes were all reviewed and agreed with or any disagreements were addressed in the HPI. REVIEW OF SYSTEMS    (2-9 systems for level 4, 10 or more for level 5)     Review of Systems   Constitutional: Positive for fever. Negative for chills and diaphoresis.    HENT: Negative for congestion, ear pain, rhinorrhea and sore throat. Eyes: Negative for pain and visual disturbance. Respiratory: Negative for cough and shortness of breath. Cardiovascular: Negative for chest pain and leg swelling. Gastrointestinal: Negative for abdominal pain, blood in stool, diarrhea, nausea and vomiting. Genitourinary: Negative for difficulty urinating, dysuria, flank pain and frequency. Musculoskeletal: Negative for back pain and neck pain. Skin: Positive for wound. Negative for rash. Neurological: Negative for dizziness and light-headedness. PAST MEDICAL HISTORY     Past Medical History:   Diagnosis Date    DVT of leg (deep venous thrombosis) ([facility] [address] 04/2017    RLE    GERD (gastroesophageal reflux disease)     Injury, crush, back     Paraplegia ([facility] [address] 02/2017    tree fell on patient       SURGICAL HISTORY     Past Surgical History:   Procedure Laterality Date    BACK SURGERY      spine, collar bone, rib fx repairs    FRACTURE SURGERY         CURRENTMEDICATIONS       Previous Medications    BACLOFEN (LIORESAL) 10 MG TABLET    Take 1 tablet by mouth 2 times daily    BACLOFEN (LIORESAL) 20 MG TABLET    TAKE 1 TABLET BY MOUTH EVERY NIGHT AT BEDTIME    BISACODYL (DULCOLAX) 10 MG SUPPOSITORY    USe one daily as needed    CATHETERS (BARDEX LUBRICATH CATHETER) MISC    Please dispense lubrication for use of catheter.     CATHETERS KIT    1 each by Does not apply route every 3 hours    DISPOSABLE GLOVES MISC    Size large dispense 2 boxes per month    ESCITALOPRAM (LEXAPRO) 10 MG TABLET    Take 1 tablet by mouth daily    INCONTINENCE SUPPLY DISPOSABLE (DEPEND REAL-FIT BRIEFS FOR MEN) MISC    1 each by Does not apply route See Admin Instructions Use for urinary and stool incontinence    INCONTINENCE SUPPLY DISPOSABLE (DISPOSABLE LINERS) MISC    Dispense 1 box per month of Disposable Bed pads/chucks    OXYBUTYNIN (DITROPAN) 5 MG TABLET    Take 1 tablet by mouth 3 times daily    POLYETHYLENE GLYCOL (GLYCOLAX) POWDER    Take 17 grams mixed in liquid by mouth daily    RIVAROXABAN (XARELTO) 20 MG TABS TABLET    TAKE 1 TABLET BY MOUTH DAILY    SENNA-DOCUSATE (DOK PLUS) 8.6-50 MG PER TABLET    TAKE 1 TABLET BY MOUTH EVERY NIGHT AT BEDTIME       ALLERGIES     Patient has no known allergies. FAMILYHISTORY       Family History   Problem Relation Age of Onset    No Known Problems Mother     No Known Problems Father     No Known Problems Sister     Dementia Maternal Grandmother     Coronary Art Dis Maternal Grandfather     Tuberculosis Paternal Grandmother     No Known Problems Paternal Grandfather         SOCIAL HISTORY       Social History     Socioeconomic History    Marital status:      Spouse name: None    Number of children: None    Years of education: None    Highest education level: None   Occupational History    Occupation:    Tobacco Use    Smoking status: Never Smoker    Smokeless tobacco: Never Used   Vaping Use    Vaping Use: Never used   Substance and Sexual Activity    Alcohol use: No    Drug use: No    Sexual activity: Not Currently     Partners: Female   Other Topics Concern    None   Social History Narrative    None     Social Determinants of Health     Financial Resource Strain:     Difficulty of Paying Living Expenses: Not on file   Food Insecurity:     Worried About Running Out of Food in the Last Year: Not on file    Rene of Food in the Last Year: Not on file   Transportation Needs:     Lack of Transportation (Medical): Not on file    Lack of Transportation (Non-Medical):  Not on file   Physical Activity:     Days of Exercise per Week: Not on file    Minutes of Exercise per Session: Not on file   Stress:     Feeling of Stress : Not on file   Social Connections:     Frequency of Communication with Friends and Family: Not on file    Frequency of Social Gatherings with Friends and Family: Not on file    Attends Spiritism Services: Not on file    Active Member of Clubs or Organizations: Not on file   [address] or Organization Meetings: Not on file    Marital Status: Not on file   Intimate Partner Violence:     Fear of Current or Ex-Partner: Not on file    Emotionally Abused: Not on file    Physically Abused: Not on file    Sexually Abused: Not on file   Housing Stability:     Unable to Pay for Housing in the Last Year: Not on file    Number of [place] in the Last Year: Not on file    Unstable Housing in the Last Year: Not on file       SCREENINGS             PHYSICAL EXAM    (up to 7 for level 4, 8 or more for level 5)     ED Triage Vitals [12/06/21 1347]   BP Temp Temp Source Pulse Resp SpO2 Height Weight   (!) 146/76 97.9 °F (36.6 °C) Oral 75 18 97 % 6' 1\" (1.854 m) 220 lb (99.8 kg)       Physical Exam  Vitals and nursing note reviewed. Constitutional:       Appearance: Normal appearance. He is not toxic-appearing or diaphoretic. HENT:      Head: Normocephalic and atraumatic. Nose: Nose normal.   Eyes:      General:         Right eye: No discharge. Left eye: No discharge. Cardiovascular:      Rate and Rhythm: Normal rate and regular rhythm. Pulses: Normal pulses. Heart sounds: Normal heart sounds. Pulmonary:      Effort: Pulmonary effort is normal. No respiratory distress. Abdominal:      Palpations: Abdomen is soft. Tenderness: There is no abdominal tenderness. Musculoskeletal:         General: Normal range of motion. Cervical back: Normal range of motion and neck supple. Skin:     General: Skin is warm and dry. Capillary Refill: Capillary refill takes less than 2 seconds. Comments: The pressure wound has mild amount of surrounding erythema. Also has purulent foul smelling drainage from the site. Neurological:      General: No focal deficit present. Mental Status: He is alert and oriented to person, place, and time. GCS: GCS eye subscore is 4. GCS verbal subscore is 5. Sensory: Sensory deficit present. absence of a cardiologist.  Please see their note for interpretation of EKG. RADIOLOGY:   Non-plain film images such as CT, Ultrasound and MRI are read by the radiologist. Plain radiographic images are visualized andpreliminarily interpreted by the  ED Provider with the below findings:        Interpretation donald Radiologist below, if available at the time of this note:    CT PELVIS W CONTRAST Additional Contrast? None    (Results Pending)     No results found. PROCEDURES   Unless otherwise noted below, none     Procedures    CRITICAL CARE TIME   N/A    CONSULTS:  None      EMERGENCY DEPARTMENT COURSE and DIFFERENTIAL DIAGNOSIS/MDM:   Vitals:    Vitals:    12/06/21 1347 12/06/21 1757   BP: (!) 146/76 136/76   Pulse: 75 79   Resp: 18 16   Temp: 97.9 °F (36.6 °C)    TempSrc: Oral    SpO2: 97% 100%   Weight: 220 lb (99.8 kg)    Height: 6' 1\" (1.854 m)        Patient was given thefollowing medications:  Medications   piperacillin-tazobactam (ZOSYN) 3,375 mg in sodium chloride 0.9 % 100 mL IVPB (mini-bag) (0 mg IntraVENous Stopped 12/6/21 1755)   iopamidol (ISOVUE-370) 76 % injection 75 mL (75 mLs IntraVENous Given 12/6/21 1730)        Care transitioned to Iwona Boyle MD.      FINAL IMPRESSION    No diagnosis found. DISPOSITION/PLAN   DISPOSITION        PATIENT REFERREDTO:  No follow-up provider specified.     DISCHARGE MEDICATIONS:  New Prescriptions    No medications on file       DISCONTINUED MEDICATIONS:  Discontinued Medications    No medications on file              (Please note that portions ofthis note were completed with a voice recognition program.  Efforts were made to edit the dictations but occasionally words are mis-transcribed.)    ULISES Rashid CNP (electronically signed)            ULISES Rashid CNP  12/06/21 1374 no

## 2023-01-12 DIAGNOSIS — G82.20 PARAPLEGIA (HCC): ICD-10-CM

## 2023-01-12 RX ORDER — DIAZEPAM 2 MG/1
TABLET ORAL
Qty: 30 TABLET | Refills: 0 | Status: SHIPPED | OUTPATIENT
Start: 2023-01-12 | End: 2023-02-11

## 2023-01-17 DIAGNOSIS — Z86.718 PERSONAL HISTORY OF DVT (DEEP VEIN THROMBOSIS): ICD-10-CM

## 2023-01-31 RX ORDER — BACLOFEN 10 MG/1
TABLET ORAL
Qty: 60 TABLET | Refills: 0 | OUTPATIENT
Start: 2023-01-31

## 2023-01-31 NOTE — TELEPHONE ENCOUNTER
Last office visit 11/30/2021, advised to follow-up 0, next appointment 0.   Second appt letter mailed today

## 2023-01-31 NOTE — LETTER
[address]  [address] [facility]  Phone: [phone]  Fax: [phone]    Rasheed Singh MD        January 31, 2023    [address]  [facility] [address]      Dear Jeffery Lopez: We recently received a refill request for your medications and upon review you are due for a routine check up. Please contact our office to schedule an appointment. We look forward to participating in your health care. If you have any questions or concerns, please don't hesitate to call.       Sincerely,        ANTONIO Barajas

## 2023-02-07 DIAGNOSIS — G82.20 PARAPLEGIA (HCC): ICD-10-CM

## 2023-02-07 DIAGNOSIS — Z86.718 PERSONAL HISTORY OF DVT (DEEP VEIN THROMBOSIS): ICD-10-CM

## 2023-02-07 DIAGNOSIS — S24.101A SPINAL CORD INJURY, T1-T6 (HCC): ICD-10-CM

## 2023-02-07 DIAGNOSIS — F32.A DEPRESSION, UNSPECIFIED DEPRESSION TYPE: ICD-10-CM

## 2023-02-07 RX ORDER — DIAZEPAM 2 MG/1
TABLET ORAL
Qty: 30 TABLET | Refills: 0 | OUTPATIENT
Start: 2023-02-07 | End: 2023-03-09

## 2023-02-07 RX ORDER — ESCITALOPRAM OXALATE 10 MG/1
TABLET ORAL
Qty: 90 TABLET | Refills: 3 | Status: SHIPPED | OUTPATIENT
Start: 2023-02-07

## 2023-02-07 NOTE — TELEPHONE ENCOUNTER
Date of last refill of this med was 11/2/2023, # of pills given 30 and # of refills given 0. Their next appointment is 2/8/2023, the last date patient was seen was 11/30/2021. Does patient have medication agreement on file? No  Has drug screen been done in last 12 months if needed?  no

## 2023-02-08 ENCOUNTER — TELEMEDICINE (OUTPATIENT)
Dept: FAMILY MEDICINE CLINIC | Age: 53
End: 2023-02-08
Payer: COMMERCIAL

## 2023-02-08 DIAGNOSIS — S24.102D T5 SPINAL CORD INJURY, SUBSEQUENT ENCOUNTER (HCC): ICD-10-CM

## 2023-02-08 DIAGNOSIS — G82.20 PARAPLEGIA (HCC): Primary | ICD-10-CM

## 2023-02-08 DIAGNOSIS — K59.2 NEUROGENIC BOWEL: ICD-10-CM

## 2023-02-08 DIAGNOSIS — N31.9 NEUROGENIC BLADDER: ICD-10-CM

## 2023-02-08 DIAGNOSIS — Z87.2 HISTORY OF PRESSURE ULCER: ICD-10-CM

## 2023-02-08 DIAGNOSIS — Z86.718 PERSONAL HISTORY OF DVT (DEEP VEIN THROMBOSIS): ICD-10-CM

## 2023-02-08 DIAGNOSIS — Z13.220 SCREENING, LIPID: ICD-10-CM

## 2023-02-08 DIAGNOSIS — S24.101A SPINAL CORD INJURY, T1-T6 (HCC): ICD-10-CM

## 2023-02-08 PROCEDURE — 99214 OFFICE O/P EST MOD 30 MIN: CPT | Performed by: FAMILY MEDICINE

## 2023-02-08 PROCEDURE — 3017F COLORECTAL CA SCREEN DOC REV: CPT | Performed by: FAMILY MEDICINE

## 2023-02-08 PROCEDURE — G8427 DOCREV CUR MEDS BY ELIG CLIN: HCPCS | Performed by: FAMILY MEDICINE

## 2023-02-08 RX ORDER — AMOXICILLIN 250 MG
CAPSULE ORAL
Qty: 30 TABLET | Refills: 5 | Status: SHIPPED | OUTPATIENT
Start: 2023-02-08

## 2023-02-08 RX ORDER — DIAZEPAM 2 MG/1
2 TABLET ORAL NIGHTLY PRN
Qty: 30 TABLET | Refills: 2 | Status: SHIPPED | OUTPATIENT
Start: 2023-02-08 | End: 2023-03-10

## 2023-02-08 ASSESSMENT — PATIENT HEALTH QUESTIONNAIRE - PHQ9
SUM OF ALL RESPONSES TO PHQ QUESTIONS 1-9: 0
SUM OF ALL RESPONSES TO PHQ9 QUESTIONS 1 & 2: 0
3. TROUBLE FALLING OR STAYING ASLEEP: 0
9. THOUGHTS THAT YOU WOULD BE BETTER OFF DEAD, OR OF HURTING YOURSELF: 0
6. FEELING BAD ABOUT YOURSELF - OR THAT YOU ARE A FAILURE OR HAVE LET YOURSELF OR YOUR FAMILY DOWN: 0
4. FEELING TIRED OR HAVING LITTLE ENERGY: 0
1. LITTLE INTEREST OR PLEASURE IN DOING THINGS: 0
8. MOVING OR SPEAKING SO SLOWLY THAT OTHER PEOPLE COULD HAVE NOTICED. OR THE OPPOSITE, BEING SO FIGETY OR RESTLESS THAT YOU HAVE BEEN MOVING AROUND A LOT MORE THAN USUAL: 0
2. FEELING DOWN, DEPRESSED OR HOPELESS: 0
5. POOR APPETITE OR OVEREATING: 0
7. TROUBLE CONCENTRATING ON THINGS, SUCH AS READING THE NEWSPAPER OR WATCHING TELEVISION: 0
10. IF YOU CHECKED OFF ANY PROBLEMS, HOW DIFFICULT HAVE THESE PROBLEMS MADE IT FOR YOU TO DO YOUR WORK, TAKE CARE OF THINGS AT HOME, OR GET ALONG WITH OTHER PEOPLE: 0
SUM OF ALL RESPONSES TO PHQ QUESTIONS 1-9: 0

## 2023-02-08 ASSESSMENT — ENCOUNTER SYMPTOMS: SHORTNESS OF BREATH: 0

## 2023-02-08 NOTE — PROGRESS NOTES
2023    TELEHEALTH EVALUATION -- Audio/Visual (During YPO-69 public health emergency)    HPI:    Del Gray (:  1970) has requested an audio/video evaluation for the following concern(s):    HPI   In need of new manual wheelchair. Hx of T5 spinal cord injury. Had tip of current wheelchair. Bent frame and seat bracket. Unable to ambulate w/o wheelchair. Able to propel w/ arms manually. Overall, measurements should be the same. Tries to do home therapy. Still self cathing. Disposable chux as well. Doing well overall w/ that. No recent infxn noted. Cushion needed for scooter. Prior hx of pressure ulcer on the left. In need of new cushion. Has had current for approx 2 yrs or so. Has been taking valium for spasms. Less spasms than prior. Still using baclofen as well. Doing fairly well w/ lexapro re mood. Worse after losing mom. Review of Systems   Constitutional:  Negative for fever. Respiratory:  Negative for shortness of breath. Cardiovascular:  Negative for chest pain and palpitations. Genitourinary:  Positive for difficulty urinating. Musculoskeletal:  Positive for myalgias. Neurological:  Positive for weakness. Prior to Visit Medications    Medication Sig Taking? Authorizing Provider   diazePAM (VALIUM) 2 MG tablet Take 1 tablet by mouth nightly as needed for Anxiety for up to 30 days.  Max Daily Amount: 2 mg Yes Dajuan Washburn MD   senna-docusate (STOOL SOFTENER PLUS LAXATIVE) 8.6-50 MG per tablet TAKE 1 TABLET BY MOUTH EVERY NIGHT AT BEDTIME Yes Dajuan Washburn MD   escitalopram (LEXAPRO) 10 MG tablet TAKE ONE TABLET BY MOUTH EVERY DAY Yes ULISES Bahena CNP   rivaroxaban (XARELTO) 20 MG TABS tablet TAKE 1 TABLET BY MOUTH DAILY Yes ULISES Bahena CNP   baclofen (LIORESAL) 10 MG tablet TAKE ONE TABLET BY MOUTH 2 TIMES A DAY Yes Dajuan Washburn MD   baclofen (LIORESAL) 20 MG tablet TAKE 1 TABLET BY MOUTH EVERY NIGHT AT BEDTIME Yes Madelyn Crowell MD   bisacodyl (DULCOLAX) 10 MG suppository USE ONE AS DIRECTED AS NEEDED Yes Madelyn Crowell MD   oxybutynin (DITROPAN) 5 MG tablet TAKE ONE TABLET BY MOUTH 3 TIMES A DAY Yes Madelyn Crowell MD   Disposable Gloves (LATEX GLOVES LARGE) [address]  Madelyn Crowell MD   ammonium lactate (LAC-HYDRIN) 12 % lotion Apply to the driest areas on the feet and ankles daily as needed. Everett Cushing, MD   Catheters ([address] [place] [address]) [facility] Please dispense lubrication for use of catheter.   Caterina Islas MD   Incontinence Supply Disposable (DISPOSABLE LINERS) MISC Dispense 1 box per month of Disposable Bed pads/chucks  Caterina Islas MD   Catheters KIT 1 each by Does not apply route every 3 hours  Caterina Islas MD   Incontinence Supply Disposable (DEPEND REAL-FIT BRIEFS FOR MEN) MISC 1 each by Does not apply route See Admin Instructions Use for urinary and stool incontinence  Caterina Islas MD       Social History     Tobacco Use    Smoking status: Never    Smokeless tobacco: Never   Vaping Use    Vaping Use: Never used   Substance Use Topics    Alcohol use: No    Drug use: No        No Known Allergies,   Past Medical History:   Diagnosis Date    Cellulitis and abscess of buttock 12/07/2021    Clavicular fracture, closed, shaft 02/04/2017    Closed fracture of multiple ribs 02/04/2017    Closed fracture of scapula 02/04/2017    DVT of leg (deep venous thrombosis) ([address] 04/2017    RLE    Hemothorax, traumatic 02/04/2017    Hypertension     Injury, crush, back     Pneumothorax, closed, traumatic 02/04/2017    Pulmonary embolism without acute cor pulmonale (HCC)     SAH (subarachnoid hemorrhage) ([address] 03/08/2017    Spinal cord injury, thoracic (T1-T6) ([address] 02/2017    tree fell on patient    Stage II pressure ulcer of left buttock ([address] 09/27/2018    T6 vertebral fracture ([address] 02/04/2017   ,   Past Surgical History:   Procedure Laterality Date    BACK SURGERY      spine, collar bone, rib fx repairs    FRACTURE SURGERY         PHYSICAL EXAMINATION:  [ INSTRUCTIONS:  \"[x]\" Indicates a positive item  \"[]\" Indicates a negative item  -- DELETE ALL ITEMS NOT EXAMINED]  Vital Signs: (As obtained by patient/caregiver or practitioner observation)    Blood pressure-  Heart rate-    Respiratory rate-    Temperature-  Pulse oximetry-     Constitutional: [x] Appears well-developed and well-nourished [] No apparent distress      [] Abnormal-   Mental status  [x] Alert and awake  [x] Oriented to person/place/time []Able to follow commands      Eyes:  EOM    [x]  Normal  [] Abnormal-  Sclera  []  Normal  [] Abnormal -         Discharge []  None visible  [] Abnormal -    HENT:   [x] Normocephalic, atraumatic. [] Abnormal   [] Mouth/Throat: Mucous membranes are moist.     External Ears [] Normal  [] Abnormal-     Neck: [] No visualized mass     Pulmonary/Chest: [x] Respiratory effort normal.  [x] No visualized signs of difficulty breathing or respiratory distress        [] Abnormal-      Musculoskeletal:   [] Normal gait with no signs of ataxia         [] Normal range of motion of neck        [x] Abnormal-baseline paraplegia      Neurological:        [] No Facial Asymmetry (Cranial nerve 7 motor function) (limited exam to video visit)          [] No gaze palsy        [] Abnormal-         Skin:        [x] No significant exanthematous lesions or discoloration noted on facial skin         [] Abnormal-            Psychiatric:       [x] Normal Affect [] No Hallucinations        [] Abnormal-     Other pertinent observable physical exam findings-     Due to this being a TeleHealth encounter, evaluation of the following organ systems is limited: Vitals/Constitutional/EENT/Resp/CV/GI//MS/Neuro/Skin/Heme-Lymph-Imm. ASSESSMENT/PLAN:  1. Paraplegia ([facility]  Refill medication as below. Prior history of spinal cord injury and subsequent paraplegia. In need of a new wheel Chair.   Current wheelchair has bent frame. Patient prefers wider tires to help with appropriate terrain. Also in need of a new cushion for his scooter. He does have a history of a pressure ulcer as listed below and has seen wound care in the past.  Current cushion is an adequate. Would benefit from a new cushion to minimize risk of subsequent issues and injury. Patient is able to propel himself with a manual wheelchair. Due to his history of paraplegia, he is unable to ambulate with a walker, cane, or crutches. - diazePAM (VALIUM) 2 MG tablet; Take 1 tablet by mouth nightly as needed for Anxiety for up to 30 days. Max Daily Amount: 2 mg  Dispense: 30 tablet; Refill: 2    2. T5 spinal cord injury, subsequent encounter ([facility]  See above    3. Neurogenic bowel  Refill stool softener    4. Neurogenic bladder  Continue to self cath at this time. Overall doing well with this. Continue medication    5. History of pressure ulcer  No current pressure ulcers per history. See above    6. Screening, lipid  Repeat labs at another location. Order sent  - Lipid Panel; Future  - Comprehensive Metabolic Panel; Future    7. Spinal cord injury, T1-T6 ([facility]  See above. Refill medication  - senna-docusate (STOOL SOFTENER PLUS LAXATIVE) 8.6-50 MG per tablet; TAKE 1 TABLET BY MOUTH EVERY NIGHT AT BEDTIME  Dispense: 30 tablet; Refill: 5    8. Personal history of DVT (deep vein thrombosis)  Tolerating Xarelto. History of DVT. Repeat labs as below  - CBC with Auto Differential; Future    No follow-ups on file. An  electronic signature was used to authenticate this note. --Madelyn Crowell MD on 2/8/2023 at 2:59 PM    Initially face-to-face visit with video and audio. Due to technical difficulties, visit was completed with audio only. Patient was observed over video and discussed over video initially. This document was prepared by a combination of typing and transcription through a voice recognition software.       Pursuant to the emergency declaration under the [facility] waiver authority and the [employer] Act, this Virtual  Visit was conducted, with patient's consent, to reduce the patient's risk of exposure to COVID-19 and provide continuity of care for an established patient. Services were provided through a video synchronous discussion virtually to substitute for in-person clinic visit.

## 2023-02-27 RX ORDER — BACLOFEN 10 MG/1
TABLET ORAL
Qty: 60 TABLET | Refills: 5 | Status: SHIPPED | OUTPATIENT
Start: 2023-02-27

## 2023-03-08 DIAGNOSIS — S24.101A SPINAL CORD INJURY, T1-T6 (HCC): ICD-10-CM

## 2023-03-08 RX ORDER — BISACODYL 10 MG
SUPPOSITORY, RECTAL RECTAL
Qty: 30 SUPPOSITORY | Refills: 5 | Status: SHIPPED | OUTPATIENT
Start: 2023-03-08

## 2023-05-13 DIAGNOSIS — G82.20 PARAPLEGIA (HCC): ICD-10-CM

## 2023-05-15 RX ORDER — DIAZEPAM 2 MG/1
2 TABLET ORAL NIGHTLY PRN
Qty: 30 TABLET | Refills: 0 | Status: SHIPPED | OUTPATIENT
Start: 2023-05-15 | End: 2023-06-14

## 2023-05-31 ENCOUNTER — TELEPHONE (OUTPATIENT)
Dept: FAMILY MEDICINE CLINIC | Age: 53
End: 2023-05-31

## 2023-05-31 NOTE — TELEPHONE ENCOUNTER
Pt called and wanted to know if you could prescribe some wound dressing and packing supplies (triad, aquatel advantage, curad)  to  [facility]?

## 2023-06-06 ENCOUNTER — TELEPHONE (OUTPATIENT)
Dept: FAMILY MEDICINE CLINIC | Age: 53
End: 2023-06-06

## 2023-06-06 DIAGNOSIS — S24.101A SPINAL CORD INJURY, T1-T6 (HCC): ICD-10-CM

## 2023-06-06 RX ORDER — OXYBUTYNIN CHLORIDE 5 MG/1
TABLET ORAL
Qty: 270 TABLET | Refills: 0 | Status: SHIPPED | OUTPATIENT
Start: 2023-06-06

## 2023-06-06 NOTE — TELEPHONE ENCOUNTER
Patient states he changes it at least 1 time a day and his aunt measured it at 4cm wide. He says he is unable to get anywhere since his dad passed away.

## 2023-06-06 NOTE — TELEPHONE ENCOUNTER
[facility] is needing the size of wound, location of wound (left buttocks), and number of times dressing needs changed in order to fill recent script sent

## 2023-06-06 NOTE — TELEPHONE ENCOUNTER
Pt was wondering if he could get a script for wound packing material sent to [address] until [facility] can [facility] ?

## 2023-06-06 NOTE — TELEPHONE ENCOUNTER
Okay to send order, but I cannot comment on the wound otherwise. He has not physically been seen here in over 2 years. I would recommend working on transportation to follow-up with wound care at [place].

## 2023-06-08 DIAGNOSIS — S24.101A SPINAL CORD INJURY, T1-T6 (HCC): ICD-10-CM

## 2023-06-08 RX ORDER — BACLOFEN 20 MG/1
TABLET ORAL
Qty: 30 TABLET | Refills: 3 | Status: SHIPPED | OUTPATIENT
Start: 2023-06-08

## 2023-06-15 PROBLEM — L89.322 PRESSURE INJURY OF LEFT BUTTOCK, STAGE 2 (HCC): Status: ACTIVE | Noted: 2023-06-15

## 2023-06-22 PROBLEM — E66.3 OVERWEIGHT (BMI 25.0-29.9): Status: ACTIVE | Noted: 2023-06-22

## 2023-06-22 PROBLEM — L89.612 PRESSURE ULCER OF RIGHT HEEL, STAGE 2 (HCC): Status: RESOLVED | Noted: 2022-03-10 | Resolved: 2023-06-22

## 2023-06-22 PROBLEM — L92.9 HYPERGRANULATION: Status: RESOLVED | Noted: 2022-02-03 | Resolved: 2023-06-22

## 2023-06-29 ENCOUNTER — HOSPITAL ENCOUNTER (OUTPATIENT)
Dept: WOUND CARE | Age: 53
Discharge: HOME | End: 2023-06-29
Payer: COMMERCIAL

## 2023-06-29 VITALS
SYSTOLIC BLOOD PRESSURE: 169 MMHG | BODY MASS INDEX: 28.92 KG/M2 | HEART RATE: 105 BPM | WEIGHT: 218.2 LBS | DIASTOLIC BLOOD PRESSURE: 86 MMHG | RESPIRATION RATE: 18 BRPM | HEIGHT: 73 IN | TEMPERATURE: 99 F

## 2023-06-29 DIAGNOSIS — L89.324 PRESSURE INJURY OF LEFT ISCHIUM, STAGE 4 (HCC): ICD-10-CM

## 2023-06-29 DIAGNOSIS — L89.322 PRESSURE INJURY OF LEFT BUTTOCK, STAGE 2 (HCC): Primary | ICD-10-CM

## 2023-06-29 PROCEDURE — 17250 CHEM CAUT OF GRANLTJ TISSUE: CPT

## 2023-06-29 RX ORDER — LIDOCAINE 50 MG/G
OINTMENT TOPICAL ONCE
Status: DISCONTINUED | OUTPATIENT
Start: 2023-06-29 | End: 2023-06-30 | Stop reason: HOSPADM

## 2023-06-29 RX ORDER — LIDOCAINE 40 MG/G
CREAM TOPICAL ONCE
OUTPATIENT
Start: 2023-06-29 | End: 2023-06-29

## 2023-06-29 RX ORDER — LIDOCAINE 40 MG/G
CREAM TOPICAL ONCE
Status: DISCONTINUED | OUTPATIENT
Start: 2023-06-29 | End: 2023-06-30 | Stop reason: HOSPADM

## 2023-06-29 RX ORDER — LIDOCAINE HYDROCHLORIDE 40 MG/ML
SOLUTION TOPICAL ONCE
Status: DISCONTINUED | OUTPATIENT
Start: 2023-06-29 | End: 2023-06-30 | Stop reason: HOSPADM

## 2023-06-29 RX ORDER — LIDOCAINE HYDROCHLORIDE 40 MG/ML
SOLUTION TOPICAL ONCE
OUTPATIENT
Start: 2023-06-29 | End: 2023-06-29

## 2023-06-29 RX ORDER — BACITRACIN ZINC AND POLYMYXIN B SULFATE 500; 1000 [USP'U]/G; [USP'U]/G
OINTMENT TOPICAL ONCE
OUTPATIENT
Start: 2023-06-29 | End: 2023-06-29

## 2023-06-29 RX ORDER — BACITRACIN ZINC AND POLYMYXIN B SULFATE 500; 1000 [USP'U]/G; [USP'U]/G
OINTMENT TOPICAL ONCE
Status: DISCONTINUED | OUTPATIENT
Start: 2023-06-29 | End: 2023-06-30 | Stop reason: HOSPADM

## 2023-06-29 RX ORDER — LIDOCAINE 50 MG/G
OINTMENT TOPICAL ONCE
OUTPATIENT
Start: 2023-06-29 | End: 2023-06-29

## 2023-06-29 ASSESSMENT — PAIN SCALES - GENERAL: PAINLEVEL_OUTOF10: 0

## 2023-07-11 DIAGNOSIS — G82.20 PARAPLEGIA (HCC): ICD-10-CM

## 2023-07-11 RX ORDER — DIAZEPAM 2 MG/1
TABLET ORAL
Qty: 30 TABLET | Refills: 0 | Status: SHIPPED | OUTPATIENT
Start: 2023-07-11 | End: 2023-08-10

## 2023-07-20 ENCOUNTER — TELEPHONE (OUTPATIENT)
Dept: WOUND CARE | Age: 53
End: 2023-07-20

## 2023-07-20 NOTE — TELEPHONE ENCOUNTER
Received a message from his aunt / caregiver late yesterday afternoon. Sounds like he's doing very well overall, really adhering to offloading recommendations. No additional wound bleeding, no fever. Decreased wound drainage, and the wound is < 1 cm deep now. Photo from the last couple of days is below. I suggested the same ongoing wound care plan until it's completely healed, just using a bit of sterile water or saline to moisten the alginate if it's becoming too dry. Asked whether he had reconsidered the referral to plastic surgery for longer-term plans of wound closure via a flap, and it sounds like he doesn't feel that he can go that route at this time. Both of his parents passed away within the last year or so, and I believe at least one of them had a rough time at a nursing facility, and Yuli Hayes is having a hard time agreeing to a plan that would have HIM stay in a facility for a period of time too. Very unfortunate that that's what's holding him back, but understandable. If the wound remains healed for a long time, then that's great news. If he has another recurrence in the next couple of years, and still doesn't feel able to agree to at least a plastic surgical consult, I can certainly have him talk to a LISW who works with the [facility], to see if she could help him work through some of his psychological stresses around this issue. F/U here PRN.         Electronically signed by Kelvin Murrell MD on 7/20/2023 at 8:09 AM

## 2023-08-03 DIAGNOSIS — S24.101A SPINAL CORD INJURY, T1-T6 (HCC): ICD-10-CM

## 2023-08-03 RX ORDER — BISACODYL 10 MG
SUPPOSITORY, RECTAL RECTAL
Qty: 30 SUPPOSITORY | Refills: 5 | Status: SHIPPED | OUTPATIENT
Start: 2023-08-03

## 2023-08-11 DIAGNOSIS — G82.20 PARAPLEGIA (HCC): ICD-10-CM

## 2023-08-14 DIAGNOSIS — S24.101A SPINAL CORD INJURY, T1-T6 (HCC): ICD-10-CM

## 2023-08-14 DIAGNOSIS — K59.2 NEUROGENIC BOWEL: Primary | ICD-10-CM

## 2023-08-14 RX ORDER — DIAZEPAM 2 MG/1
TABLET ORAL
Qty: 30 TABLET | Refills: 0 | Status: SHIPPED | OUTPATIENT
Start: 2023-08-14 | End: 2023-09-13

## 2023-08-14 RX ORDER — OXYBUTYNIN CHLORIDE 5 MG/1
TABLET ORAL
Qty: 270 TABLET | Refills: 0 | Status: SHIPPED | OUTPATIENT
Start: 2023-08-14

## 2023-08-14 RX ORDER — POLYETHYLENE GLYCOL 3350 17 G/17G
17 POWDER, FOR SOLUTION ORAL DAILY PRN
Qty: 1530 G | Refills: 0 | Status: SHIPPED | OUTPATIENT
Start: 2023-08-14 | End: 2023-11-12

## 2023-08-14 NOTE — TELEPHONE ENCOUNTER
Date of last refill of this med was 7/11/23, # of pills given 30 and # of refills given 0. Their next appointment is not scheduled, the last date patient was seen was 2/8/23. Does patient have medication agreement on file?  No

## 2023-09-11 DIAGNOSIS — G82.20 PARAPLEGIA (HCC): ICD-10-CM

## 2023-09-11 RX ORDER — DIAZEPAM 2 MG/1
TABLET ORAL
Qty: 30 TABLET | Refills: 0 | Status: SHIPPED | OUTPATIENT
Start: 2023-09-11 | End: 2023-10-11

## 2023-09-22 DIAGNOSIS — G82.20 PARAPLEGIA (HCC): ICD-10-CM

## 2023-09-22 RX ORDER — DIAZEPAM 2 MG/1
TABLET ORAL
Qty: 30 TABLET | Refills: 0 | Status: SHIPPED | OUTPATIENT
Start: 2023-09-22 | End: 2023-10-22

## 2023-09-29 DIAGNOSIS — S24.101A SPINAL CORD INJURY, T1-T6 (HCC): ICD-10-CM

## 2023-09-29 RX ORDER — AMOXICILLIN 250 MG
CAPSULE ORAL
Qty: 30 TABLET | Refills: 5 | Status: SHIPPED | OUTPATIENT
Start: 2023-09-29

## 2023-10-09 ENCOUNTER — COMMUNITY OUTREACH (OUTPATIENT)
Dept: FAMILY MEDICINE CLINIC | Age: 53
End: 2023-10-09

## 2023-10-19 DIAGNOSIS — S24.101A SPINAL CORD INJURY, T1-T6 (HCC): ICD-10-CM

## 2023-10-19 RX ORDER — OXYBUTYNIN CHLORIDE 5 MG/1
TABLET ORAL
Qty: 270 TABLET | Refills: 3 | Status: SHIPPED | OUTPATIENT
Start: 2023-10-19

## 2023-10-19 RX ORDER — BACLOFEN 10 MG/1
TABLET ORAL
Qty: 180 TABLET | Refills: 0 | Status: SHIPPED | OUTPATIENT
Start: 2023-10-19

## 2023-11-06 DIAGNOSIS — G82.20 PARAPLEGIA (HCC): ICD-10-CM

## 2023-11-06 RX ORDER — DIAZEPAM 2 MG/1
TABLET ORAL
Qty: 30 TABLET | Refills: 0 | Status: SHIPPED | OUTPATIENT
Start: 2023-11-06 | End: 2023-12-06

## 2023-11-10 DIAGNOSIS — K59.2 NEUROGENIC BOWEL: ICD-10-CM

## 2023-11-10 RX ORDER — POLYETHYLENE GLYCOL 3350 17 G/17G
POWDER ORAL
Qty: 116 G | Refills: 0 | Status: SHIPPED | OUTPATIENT
Start: 2023-11-10

## 2023-11-27 ENCOUNTER — SCHEDULED TELEPHONE ENCOUNTER (OUTPATIENT)
Dept: FAMILY MEDICINE CLINIC | Age: 53
End: 2023-11-27
Payer: COMMERCIAL

## 2023-11-27 DIAGNOSIS — G82.20 PARAPLEGIA (HCC): Primary | ICD-10-CM

## 2023-11-27 DIAGNOSIS — Z86.718 PERSONAL HISTORY OF DVT (DEEP VEIN THROMBOSIS): ICD-10-CM

## 2023-11-27 DIAGNOSIS — S24.101A SPINAL CORD INJURY, T1-T6 (HCC): ICD-10-CM

## 2023-11-27 DIAGNOSIS — S24.102S T5 SPINAL CORD INJURY, SEQUELA (HCC): ICD-10-CM

## 2023-11-27 DIAGNOSIS — F32.A DEPRESSION, UNSPECIFIED DEPRESSION TYPE: ICD-10-CM

## 2023-11-27 PROCEDURE — 99442 PR PHYS/QHP TELEPHONE EVALUATION 11-20 MIN: CPT | Performed by: FAMILY MEDICINE

## 2023-11-27 RX ORDER — BACLOFEN 20 MG/1
20 TABLET ORAL NIGHTLY
Qty: 90 TABLET | Refills: 3 | Status: SHIPPED | OUTPATIENT
Start: 2023-11-27

## 2023-11-27 NOTE — PROGRESS NOTES
Stephanie Nath is a 48 y.o. male evaluated via telephone on 11/27/2023. Consent:  He and/or health care decision maker is aware that that he may receive a bill for this telephone service, depending on his insurance coverage, and has provided verbal consent to proceed: Yes      Documentation:  I communicated with the patient and/or health care decision maker about see below. Details of this discussion including any medical advice provided: see below. Had been to see wound care over the summer. Overall doing better. Less transfers. Has been relatively healed. No other new issue. T5 spinal cord injury. Ongoing issues w/ self cath. Ongoing issues. No recent infxn noted. Bowels near baseline. Overall near baseline. 2 strengths of baclofen. Has been on for some time. Still using valium prn. Has been on lexapro as well. Overall has helped mood, nerves. No known SE noted. Prior hx of dvt. Still on xarelto. I affirm this is a Patient Initiated Episode with an Established Patient who has not had a related appointment within my department in the past 7 days or scheduled within the next 24 hours. Total Time: minutes: 11-20 minutes    Note: not billable if this call serves to triage the patient into an appointment for the relevant concern    1. Paraplegia ([address])  Prior history of wound to the buttocks. Overall this is improved. Otherwise he is near his baseline. Tolerating medications fairly well. Using muscle relaxers as listed. They do provide some relief. He attempts to remain active. He continues to self cath. No evidence of infection recently. Overall doing fair and near baseline    2. T5 spinal cord injury, sequela ([address])  See above    3. Depression, unspecified depression type  Continue Lexapro. Valium at a low dose helps both anxiety and muscle related issues. No evidence of diversion. Continue as needed medication    4.  Personal history of DVT (deep vein

## 2023-12-06 DIAGNOSIS — G82.20 PARAPLEGIA (HCC): ICD-10-CM

## 2023-12-06 RX ORDER — DIAZEPAM 2 MG/1
TABLET ORAL
Qty: 30 TABLET | Refills: 0 | Status: SHIPPED | OUTPATIENT
Start: 2023-12-06 | End: 2024-01-05

## 2024-01-05 DIAGNOSIS — F32.A DEPRESSION, UNSPECIFIED DEPRESSION TYPE: ICD-10-CM

## 2024-01-05 DIAGNOSIS — S24.101A SPINAL CORD INJURY, T1-T6 (HCC): ICD-10-CM

## 2024-01-05 DIAGNOSIS — G82.20 PARAPLEGIA (HCC): ICD-10-CM

## 2024-01-05 RX ORDER — ESCITALOPRAM OXALATE 10 MG/1
TABLET ORAL
Qty: 90 TABLET | Refills: 3 | Status: SHIPPED | OUTPATIENT
Start: 2024-01-05

## 2024-01-05 RX ORDER — DIAZEPAM 2 MG/1
TABLET ORAL
Qty: 30 TABLET | Refills: 0 | Status: SHIPPED | OUTPATIENT
Start: 2024-01-05 | End: 2024-02-04

## 2024-02-05 DIAGNOSIS — G82.20 PARAPLEGIA (HCC): ICD-10-CM

## 2024-02-05 RX ORDER — DIAZEPAM 2 MG/1
TABLET ORAL
Qty: 30 TABLET | Refills: 0 | Status: SHIPPED | OUTPATIENT
Start: 2024-02-05 | End: 2024-03-06

## 2024-02-14 DIAGNOSIS — Z86.718 PERSONAL HISTORY OF DVT (DEEP VEIN THROMBOSIS): ICD-10-CM

## 2024-02-16 DIAGNOSIS — K59.2 NEUROGENIC BOWEL: ICD-10-CM

## 2024-02-19 RX ORDER — POLYETHYLENE GLYCOL 3350 17 G/17G
POWDER, FOR SOLUTION ORAL
Qty: 236 G | Refills: 0 | Status: SHIPPED | OUTPATIENT
Start: 2024-02-19

## 2024-03-05 DIAGNOSIS — K59.2 NEUROGENIC BOWEL: ICD-10-CM

## 2024-03-05 RX ORDER — POLYETHYLENE GLYCOL 3350 17 G/17G
POWDER, FOR SOLUTION ORAL
Qty: 238 G | Refills: 0 | Status: SHIPPED | OUTPATIENT
Start: 2024-03-05

## 2024-03-06 DIAGNOSIS — G82.20 PARAPLEGIA (HCC): ICD-10-CM

## 2024-03-06 RX ORDER — DIAZEPAM 2 MG/1
TABLET ORAL
Qty: 30 TABLET | Refills: 1 | Status: SHIPPED | OUTPATIENT
Start: 2024-03-06 | End: 2024-04-05

## 2024-03-06 NOTE — TELEPHONE ENCOUNTER
Date of last refill of this med was 2/5/24, # of pills given 30 and # of refills given 0.  Their next appointment is not scheduled, the last telephone visit was 11/27/23.  Does patient have medication agreement on file? No-mailed to patient

## 2024-03-18 RX ORDER — BACLOFEN 10 MG/1
TABLET ORAL
Qty: 180 TABLET | Refills: 5 | Status: SHIPPED | OUTPATIENT
Start: 2024-03-18

## 2024-03-19 DIAGNOSIS — K59.2 NEUROGENIC BOWEL: ICD-10-CM

## 2024-03-19 RX ORDER — POLYETHYLENE GLYCOL 3350 17 G/17G
POWDER, FOR SOLUTION ORAL
Qty: 238 G | Refills: 0 | Status: SHIPPED | OUTPATIENT
Start: 2024-03-19

## 2024-03-26 ENCOUNTER — TELEPHONE (OUTPATIENT)
Dept: FAMILY MEDICINE CLINIC | Age: 54
End: 2024-03-26

## 2024-03-26 DIAGNOSIS — R19.5 POSITIVE FIT (FECAL IMMUNOCHEMICAL TEST): Primary | ICD-10-CM

## 2024-03-26 NOTE — TELEPHONE ENCOUNTER
Patient is asking for an order for 14FR Male Catheters to be sent to [facility].  Patient is needing more than he usually gets.  Patient says that he caths himself at least 11 times a day.

## 2024-04-04 DIAGNOSIS — G82.20 PARAPLEGIA (HCC): ICD-10-CM

## 2024-04-04 RX ORDER — DIAZEPAM 2 MG/1
TABLET ORAL
Qty: 30 TABLET | Refills: 1 | Status: SHIPPED | OUTPATIENT
Start: 2024-04-04 | End: 2024-05-04

## 2024-04-04 NOTE — TELEPHONE ENCOUNTER
Date of last refill of this med was 3/6/2024, # of pills given 30 and # of refills given 1.  Their next appointment is 0, the last date patient was seen was 11/27/2023.  Does patient have medication agreement on file? No  Has drug screen been done in last 12 months if needed? no

## 2024-04-22 DIAGNOSIS — S24.101A SPINAL CORD INJURY, T1-T6 (HCC): ICD-10-CM

## 2024-04-22 RX ORDER — AMOXICILLIN 250 MG
CAPSULE ORAL
Qty: 30 TABLET | Refills: 5 | Status: SHIPPED | OUTPATIENT
Start: 2024-04-22

## 2024-05-17 DIAGNOSIS — S24.101A SPINAL CORD INJURY, T1-T6 (HCC): ICD-10-CM

## 2024-05-17 RX ORDER — BISACODYL 10 MG
SUPPOSITORY, RECTAL RECTAL
Qty: 90 SUPPOSITORY | Refills: 3 | Status: SHIPPED | OUTPATIENT
Start: 2024-05-17

## 2024-05-17 NOTE — TELEPHONE ENCOUNTER
Last office visit 11/27/2023, advised to follow-up 6 months, next appointment 0. Will mail appt letter

## 2024-06-12 DIAGNOSIS — K59.2 NEUROGENIC BOWEL: ICD-10-CM

## 2024-06-12 RX ORDER — POLYETHYLENE GLYCOL 3350 17 G/17G
POWDER, FOR SOLUTION ORAL
Qty: 238 G | Refills: 3 | Status: SHIPPED | OUTPATIENT
Start: 2024-06-12

## 2024-07-03 DIAGNOSIS — G82.20 PARAPLEGIA (HCC): ICD-10-CM

## 2024-07-03 RX ORDER — DIAZEPAM 2 MG/1
TABLET ORAL
Qty: 30 TABLET | Refills: 1 | Status: SHIPPED | OUTPATIENT
Start: 2024-07-03 | End: 2024-08-02

## 2024-07-03 NOTE — TELEPHONE ENCOUNTER
Date of last refill of this med was 4/4/2024, # of pills given 30 and # of refills given 1.  Their next appointment is 0, the last date patient was seen was 11/27/2023.  Does patient have medication agreement on file? No  Has drug screen been done in last 12 months if needed? no  Mailed appt letter and med contract previously

## 2024-07-31 DIAGNOSIS — G82.20 PARAPLEGIA (HCC): ICD-10-CM

## 2024-07-31 RX ORDER — DIAZEPAM 2 MG/1
TABLET ORAL
Qty: 30 TABLET | Refills: 1 | Status: SHIPPED | OUTPATIENT
Start: 2024-07-31 | End: 2024-08-30

## 2024-07-31 NOTE — TELEPHONE ENCOUNTER
Date of last refill of this med was 7/3/2024, # of pills given 30 and # of refills given 1.  Their next appointment is 0, the last date patient was seen was 11/27/2023.  Does patient have medication agreement on file? No  Has drug screen been done in last 12 months if needed? no

## 2024-08-07 DIAGNOSIS — K59.2 NEUROGENIC BOWEL: ICD-10-CM

## 2024-08-07 RX ORDER — POLYETHYLENE GLYCOL 3350 17 G/17G
POWDER, FOR SOLUTION ORAL
Qty: 238 G | Refills: 3 | Status: SHIPPED | OUTPATIENT
Start: 2024-08-07

## 2024-10-11 ENCOUNTER — TELEPHONE (OUTPATIENT)
Dept: FAMILY MEDICINE CLINIC | Age: 54
End: 2024-10-11

## 2024-10-11 NOTE — TELEPHONE ENCOUNTER
I have not physically seen him in some time.  Would recommend appointment.  He has seen wound care in the past.  Likely would require visit with wound care given his history, and wheelchair-bound.  Okay for wound care referral.  Otherwise arrange appointment

## 2024-10-11 NOTE — TELEPHONE ENCOUNTER
Patient having issues with wound on buttocks, unsure what it is from he repositions his chair frequently, has a new mattress. Stated you could call him if you want more details. He does not have home care at this time but is willing to get them

## 2024-10-29 DIAGNOSIS — S24.101A SPINAL CORD INJURY, T1-T6 (HCC): ICD-10-CM

## 2024-10-29 RX ORDER — OXYBUTYNIN CHLORIDE 5 MG/1
TABLET ORAL
Qty: 270 TABLET | Refills: 3 | Status: SHIPPED | OUTPATIENT
Start: 2024-10-29

## 2024-11-01 DIAGNOSIS — S24.101A SPINAL CORD INJURY, T1-T6 (HCC): ICD-10-CM

## 2024-11-01 RX ORDER — AMOXICILLIN 250 MG
CAPSULE ORAL
Qty: 30 TABLET | Refills: 5 | Status: SHIPPED | OUTPATIENT
Start: 2024-11-01

## 2024-11-04 DIAGNOSIS — G82.20 PARAPLEGIA (HCC): ICD-10-CM

## 2024-11-04 RX ORDER — DIAZEPAM 2 MG/1
TABLET ORAL
Qty: 30 TABLET | Refills: 1 | Status: SHIPPED | OUTPATIENT
Start: 2024-11-04 | End: 2024-12-04

## 2024-11-04 NOTE — TELEPHONE ENCOUNTER
Date of last refill of this med was 10/05/2024, # of pills given 30 and # of refills given 1.  Their next appointment is 11/15/2024, the last date patient was seen was 11/27/2024.  Does patient have medication agreement on file? No  Has drug screen been done in last 12 months if needed? no

## 2024-11-26 ENCOUNTER — TELEMEDICINE (OUTPATIENT)
Dept: FAMILY MEDICINE CLINIC | Age: 54
End: 2024-11-26
Payer: COMMERCIAL

## 2024-11-26 DIAGNOSIS — Z87.2 HISTORY OF PRESSURE ULCER: ICD-10-CM

## 2024-11-26 DIAGNOSIS — K59.2 NEUROGENIC BOWEL: ICD-10-CM

## 2024-11-26 DIAGNOSIS — Z79.01 LONG TERM (CURRENT) USE OF ANTICOAGULANTS: ICD-10-CM

## 2024-11-26 DIAGNOSIS — S24.102D T5 SPINAL CORD INJURY, SUBSEQUENT ENCOUNTER (HCC): ICD-10-CM

## 2024-11-26 DIAGNOSIS — N31.9 NEUROGENIC BLADDER: ICD-10-CM

## 2024-11-26 DIAGNOSIS — Z13.220 SCREENING, LIPID: ICD-10-CM

## 2024-11-26 DIAGNOSIS — F32.A DEPRESSION, UNSPECIFIED DEPRESSION TYPE: ICD-10-CM

## 2024-11-26 DIAGNOSIS — G82.20 PARAPLEGIA (HCC): Primary | ICD-10-CM

## 2024-11-26 PROCEDURE — 3017F COLORECTAL CA SCREEN DOC REV: CPT | Performed by: FAMILY MEDICINE

## 2024-11-26 PROCEDURE — G8427 DOCREV CUR MEDS BY ELIG CLIN: HCPCS | Performed by: FAMILY MEDICINE

## 2024-11-26 PROCEDURE — 99214 OFFICE O/P EST MOD 30 MIN: CPT | Performed by: FAMILY MEDICINE

## 2024-11-26 SDOH — ECONOMIC STABILITY: FOOD INSECURITY: WITHIN THE PAST 12 MONTHS, THE FOOD YOU BOUGHT JUST DIDN'T LAST AND YOU DIDN'T HAVE MONEY TO GET MORE.: NEVER TRUE

## 2024-11-26 SDOH — ECONOMIC STABILITY: INCOME INSECURITY: HOW HARD IS IT FOR YOU TO PAY FOR THE VERY BASICS LIKE FOOD, HOUSING, MEDICAL CARE, AND HEATING?: NOT HARD AT ALL

## 2024-11-26 SDOH — ECONOMIC STABILITY: FOOD INSECURITY: WITHIN THE PAST 12 MONTHS, YOU WORRIED THAT YOUR FOOD WOULD RUN OUT BEFORE YOU GOT MONEY TO BUY MORE.: NEVER TRUE

## 2024-11-26 ASSESSMENT — PATIENT HEALTH QUESTIONNAIRE - PHQ9
6. FEELING BAD ABOUT YOURSELF - OR THAT YOU ARE A FAILURE OR HAVE LET YOURSELF OR YOUR FAMILY DOWN: NOT AT ALL
3. TROUBLE FALLING OR STAYING ASLEEP: NOT AT ALL
4. FEELING TIRED OR HAVING LITTLE ENERGY: NOT AT ALL
8. MOVING OR SPEAKING SO SLOWLY THAT OTHER PEOPLE COULD HAVE NOTICED. OR THE OPPOSITE, BEING SO FIGETY OR RESTLESS THAT YOU HAVE BEEN MOVING AROUND A LOT MORE THAN USUAL: NOT AT ALL
1. LITTLE INTEREST OR PLEASURE IN DOING THINGS: NOT AT ALL
SUM OF ALL RESPONSES TO PHQ QUESTIONS 1-9: 0
SUM OF ALL RESPONSES TO PHQ9 QUESTIONS 1 & 2: 0
SUM OF ALL RESPONSES TO PHQ QUESTIONS 1-9: 0
2. FEELING DOWN, DEPRESSED OR HOPELESS: NOT AT ALL
7. TROUBLE CONCENTRATING ON THINGS, SUCH AS READING THE NEWSPAPER OR WATCHING TELEVISION: NOT AT ALL
9. THOUGHTS THAT YOU WOULD BE BETTER OFF DEAD, OR OF HURTING YOURSELF: NOT AT ALL
SUM OF ALL RESPONSES TO PHQ QUESTIONS 1-9: 0
10. IF YOU CHECKED OFF ANY PROBLEMS, HOW DIFFICULT HAVE THESE PROBLEMS MADE IT FOR YOU TO DO YOUR WORK, TAKE CARE OF THINGS AT HOME, OR GET ALONG WITH OTHER PEOPLE: NOT DIFFICULT AT ALL
5. POOR APPETITE OR OVEREATING: NOT AT ALL
SUM OF ALL RESPONSES TO PHQ QUESTIONS 1-9: 0

## 2024-11-26 ASSESSMENT — ENCOUNTER SYMPTOMS: BLOOD IN STOOL: 0

## 2024-11-26 NOTE — PROGRESS NOTES
please re-schedule the visit: Yes, I confirm.        Total time spent for this encounter: 18 minutes    --Stas German MD on 11/26/2024 at 2:58 PM    An electronic signature was used to authenticate this note.          Pursuant to the emergency declaration under the [employer] Act and the National Emergencies Act, 1135 waiver authority and the Coronavirus Preparedness and Response Supplemental Appropriations Act, this Virtual  Visit was conducted, with patient's consent, to reduce the patient's risk of exposure to COVID-19 and provide continuity of care for an established patient.    Services were provided through a video synchronous discussion virtually to substitute for in-person clinic visit.

## 2024-12-12 DIAGNOSIS — S24.101A SPINAL CORD INJURY, T1-T6 (HCC): ICD-10-CM

## 2024-12-12 RX ORDER — BACLOFEN 20 MG/1
TABLET ORAL
Qty: 90 TABLET | Refills: 3 | OUTPATIENT
Start: 2024-12-12

## 2025-01-03 DIAGNOSIS — G82.20 PARAPLEGIA (HCC): ICD-10-CM

## 2025-01-07 RX ORDER — DIAZEPAM 2 MG/1
TABLET ORAL
Qty: 30 TABLET | Refills: 1 | Status: SHIPPED | OUTPATIENT
Start: 2025-01-07 | End: 2025-02-06

## 2025-01-07 NOTE — TELEPHONE ENCOUNTER
[phone] (Home Phone)  Called and scheduled pts next follow up appointment       Future appt scheduled 03/28/2024                    Last appt 11/26/2024      Last Written 11/04/2024    diazePAM (VALIUM) 2 MG tablet   #30  1 RF

## 2025-01-08 DIAGNOSIS — K59.2 NEUROGENIC BOWEL: ICD-10-CM

## 2025-01-08 RX ORDER — POLYETHYLENE GLYCOL 3350 17 G/17G
POWDER, FOR SOLUTION ORAL
Qty: 238 G | Refills: 3 | Status: SHIPPED | OUTPATIENT
Start: 2025-01-08

## 2025-01-13 ENCOUNTER — TELEPHONE (OUTPATIENT)
Dept: FAMILY MEDICINE CLINIC | Age: 55
End: 2025-01-13

## 2025-01-13 DIAGNOSIS — S24.101A SPINAL CORD INJURY, T1-T6 (HCC): ICD-10-CM

## 2025-01-13 DIAGNOSIS — F32.A DEPRESSION, UNSPECIFIED DEPRESSION TYPE: ICD-10-CM

## 2025-01-13 RX ORDER — ESCITALOPRAM OXALATE 10 MG/1
10 TABLET ORAL DAILY
Qty: 90 TABLET | Refills: 3 | Status: SHIPPED | OUTPATIENT
Start: 2025-01-13

## 2025-01-13 NOTE — TELEPHONE ENCOUNTER
Sister picked up pts scripts on 12/27 and the lexapro was suppose to be in there but pt can't find them now, pharmacy states that a 90 DS was filled. Is there anything you can do?

## 2025-01-13 NOTE — TELEPHONE ENCOUNTER
Noted will pend request for provider     Called and informed pt who asked to send to the [place] pharmacy

## 2025-01-13 NOTE — TELEPHONE ENCOUNTER
It appears the last prescription for Lexapro was provided approximately 1 year ago, with 90 days and 3 refills at that time.  Okay to send as a refill request

## 2025-01-13 NOTE — TELEPHONE ENCOUNTER
Refill Request     CONFIRM preferrred pharmacy with the patient.    If Mail Order Rx - Pend for 90 day refill.      Last Seen: Last Seen Department: 11/26/2024  Last Seen by PCP: 11/26/2024    Last Written: 01/05/2024    If no future appointment scheduled, route STAFF MESSAGE with patient name to the  Pool for scheduling.      Next Appointment:   Future Appointments   Date Time Provider Department Center   3/28/2025 11:20 AM Stas German MD [facility] DEP       Message sent to  to schedule appt with patient?  NO      Requested Prescriptions     Pending Prescriptions Disp Refills    escitalopram (LEXAPRO) 10 MG tablet 90 tablet 3     Sig: Take 1 tablet by mouth daily

## 2025-01-21 ENCOUNTER — TELEPHONE (OUTPATIENT)
Dept: FAMILY MEDICINE CLINIC | Age: 55
End: 2025-01-21

## 2025-01-21 DIAGNOSIS — S24.101A SPINAL CORD INJURY, T1-T6 (HCC): Primary | ICD-10-CM

## 2025-01-21 NOTE — TELEPHONE ENCOUNTER
[facility] called.   Needing orders for Electrical hospital bed with half rails for diagnosis of T5 Spinal Cord Injury S23.846X.   Fax to Claribel [facility]WT[facility]

## 2025-01-24 DIAGNOSIS — Z86.718 PERSONAL HISTORY OF DVT (DEEP VEIN THROMBOSIS): ICD-10-CM

## 2025-03-05 DIAGNOSIS — K59.2 NEUROGENIC BOWEL: ICD-10-CM

## 2025-03-05 RX ORDER — POLYETHYLENE GLYCOL 3350 17 G/17G
POWDER, FOR SOLUTION ORAL
Qty: 238 G | Refills: 3 | Status: SHIPPED | OUTPATIENT
Start: 2025-03-05

## 2025-03-28 ENCOUNTER — TELEMEDICINE (OUTPATIENT)
Dept: FAMILY MEDICINE CLINIC | Age: 55
End: 2025-03-28

## 2025-03-28 DIAGNOSIS — N31.9 NEUROGENIC BLADDER: Primary | ICD-10-CM

## 2025-03-28 DIAGNOSIS — K59.2 NEUROGENIC BOWEL: ICD-10-CM

## 2025-03-28 DIAGNOSIS — F32.A DEPRESSION, UNSPECIFIED DEPRESSION TYPE: ICD-10-CM

## 2025-03-28 DIAGNOSIS — S24.102S: ICD-10-CM

## 2025-03-28 SDOH — ECONOMIC STABILITY: FOOD INSECURITY: WITHIN THE PAST 12 MONTHS, YOU WORRIED THAT YOUR FOOD WOULD RUN OUT BEFORE YOU GOT MONEY TO BUY MORE.: NEVER TRUE

## 2025-03-28 SDOH — ECONOMIC STABILITY: FOOD INSECURITY: WITHIN THE PAST 12 MONTHS, THE FOOD YOU BOUGHT JUST DIDN'T LAST AND YOU DIDN'T HAVE MONEY TO GET MORE.: NEVER TRUE

## 2025-03-28 ASSESSMENT — PATIENT HEALTH QUESTIONNAIRE - PHQ9
SUM OF ALL RESPONSES TO PHQ QUESTIONS 1-9: 0
1. LITTLE INTEREST OR PLEASURE IN DOING THINGS: NOT AT ALL
7. TROUBLE CONCENTRATING ON THINGS, SUCH AS READING THE NEWSPAPER OR WATCHING TELEVISION: NOT AT ALL
SUM OF ALL RESPONSES TO PHQ QUESTIONS 1-9: 0
10. IF YOU CHECKED OFF ANY PROBLEMS, HOW DIFFICULT HAVE THESE PROBLEMS MADE IT FOR YOU TO DO YOUR WORK, TAKE CARE OF THINGS AT HOME, OR GET ALONG WITH OTHER PEOPLE: NOT DIFFICULT AT ALL
3. TROUBLE FALLING OR STAYING ASLEEP: NOT AT ALL
4. FEELING TIRED OR HAVING LITTLE ENERGY: NOT AT ALL
5. POOR APPETITE OR OVEREATING: NOT AT ALL
SUM OF ALL RESPONSES TO PHQ QUESTIONS 1-9: 0
SUM OF ALL RESPONSES TO PHQ QUESTIONS 1-9: 0
2. FEELING DOWN, DEPRESSED OR HOPELESS: NOT AT ALL
8. MOVING OR SPEAKING SO SLOWLY THAT OTHER PEOPLE COULD HAVE NOTICED. OR THE OPPOSITE, BEING SO FIGETY OR RESTLESS THAT YOU HAVE BEEN MOVING AROUND A LOT MORE THAN USUAL: NOT AT ALL
9. THOUGHTS THAT YOU WOULD BE BETTER OFF DEAD, OR OF HURTING YOURSELF: NOT AT ALL
6. FEELING BAD ABOUT YOURSELF - OR THAT YOU ARE A FAILURE OR HAVE LET YOURSELF OR YOUR FAMILY DOWN: NOT AT ALL

## 2025-03-28 ASSESSMENT — ENCOUNTER SYMPTOMS: CONSTIPATION: 0

## 2025-03-28 NOTE — PROGRESS NOTES
certain activities and transfers.    This patient has a mobility limitation that impairs their ability to do one or more of the following in the home: toileting, eating, dressing, grooming, or bathing.    Mobility limitation cannot be improved with a cane or walker.     Use of a manual wheelchair will significantly improve ability to participate in daily activities within their home and patient will use on a regular basis.    This patient has not expressed an unwillingness to use the wheelchair in their home.    This patient has upper extremity function and other physical and mental capabilities needed to safely propel the wheelchair in the home OR the patient has a caregiver available and willing to provide assistance with the wheelchair.     Patient denies any new significant issues with wound buttocks.  We have recommended wound care in the past.  He is working on different methods of transfer to try to minimize irritation    4. Depression, unspecified depression type  The current medical regimen is effective;  continue present plan and medications.        No follow-ups on file.    Wicho Velasquez, was evaluated through a synchronous (real-time) audio-video encounter. The patient (or guardian if applicable) is aware that this is a billable service, which includes applicable co-pays. This Virtual Visit was conducted with patient's (and/or legal guardian's) consent. Patient identification was verified, and a caregiver was present when appropriate.   The patient was located [facility] [id number]  Provider was located at Facility (Appt Dept): [address] [place] [id number]  Confirm you are appropriately licensed, registered, or certified to deliver care in the state where the patient is located as indicated above. If you are not or unsure, please re-schedule the visit: Yes, I confirm.         --Stas German MD on 3/28/2025 at 11:54 AM    An electronic signature was used

## 2025-04-03 DIAGNOSIS — S24.101A SPINAL CORD INJURY, T1-T6 (HCC): ICD-10-CM

## 2025-04-03 RX ORDER — BACLOFEN 20 MG/1
TABLET ORAL
Qty: 90 TABLET | Refills: 3 | Status: SHIPPED | OUTPATIENT
Start: 2025-04-03

## 2025-04-10 DIAGNOSIS — S24.101A SPINAL CORD INJURY, T1-T6 (HCC): ICD-10-CM

## 2025-04-10 RX ORDER — BISACODYL 10 MG
SUPPOSITORY, RECTAL RECTAL
Qty: 90 SUPPOSITORY | Refills: 3 | Status: SHIPPED | OUTPATIENT
Start: 2025-04-10

## 2025-04-18 DIAGNOSIS — S24.101A SPINAL CORD INJURY, T1-T6 (HCC): ICD-10-CM

## 2025-04-21 RX ORDER — BACLOFEN 10 MG/1
TABLET ORAL
Qty: 180 TABLET | Refills: 5 | Status: SHIPPED | OUTPATIENT
Start: 2025-04-21

## 2025-04-21 RX ORDER — AMOXICILLIN 250 MG
CAPSULE ORAL
Qty: 30 TABLET | Refills: 5 | Status: SHIPPED | OUTPATIENT
Start: 2025-04-21

## 2025-04-21 NOTE — TELEPHONE ENCOUNTER
Refill Request     CONFIRM preferrred pharmacy with the patient.    If Mail Order Rx - Pend for 90 day refill.      Last Seen: Last Seen Department: 3/28/2025  Last Seen by PCP: 3/28/2025    Last Written: 2/12/2025    If no future appointment scheduled, route STAFF MESSAGE with patient name to the  Pool for scheduling.      Next Appointment:   No future appointments.    Message sent to  to schedule appt with patient?  NO      Requested Prescriptions     Pending Prescriptions Disp Refills    baclofen (LIORESAL) 10 MG tablet [Pharmacy Med Name: BACLOFEN 10MG TABLET] 180 tablet 5     Sig: TAKE ONE TABLET BY MOUTH TWO (2) TIMES A DAY

## 2025-05-01 DIAGNOSIS — S24.102S: Primary | ICD-10-CM

## 2025-05-01 RX ORDER — DIAZEPAM 2 MG/1
TABLET ORAL
Qty: 30 TABLET | Refills: 1 | Status: SHIPPED | OUTPATIENT
Start: 2025-05-01 | End: 2025-05-31

## 2025-05-01 NOTE — TELEPHONE ENCOUNTER
Future appt scheduled 0 appt scheduled Return in about 4 months (around 7/28/2025).                     Last appt 03/28/2025        Refill Request     CONFIRM preferrred pharmacy with the patient.    If Mail Order Rx - Pend for 90 day refill.      Last Seen: Last Seen Department: 3/28/2025  Last Seen by PCP: 3/28/2025    Last Written: 01/07/2025    If no future appointment scheduled, route STAFF MESSAGE with patient name to the  Pool for scheduling.      Next Appointment:   No future appointments.    Message sent to  to schedule appt with patient?  NO      Requested Prescriptions     Pending Prescriptions Disp Refills    diazePAM (VALIUM) 2 MG tablet [Pharmacy Med Name: DIAZEPAM 2MG TABLET] 30 tablet 1     Sig: TAKE ONE (1) TABLET BY MOUTH NIGHTLY AS NEEDED FOR ANXIETY

## 2025-05-23 DIAGNOSIS — K59.2 NEUROGENIC BOWEL: ICD-10-CM

## 2025-05-27 RX ORDER — POLYETHYLENE GLYCOL 3350 17 G/17G
POWDER, FOR SOLUTION ORAL
Qty: 238 G | Refills: 3 | Status: SHIPPED | OUTPATIENT
Start: 2025-05-27

## 2025-06-04 DIAGNOSIS — S24.102S: ICD-10-CM

## 2025-06-04 RX ORDER — DIAZEPAM 2 MG/1
TABLET ORAL
Qty: 30 TABLET | Refills: 1 | Status: SHIPPED | OUTPATIENT
Start: 2025-06-04 | End: 2025-07-04

## 2025-07-19 DIAGNOSIS — K59.2 NEUROGENIC BOWEL: ICD-10-CM

## 2025-07-21 RX ORDER — POLYETHYLENE GLYCOL 3350 17 G/17G
POWDER ORAL
Qty: 238 G | Refills: 3 | Status: SHIPPED | OUTPATIENT
Start: 2025-07-21

## 2025-09-02 DIAGNOSIS — S24.102S: ICD-10-CM

## 2025-09-02 RX ORDER — DIAZEPAM 2 MG/1
TABLET ORAL
Qty: 7 TABLET | Refills: 0 | Status: SHIPPED | OUTPATIENT
Start: 2025-09-02 | End: 2025-09-09